# Patient Record
Sex: FEMALE | Race: WHITE | Employment: OTHER | URBAN - METROPOLITAN AREA
[De-identification: names, ages, dates, MRNs, and addresses within clinical notes are randomized per-mention and may not be internally consistent; named-entity substitution may affect disease eponyms.]

---

## 2020-06-23 PROBLEM — M17.11 OSTEOARTHRITIS OF RIGHT PATELLOFEMORAL JOINT: Status: ACTIVE | Noted: 2020-06-23

## 2020-06-23 ASSESSMENT — PROMIS GLOBAL HEALTH SCALE
IN GENERAL, PLEASE RATE HOW WELL YOU CARRY OUT YOUR USUAL SOCIAL ACTIVITIES (INCLUDES ACTIVITIES AT HOME, AT WORK, AND IN YOUR COMMUNITY, AND RESPONSIBILITIES AS A PARENT, CHILD, SPOUSE, EMPLOYEE, FRIEND, ETC) [ON A SCALE OF 1 (POOR) TO 5 (EXCELLENT)]?: 4
IN THE PAST 7 DAYS, HOW WOULD YOU RATE YOUR PAIN ON AVERAGE [ON A SCALE FROM 0 (NO PAIN) TO 10 (WORST IMAGINABLE PAIN)]?: 5
IN GENERAL, WOULD YOU SAY YOUR HEALTH IS...[ON A SCALE OF 1 (POOR) TO 5 (EXCELLENT)]: 4
IN GENERAL, HOW WOULD YOU RATE YOUR MENTAL HEALTH, INCLUDING YOUR MOOD AND YOUR ABILITY TO THINK [ON A SCALE OF 1 (POOR) TO 5 (EXCELLENT)]?: 4
IN THE PAST 7 DAYS, HOW WOULD YOU RATE YOUR FATIGUE ON AVERAGE [ON A SCALE FROM 1 (NONE) TO 5 (VERY SEVERE)]?: 3
IN GENERAL, WOULD YOU SAY YOUR QUALITY OF LIFE IS...[ON A SCALE OF 1 (POOR) TO 5 (EXCELLENT)]: 4
HOW IS THE PROMIS V1.1 BEING ADMINISTERED?: 2
IN GENERAL, HOW WOULD YOU RATE YOUR SATISFACTION WITH YOUR SOCIAL ACTIVITIES AND RELATIONSHIPS [ON A SCALE OF 1 (POOR) TO 5 (EXCELLENT)]?: 4
TO WHAT EXTENT ARE YOU ABLE TO CARRY OUT YOUR EVERYDAY PHYSICAL ACTIVITIES SUCH AS WALKING, CLIMBING STAIRS, CARRYING GROCERIES, OR MOVING A CHAIR [ON A SCALE OF 1 (NOT AT ALL) TO 5 (COMPLETELY)]?: 4
IN THE PAST 7 DAYS, HOW OFTEN HAVE YOU BEEN BOTHERED BY EMOTIONAL PROBLEMS, SUCH AS FEELING ANXIOUS, DEPRESSED, OR IRRITABLE [ON A SCALE FROM 1 (NEVER) TO 5 (ALWAYS)]?: 2
WHO IS THE PERSON COMPLETING THE PROMIS V1.1 SURVEY?: 0
IN GENERAL, HOW WOULD YOU RATE YOUR PHYSICAL HEALTH [ON A SCALE OF 1 (POOR) TO 5 (EXCELLENT)]?: 4

## 2020-06-23 ASSESSMENT — KOOS JR
TWISING OR PIVOTING ON KNEE: 2
GOING UP OR DOWN STAIRS: 3
STANDING UPRIGHT: 3
STRAIGHTENING KNEE FULLY: 2
BENDING TO THE FLOOR TO PICK UP OBJECT: 3
HOW SEVERE IS YOUR KNEE STIFFNESS AFTER FIRST WAKING IN MORNING: 2
RISING FROM SITTING: 3

## 2020-06-25 NOTE — H&P
[id number] [facility]                  [address]                       PREOPERATIVE HISTORY AND PHYSICAL    PATIENT NAME: Mike Nicolas                       :        1940  MED REC NO:   [id number]                            ROOM:  ACCOUNT NO:   [de-identified]                           ADMIT DATE: 2020  PROVIDER:     ELLIOT Willoughby    DATE OF SURGERY:  2020    CHIEF COMPLAINT:  Right knee pain. HISTORY OF PRESENT ILLNESS:  This is a 61-year-old female with chronic  right knee pain secondary to failed right total knee arthroplasty. She  is status post right total knee from 2013 from Dr. Melvin Whitaker in [place]. She has an unresurfaced patella and significant  osteoarthritis within the patellofemoral joint. She also has painful  and stiff total knee replacement with a question of oversized femoral  component. Workup for infection has been negative. She is now  scheduled for full revision right total knee arthroplasty. PAST MEDICAL HISTORY:  Hypertension. PRIOR SURGERIES:  Include bilateral knee arthroplasties. CURRENT MEDICATIONS:  Naproxen, latanoprost, omeprazole, fluticasone,  lovastatin, and paroxetine. ALLERGIES:  None. FAMILY HISTORY:  Heart disease. SOCIAL HISTORY:  Admits to social alcohol consumption. Denies tobacco  use. PCP is Dr. Patria Londono. REVIEW OF SYSTEMS:  ALLERGIES:  As stated above. SKIN AND LYMPHATIC:  Denies rash or lesions. CNS:  No prior CVAs. RESPIRATORY:  No cough or shortness of breath. CIRCULATORY:  History of hypertension. DIGESTIVE:  No dyspepsia. GENITOURINARY:  No dysuria. METABOLIC AND ENDOCRINE:  No thyroid disease or diabetes. HEMATOLOGIC:  No anemia. MUSCULOSKELETAL:  Positive OA. PSYCHIATRIC:  Negative. PHYSICAL EXAMINATION:  GENERAL APPEARANCE:  A well-nourished, well-developed 61-year-old  female, no acute distress. Alert and oriented x3.   SKIN: Without masses, rashes, or lesions. LYMPH NODES:  No adenopathy. HEAD:  Normocephalic, atraumatic. EARS:  Clear and functional.  EYES AND FUNDI:  PERRLA. NOSE:  Clear without deviation. MOUTH, TEETH, AND THROAT:  Clear and functional.  NECK:  Supple. No JVD. CHEST:  Normal excursion. BREASTS:  Deferred. LUNGS:  Clear to auscultation and percussion. HEART:  Regular rate and rhythm. No murmurs, gallops, or rubs  appreciated. ABDOMEN:  Rounded, soft, nontender. Hernia negative. BACK:  Nontender. EXTREMITIES:  Without clubbing, cyanosis, or edema. Exam of right knee:  Well-healed surgical incision. 5 to 90 degrees range of motion with  positive patellofemoral crepitus and tenderness. No laxity. No  effusion. 2/2 pulses. Neurovascular status distally is intact. NEUROLOGIC:  Cranial nerves II through XII grossly intact. GENITAL:  Deferred. RECTAL:  Deferred. DIAGNOSTIC IMPRESSION:  Failed right total knee arthroplasty with  osteoarthritis of the patellofemoral joint. PLAN:  Full revision right total knee arthroplasty.         ELLIOT Tavarez    D: 06/25/2020 8:08:17       T: 06/25/2020 8:54:45     [id number]  Job#: [id number]     Doc#: [id number]    CC:

## 2020-07-02 ENCOUNTER — ANESTHESIA EVENT (OUTPATIENT)
Dept: OPERATING ROOM | Age: 80
DRG: 466 | End: 2020-07-02
Payer: MEDICARE

## 2020-07-02 ENCOUNTER — HOSPITAL ENCOUNTER (OUTPATIENT)
Age: 80
Discharge: HOME | End: 2020-07-04
Payer: MEDICARE

## 2020-07-02 ENCOUNTER — HOSPITAL ENCOUNTER (OUTPATIENT)
Dept: PREADMISSION TESTING | Age: 80
Discharge: HOME | End: 2020-07-02
Payer: MEDICARE

## 2020-07-02 VITALS
RESPIRATION RATE: 16 BRPM | BODY MASS INDEX: 32.78 KG/M2 | TEMPERATURE: 97.3 F | OXYGEN SATURATION: 95 % | DIASTOLIC BLOOD PRESSURE: 82 MMHG | SYSTOLIC BLOOD PRESSURE: 142 MMHG | HEART RATE: 82 BPM | HEIGHT: 64 IN | WEIGHT: 192 LBS

## 2020-07-02 LAB
ANION GAP SERPL CALCULATED.3IONS-SCNC: 5 MMOL/L (ref 7–16)
BUN BLDV-MCNC: 13 MG/DL (ref 8–23)
CALCIUM SERPL-MCNC: 9.5 MG/DL (ref 8.6–10.2)
CHLORIDE BLD-SCNC: 106 MMOL/L (ref 98–107)
CO2: 29 MMOL/L (ref 22–29)
CREAT SERPL-MCNC: 1 MG/DL (ref 0.5–1)
GFR AFRICAN AMERICAN: >60
GFR NON-AFRICAN AMERICAN: 54 ML/MIN/1.73
GLUCOSE BLD-MCNC: 98 MG/DL (ref 74–99)
HCT VFR BLD CALC: 41.5 % (ref 34–48)
HEMOGLOBIN: 12.8 G/DL (ref 11.5–15.5)
MCH RBC QN AUTO: 28.2 PG (ref 26–35)
MCHC RBC AUTO-ENTMCNC: 30.8 % (ref 32–34.5)
MCV RBC AUTO: 91.4 FL (ref 80–99.9)
PDW BLD-RTO: 14.1 FL (ref 11.5–15)
PLATELET # BLD: 258 E9/L (ref 130–450)
PMV BLD AUTO: 9.9 FL (ref 7–12)
POTASSIUM SERPL-SCNC: 4.2 MMOL/L (ref 3.5–5)
PREALBUMIN: 22 MG/DL (ref 20–40)
RBC # BLD: 4.54 E12/L (ref 3.5–5.5)
SODIUM BLD-SCNC: 140 MMOL/L (ref 132–146)
WBC # BLD: 5.1 E9/L (ref 4.5–11.5)

## 2020-07-02 PROCEDURE — 36415 COLL VENOUS BLD VENIPUNCTURE: CPT

## 2020-07-02 PROCEDURE — 84134 ASSAY OF PREALBUMIN: CPT

## 2020-07-02 PROCEDURE — 87081 CULTURE SCREEN ONLY: CPT

## 2020-07-02 PROCEDURE — U0003 INFECTIOUS AGENT DETECTION BY NUCLEIC ACID (DNA OR RNA); SEVERE ACUTE RESPIRATORY SYNDROME CORONAVIRUS 2 (SARS-COV-2) (CORONAVIRUS DISEASE [COVID-19]), AMPLIFIED PROBE TECHNIQUE, MAKING USE OF HIGH THROUGHPUT TECHNOLOGIES AS DESCRIBED BY CMS-2020-01-R: HCPCS

## 2020-07-02 PROCEDURE — 80048 BASIC METABOLIC PNL TOTAL CA: CPT

## 2020-07-02 PROCEDURE — 85027 COMPLETE CBC AUTOMATED: CPT

## 2020-07-02 RX ORDER — ROPIVACAINE HYDROCHLORIDE 5 MG/ML
30 INJECTION, SOLUTION EPIDURAL; INFILTRATION; PERINEURAL
Status: CANCELLED | OUTPATIENT
Start: 2020-07-02 | End: 2020-07-02

## 2020-07-02 RX ORDER — ACETAMINOPHEN 500 MG
1000 TABLET ORAL ONCE
Status: CANCELLED | OUTPATIENT
Start: 2020-07-02 | End: 2020-07-02

## 2020-07-02 RX ORDER — NAPROXEN 500 MG/1
500 TABLET ORAL EVERY 12 HOURS PRN
COMMUNITY
End: 2020-09-23

## 2020-07-02 RX ORDER — FLUTICASONE PROPIONATE 50 MCG
2 SPRAY, SUSPENSION (ML) NASAL DAILY
COMMUNITY
Start: 2019-02-23

## 2020-07-02 RX ORDER — OXYCODONE HYDROCHLORIDE 5 MG/1
5 TABLET ORAL ONCE
Status: CANCELLED | OUTPATIENT
Start: 2020-07-02 | End: 2020-07-02

## 2020-07-02 RX ORDER — CELECOXIB 100 MG/1
200 CAPSULE ORAL ONCE
Status: CANCELLED | OUTPATIENT
Start: 2020-07-02 | End: 2020-07-02

## 2020-07-02 RX ORDER — GABAPENTIN 300 MG/1
300 CAPSULE ORAL PRN
COMMUNITY
Start: 2019-05-21

## 2020-07-02 RX ORDER — LATANOPROST 50 UG/ML
1 SOLUTION/ DROPS OPHTHALMIC NIGHTLY
COMMUNITY
Start: 2017-05-15

## 2020-07-02 RX ORDER — FENTANYL CITRATE 50 UG/ML
100 INJECTION, SOLUTION INTRAMUSCULAR; INTRAVENOUS ONCE
Status: CANCELLED | OUTPATIENT
Start: 2020-07-02 | End: 2020-07-02

## 2020-07-02 RX ORDER — MIDAZOLAM HYDROCHLORIDE 1 MG/ML
1 INJECTION INTRAMUSCULAR; INTRAVENOUS EVERY 5 MIN PRN
Status: CANCELLED | OUTPATIENT
Start: 2020-07-02

## 2020-07-02 RX ORDER — GABAPENTIN 100 MG/1
200 CAPSULE ORAL ONCE
Status: CANCELLED | OUTPATIENT
Start: 2020-07-02 | End: 2020-07-02

## 2020-07-02 ASSESSMENT — KOOS JR
RISING FROM SITTING: 2
STANDING UPRIGHT: 2
STRAIGHTENING KNEE FULLY: 2
HOW SEVERE IS YOUR KNEE STIFFNESS AFTER FIRST WAKING IN MORNING: 2
GOING UP OR DOWN STAIRS: 2
BENDING TO THE FLOOR TO PICK UP OBJECT: 2
TWISING OR PIVOTING ON KNEE: 3

## 2020-07-02 ASSESSMENT — PAIN DESCRIPTION - ONSET: ONSET: SUDDEN

## 2020-07-02 ASSESSMENT — PAIN SCALES - GENERAL: PAINLEVEL_OUTOF10: 0

## 2020-07-02 ASSESSMENT — PAIN DESCRIPTION - PROGRESSION: CLINICAL_PROGRESSION: GRADUALLY WORSENING

## 2020-07-02 ASSESSMENT — PAIN DESCRIPTION - FREQUENCY: FREQUENCY: INTERMITTENT

## 2020-07-02 ASSESSMENT — PAIN DESCRIPTION - PAIN TYPE: TYPE: CHRONIC PAIN

## 2020-07-02 ASSESSMENT — PAIN DESCRIPTION - ORIENTATION: ORIENTATION: RIGHT

## 2020-07-02 ASSESSMENT — PAIN DESCRIPTION - LOCATION: LOCATION: KNEE

## 2020-07-02 ASSESSMENT — PAIN DESCRIPTION - DESCRIPTORS: DESCRIPTORS: ACHING

## 2020-07-02 ASSESSMENT — PAIN - FUNCTIONAL ASSESSMENT: PAIN_FUNCTIONAL_ASSESSMENT: PREVENTS OR INTERFERES SOME ACTIVE ACTIVITIES AND ADLS

## 2020-07-02 NOTE — ANESTHESIA PRE PROCEDURE
Department of Anesthesiology  Preprocedure Note       Name:  Rubio Haile   Age:  70 y.o.  :  1940                                          MRN:  [id number]         Date:  2020      Surgeon: Romelia Friday):  Quintin Song MD    Procedure: Procedure(s):  RIGHT KNEE TOTAL ARTHROPLASTY REVISION  ++LUCY++   ++PNB++    Medications prior to admission:   Prior to Admission medications    Medication Sig Start Date End Date Taking? Authorizing Provider   naproxen (NAPROSYN) 500 MG tablet Take 500 mg by mouth every 12 hours as needed Last taken 2020   Yes Historical Provider, MD   latanoprost (XALATAN) 0.005 % ophthalmic solution Place 1 drop into both eyes nightly  5/15/17  Yes Historical Provider, MD   fluticasone (FLONASE) 50 MCG/ACT nasal spray 2 sprays by Nasal route daily  19  Yes Historical Provider, MD   gabapentin (NEURONTIN) 300 MG capsule Take 300 mg by mouth as needed. 19  Yes Historical Provider, MD   Calcium Carb-Cholecalciferol (CALCIUM + D3 PO) Take 1 tablet by mouth 2 times daily Last  2020   Yes Historical Provider, MD   lovastatin (MEVACOR) 40 MG tablet Take 40 mg by mouth nightly  17  Yes Historical Provider, MD   PARoxetine (PAXIL) 20 MG tablet Take 20 mg by mouth every morning  9/15/17  Yes Historical Provider, MD   omeprazole (PRILOSEC) 20 MG capsule Take 40 mg by mouth daily. Yes Historical Provider, MD       Current medications:    Current Outpatient Medications   Medication Sig Dispense Refill    naproxen (NAPROSYN) 500 MG tablet Take 500 mg by mouth every 12 hours as needed Last taken 2020      latanoprost (XALATAN) 0.005 % ophthalmic solution Place 1 drop into both eyes nightly       fluticasone (FLONASE) 50 MCG/ACT nasal spray 2 sprays by Nasal route daily       gabapentin (NEURONTIN) 300 MG capsule Take 300 mg by mouth as needed.        Calcium Carb-Cholecalciferol (CALCIUM + D3 PO) Take 1 tablet by mouth 2 times daily Last  2020     Louis Tafoya lovastatin (MEVACOR) 40 MG tablet Take 40 mg by mouth nightly   1    PARoxetine (PAXIL) 20 MG tablet Take 20 mg by mouth every morning   2    omeprazole (PRILOSEC) 20 MG capsule Take 40 mg by mouth daily. No current facility-administered medications for this encounter. Allergies: Allergies   Allergen Reactions    Adhesive Tape Rash    Augmentin [Amoxicillin-Pot Clavulanate] Rash    Penicillins Other (See Comments)     Pt father was allergic to PCN; pt states has had PCN without reactions       Problem List:    Patient Active Problem List   Diagnosis Code    Osteoarthritis of right patellofemoral joint M17.11       Past Medical History:        Diagnosis Date    Acid reflux disease     Anxiety     Arthritis     Central retinal vein occlusion of right eye     with vision deficets    Glaucoma, left eye     History of fracture of femur 2006    right; no surgery    History of trigeminal neuralgia     Hyperlipidemia     Hypoglycemia     Right knee pain 07/2020    for Lazarus Gitelman 07/08/2020 Dr Veronica Ng    Symptoms consistent with irritable bowel syndrome     Use of cane as ambulatory aid     pain    Wears glasses        Past Surgical History:        Procedure Laterality Date    BREAST BIOPSY Bilateral 2015 right , 2020 left    BREAST LUMPECTOMY      CATARACT REMOVAL WITH IMPLANT Right 2005    CHOLECYSTECTOMY  2015    JOINT REPLACEMENT      OTHER SURGICAL HISTORY      arthroscopic thumb bilateral    TONSILLECTOMY      TOTAL KNEE ARTHROPLASTY Bilateral 2013       Social History:    Social History     Tobacco Use    Smoking status: Never Smoker    Smokeless tobacco: Never Used   Substance Use Topics    Alcohol use:  Yes     Alcohol/week: 7.0 standard drinks     Types: 7 Glasses of wine per week                                Counseling given: Not Answered      Vital Signs (Current):   Vitals:    07/02/20 0825   BP: (!) 142/82   Pulse: 82   Resp: 16   Temp: 97.3 °F (36.3 °C)   TempSrc: Infrared   SpO2: 95%   Weight: 192 lb (87.1 kg)   Height: 5' 4\" (1.626 m)                                              BP Readings from Last 3 Encounters:   07/02/20 (!) 142/82   11/19/17 (!) 152/95       NPO Status:                                                                                 BMI:   Wt Readings from Last 3 Encounters:   07/02/20 192 lb (87.1 kg)   11/19/17 180 lb (81.6 kg)   10/27/17 186 lb (84.4 kg)     Body mass index is 32.96 kg/m². CBC:   Lab Results   Component Value Date    WBC 5.3 07/11/2015    RBC 4.72 07/11/2015    HGB 13.3 07/11/2015    HCT 40.0 07/11/2015    MCV 84.9 07/11/2015    RDW 13.3 07/11/2015     07/11/2015       CMP:   Lab Results   Component Value Date     07/11/2015    K 3.6 07/11/2015     07/11/2015    CO2 27 07/11/2015    BUN 11 07/11/2015    CREATININE 0.9 07/11/2015    GFRAA >60 07/11/2015    LABGLOM >60 07/11/2015    GLUCOSE 108 07/11/2015    PROT 7.4 03/25/2013    CALCIUM 9.6 07/11/2015    BILITOT 0.5 03/25/2013    ALKPHOS 81 03/25/2013    AST 38 03/25/2013    ALT 29 03/25/2013       POC Tests: No results for input(s): POCGLU, POCNA, POCK, POCCL, POCBUN, POCHEMO, POCHCT in the last 72 hours.     Coags: No results found for: PROTIME, INR, APTT    HCG (If Applicable): No results found for: PREGTESTUR, PREGSERUM, HCG, HCGQUANT     ABGs: No results found for: PHART, PO2ART, URH2BVN, JHK1MKA, BEART, Y5AUFIIB     Type & Screen (If Applicable):  No results found for: LABABO, LABRH    Drug/Infectious Status (If Applicable):  No results found for: HIV, HEPCAB    COVID-19 Screening (If Applicable): No results found for: COVID19      Anesthesia Evaluation  Patient summary reviewed no history of anesthetic complications:   Airway: Mallampati: III  TM distance: >3 FB   Neck ROM: full  Mouth opening: > = 3 FB Dental: normal exam         Pulmonary:Negative Pulmonary ROS breath sounds clear to auscultation                             Cardiovascular:    (+) hyperlipidemia        Rhythm: regular  Rate: normal                    Neuro/Psych:                ROS comment: History of trigeminal neuralgia GI/Hepatic/Renal:   (+) GERD:,          ROS comment: Irritable bowel syndrome. Endo/Other:    (+) : arthritis (Failed Right Total Knee): OA., .                 Abdominal:           Vascular: negative vascular ROS. Anesthesia Plan      spinal and regional     ASA 2           MIPS: Postoperative opioids intended and Prophylactic antiemetics administered. Anesthetic plan and risks discussed with patient. Plan discussed with CRNA and surgical team.    Attending anesthesiologist reviewed and agrees with Pre Eval content      Patient agrees to Right Adductor Canal Block and Spinal Anesthesia with Sedation        Eileen Mccollum MD   7/2/2020        DOS STAFF ADDENDUM:    Pt seen and examined, physical exam updated, chart reviewed including anesthesia, drug and allergy history. H&P reviewed. No interval changes to history or physical examination (unless noted above). NPO status confirmed. Anesthetic plan, risks, benefits, alternatives discussed with patient. Patient verbalized an understanding and agrees to proceed.      Ekta Mcfadden MD   Anesthesiologist

## 2020-07-02 NOTE — PROGRESS NOTES
Rd PRE-ADMISSION TESTING INSTRUCTIONS    Please come to the hospital wearing a mask and have your significant other wear a mask as well. Both of you should check your temperature before leaving to come here,  if it is 100 or higher please call [phone] for instruction. The Preadmission Testing patient is instructed accordingly using the following criteria (check applicable):    ARRIVAL INSTRUCTIONS:  [x] Parking the day of Surgery is located in the Main Entrance lot. Upon entering the door, make an immediate right to the surgery reception desk    [] Bring photo ID and insurance card    [x] Bring in a copy of Living will or Durable Power of  papers. [x] Please be sure to arrange for responsible adult to provide transportation to and from the hospital    [] Please arrange for responsible adult to be with you for the 24 hour period post procedure due to having anesthesia      GENERAL INSTRUCTIONS:    [x] Nothing by mouth after midnight, including gum, candy, mints or water    [x] You may brush your teeth, but do not swallow any water    [x] Take medications as instructed with 1-2 oz of water    [x] Stop herbal supplements and vitamins 5 days prior to procedure    [x] Follow preop dosing of blood thinners per physician instructions    [] Take 1/2 dose of evening insulin, but no insulin after midnight    [] No oral diabetic medications after midnight    [] If diabetic and have low blood sugar or feel symptomatic, take 1-2oz apple juice only    [] Bring inhalers day of surgery    [] Bring C-PAP/ Bi-Pap day of surgery    [] Bring urine specimen day of surgery    [] Shower or bath with soap, lather and rinse well, AM of Surgery, no lotion, powders or creams to surgical site    [] Follow bowel prep as instructed per surgeon    [x] No tobacco products within 24 hours of surgery     [x] No alcohol or illegal drug use within 24 hours of surgery.     [x] Jewelry, body piercing's, eyeglasses, contact lenses and dentures are not permitted into surgery (bring cases)      [x] Please do not wear any nail polish, make up or hair products on the day of surgery    [] If not already done, you can expect a call from registration    [x] You can expect a call the business day prior to procedure to notify you if your arrival time changes    [x] If you receive a survey after surgery we would greatly appreciate your comments    [] Parent/guardian of a minor must accompany their child and remain on the premises  the entire time they are under our care     [] Pediatric patients may bring favorite toy, blanket or comfort item with them    [] A caregiver or family member must remain with the patient during their stay if they are mentally handicapped, have dementia, disoriented or unable to use a call light or would be a safety concern if left unattended    [x] Please notify surgeon if you develop any illness between now and time of surgery (cold, cough, sore throat, fever, nausea, vomiting) or any signs of infections  including skin, wounds, and dental.    [x]  The Outpatient Pharmacy is available to fill your prescription here on your day of surgery, ask your preop nurse for details    [] Other instructions  EDUCATIONAL MATERIALS PROVIDED:    [x] PAT Preoperative Education Packet/Booklet     [x] Medication List    [] Fluoroscopy Information Pamphlet    [] Transfusion bracelet applied with instructions    [] Joint replacement video reviewed    [x] Shower with soap, lather and rinse well, and use CHG wipes provided the evening before surgery as instructed

## 2020-07-02 NOTE — PROGRESS NOTES
Have you been tested for COVID  Yes      07/02/2020 preop test       Have you been told you were positive for COVID No  Have you had any known exposure to someone that is positive for COVID No  Do you have a cough                   Yes      Slight , related to chronic  sinus drainage          Do you have shortness of breath No                 Do you have a sore throat            No                Are you having chills                    No                Are you having muscle aches. No                    Please come to the hospital wearing a mask and have your significant other wear a mask as well. Both of you should check your temperature before leaving to come here,  if it is 100 or higher please call [phone] for instruction.

## 2020-07-03 LAB
MRSA CULTURE ONLY: NORMAL
SARS-COV-2: NOT DETECTED
SOURCE: NORMAL

## 2020-07-08 ENCOUNTER — HOSPITAL ENCOUNTER (INPATIENT)
Age: 80
LOS: 2 days | Discharge: HOME HEALTH | DRG: 466 | End: 2020-07-10
Attending: ORTHOPAEDIC SURGERY | Admitting: ORTHOPAEDIC SURGERY
Payer: MEDICARE

## 2020-07-08 ENCOUNTER — ANESTHESIA (OUTPATIENT)
Dept: OPERATING ROOM | Age: 80
DRG: 466 | End: 2020-07-08
Payer: MEDICARE

## 2020-07-08 ENCOUNTER — APPOINTMENT (OUTPATIENT)
Dept: GENERAL RADIOLOGY | Age: 80
DRG: 466 | End: 2020-07-08
Attending: ORTHOPAEDIC SURGERY
Payer: MEDICARE

## 2020-07-08 VITALS — OXYGEN SATURATION: 91 % | SYSTOLIC BLOOD PRESSURE: 103 MMHG | DIASTOLIC BLOOD PRESSURE: 57 MMHG | TEMPERATURE: 97.7 F

## 2020-07-08 PROBLEM — T84.012A FAILED TOTAL RIGHT KNEE REPLACEMENT (HCC): Status: ACTIVE | Noted: 2020-07-08

## 2020-07-08 PROCEDURE — 6360000002 HC RX W HCPCS: Performed by: ANESTHESIOLOGY

## 2020-07-08 PROCEDURE — 88300 SURGICAL PATH GROSS: CPT

## 2020-07-08 PROCEDURE — 3700000001 HC ADD 15 MINUTES (ANESTHESIA): Performed by: ORTHOPAEDIC SURGERY

## 2020-07-08 PROCEDURE — 2720000010 HC SURG SUPPLY STERILE: Performed by: ORTHOPAEDIC SURGERY

## 2020-07-08 PROCEDURE — 2500000003 HC RX 250 WO HCPCS: Performed by: NURSE ANESTHETIST, CERTIFIED REGISTERED

## 2020-07-08 PROCEDURE — 97530 THERAPEUTIC ACTIVITIES: CPT

## 2020-07-08 PROCEDURE — 97165 OT EVAL LOW COMPLEX 30 MIN: CPT

## 2020-07-08 PROCEDURE — 2709999900 HC NON-CHARGEABLE SUPPLY: Performed by: ORTHOPAEDIC SURGERY

## 2020-07-08 PROCEDURE — 3700000000 HC ANESTHESIA ATTENDED CARE: Performed by: ORTHOPAEDIC SURGERY

## 2020-07-08 PROCEDURE — 2580000003 HC RX 258: Performed by: ORTHOPAEDIC SURGERY

## 2020-07-08 PROCEDURE — 88305 TISSUE EXAM BY PATHOLOGIST: CPT

## 2020-07-08 PROCEDURE — 1200000000 HC SEMI PRIVATE

## 2020-07-08 PROCEDURE — 88311 DECALCIFY TISSUE: CPT

## 2020-07-08 PROCEDURE — 0SPC0JZ REMOVAL OF SYNTHETIC SUBSTITUTE FROM RIGHT KNEE JOINT, OPEN APPROACH: ICD-10-PCS | Performed by: ORTHOPAEDIC SURGERY

## 2020-07-08 PROCEDURE — 7100000001 HC PACU RECOVERY - ADDTL 15 MIN: Performed by: ORTHOPAEDIC SURGERY

## 2020-07-08 PROCEDURE — 2500000003 HC RX 250 WO HCPCS: Performed by: PHYSICIAN ASSISTANT

## 2020-07-08 PROCEDURE — 3E0T3BZ INTRODUCTION OF ANESTHETIC AGENT INTO PERIPHERAL NERVES AND PLEXI, PERCUTANEOUS APPROACH: ICD-10-PCS | Performed by: ANESTHESIOLOGY

## 2020-07-08 PROCEDURE — C1776 JOINT DEVICE (IMPLANTABLE): HCPCS | Performed by: ORTHOPAEDIC SURGERY

## 2020-07-08 PROCEDURE — 6370000000 HC RX 637 (ALT 250 FOR IP): Performed by: PHYSICIAN ASSISTANT

## 2020-07-08 PROCEDURE — 73560 X-RAY EXAM OF KNEE 1 OR 2: CPT

## 2020-07-08 PROCEDURE — 6360000002 HC RX W HCPCS: Performed by: NURSE ANESTHETIST, CERTIFIED REGISTERED

## 2020-07-08 PROCEDURE — 2580000003 HC RX 258: Performed by: PHYSICIAN ASSISTANT

## 2020-07-08 PROCEDURE — 3600000015 HC SURGERY LEVEL 5 ADDTL 15MIN: Performed by: ORTHOPAEDIC SURGERY

## 2020-07-08 PROCEDURE — 6360000002 HC RX W HCPCS: Performed by: PHYSICIAN ASSISTANT

## 2020-07-08 PROCEDURE — 97161 PT EVAL LOW COMPLEX 20 MIN: CPT

## 2020-07-08 PROCEDURE — 2700000000 HC OXYGEN THERAPY PER DAY

## 2020-07-08 PROCEDURE — 6360000002 HC RX W HCPCS: Performed by: ORTHOPAEDIC SURGERY

## 2020-07-08 PROCEDURE — 0SRC0J9 REPLACEMENT OF RIGHT KNEE JOINT WITH SYNTHETIC SUBSTITUTE, CEMENTED, OPEN APPROACH: ICD-10-PCS | Performed by: ORTHOPAEDIC SURGERY

## 2020-07-08 PROCEDURE — 7100000000 HC PACU RECOVERY - FIRST 15 MIN: Performed by: ORTHOPAEDIC SURGERY

## 2020-07-08 PROCEDURE — 3600000005 HC SURGERY LEVEL 5 BASE: Performed by: ORTHOPAEDIC SURGERY

## 2020-07-08 PROCEDURE — 2500000003 HC RX 250 WO HCPCS: Performed by: ORTHOPAEDIC SURGERY

## 2020-07-08 PROCEDURE — 2580000003 HC RX 258: Performed by: NURSE ANESTHETIST, CERTIFIED REGISTERED

## 2020-07-08 PROCEDURE — 64447 NJX AA&/STRD FEMORAL NRV IMG: CPT | Performed by: ANESTHESIOLOGY

## 2020-07-08 PROCEDURE — 6370000000 HC RX 637 (ALT 250 FOR IP): Performed by: INTERNAL MEDICINE

## 2020-07-08 PROCEDURE — 99221 1ST HOSP IP/OBS SF/LOW 40: CPT | Performed by: INTERNAL MEDICINE

## 2020-07-08 PROCEDURE — 6370000000 HC RX 637 (ALT 250 FOR IP): Performed by: ORTHOPAEDIC SURGERY

## 2020-07-08 PROCEDURE — C1713 ANCHOR/SCREW BN/BN,TIS/BN: HCPCS | Performed by: ORTHOPAEDIC SURGERY

## 2020-07-08 DEVICE — BASEPLATE TIB SZ 4 UNIV KNEE TRITANIUM TOT STBL CEM: Type: IMPLANTABLE DEVICE | Site: KNEE | Status: FUNCTIONAL

## 2020-07-08 DEVICE — IMPLANTABLE DEVICE: Type: IMPLANTABLE DEVICE | Site: KNEE | Status: FUNCTIONAL

## 2020-07-08 DEVICE — IMPLANT PAT DIA32MM THK10MM X3 ASYM TRIATHLON: Type: IMPLANTABLE DEVICE | Site: KNEE | Status: FUNCTIONAL

## 2020-07-08 DEVICE — ADAPTER FEM DIA2MM KNEE TOT STBL OFFSET REV TRIATHLON: Type: IMPLANTABLE DEVICE | Site: KNEE | Status: FUNCTIONAL

## 2020-07-08 DEVICE — COMPONENT FEM SZ 4 R KNEE TOT STBL TRIATHLON: Type: IMPLANTABLE DEVICE | Site: KNEE | Status: FUNCTIONAL

## 2020-07-08 DEVICE — AUGMENT FEM SZ 4 THK5MM POST KNEE CO CHROM TOT STBL FULL: Type: IMPLANTABLE DEVICE | Site: KNEE | Status: FUNCTIONAL

## 2020-07-08 DEVICE — STEM FEM L100MM DIA17MM KNEE TOT STBL END CEMENTLESS: Type: IMPLANTABLE DEVICE | Site: KNEE | Status: FUNCTIONAL

## 2020-07-08 DEVICE — CEMENT BNE 40 GM RADIOPAQUE BA SIMPLEX P: Type: IMPLANTABLE DEVICE | Site: KNEE | Status: FUNCTIONAL

## 2020-07-08 DEVICE — ADAPTER FEM DIA4MM KNEE TOT STBL OFFSET TRIATHLON: Type: IMPLANTABLE DEVICE | Site: KNEE | Status: FUNCTIONAL

## 2020-07-08 RX ORDER — MIDAZOLAM HYDROCHLORIDE 1 MG/ML
1 INJECTION INTRAMUSCULAR; INTRAVENOUS EVERY 5 MIN PRN
Status: DISCONTINUED | OUTPATIENT
Start: 2020-07-08 | End: 2020-07-08 | Stop reason: HOSPADM

## 2020-07-08 RX ORDER — BUPIVACAINE HYDROCHLORIDE 7.5 MG/ML
INJECTION, SOLUTION INTRASPINAL PRN
Status: DISCONTINUED | OUTPATIENT
Start: 2020-07-08 | End: 2020-07-08 | Stop reason: SDUPTHER

## 2020-07-08 RX ORDER — PROPOFOL 10 MG/ML
INJECTION, EMULSION INTRAVENOUS CONTINUOUS PRN
Status: DISCONTINUED | OUTPATIENT
Start: 2020-07-08 | End: 2020-07-08 | Stop reason: SDUPTHER

## 2020-07-08 RX ORDER — PROMETHAZINE HYDROCHLORIDE 25 MG/1
12.5 TABLET ORAL EVERY 6 HOURS PRN
Status: DISCONTINUED | OUTPATIENT
Start: 2020-07-08 | End: 2020-07-10 | Stop reason: HOSPADM

## 2020-07-08 RX ORDER — SODIUM CHLORIDE 0.9 % (FLUSH) 0.9 %
10 SYRINGE (ML) INJECTION EVERY 12 HOURS SCHEDULED
Status: DISCONTINUED | OUTPATIENT
Start: 2020-07-08 | End: 2020-07-10 | Stop reason: HOSPADM

## 2020-07-08 RX ORDER — PAROXETINE HYDROCHLORIDE 20 MG/1
20 TABLET, FILM COATED ORAL EVERY MORNING
Status: DISCONTINUED | OUTPATIENT
Start: 2020-07-09 | End: 2020-07-10 | Stop reason: HOSPADM

## 2020-07-08 RX ORDER — PROMETHAZINE HYDROCHLORIDE 25 MG/ML
6.25 INJECTION, SOLUTION INTRAMUSCULAR; INTRAVENOUS
Status: DISCONTINUED | OUTPATIENT
Start: 2020-07-08 | End: 2020-07-08 | Stop reason: HOSPADM

## 2020-07-08 RX ORDER — GABAPENTIN 300 MG/1
300 CAPSULE ORAL NIGHTLY
Status: DISCONTINUED | OUTPATIENT
Start: 2020-07-08 | End: 2020-07-10 | Stop reason: HOSPADM

## 2020-07-08 RX ORDER — OXYCODONE HYDROCHLORIDE 5 MG/1
5 TABLET ORAL EVERY 4 HOURS PRN
Status: DISCONTINUED | OUTPATIENT
Start: 2020-07-08 | End: 2020-07-10 | Stop reason: HOSPADM

## 2020-07-08 RX ORDER — OXYCODONE HYDROCHLORIDE 5 MG/1
5 TABLET ORAL ONCE
Status: DISCONTINUED | OUTPATIENT
Start: 2020-07-08 | End: 2020-07-08

## 2020-07-08 RX ORDER — SENNA AND DOCUSATE SODIUM 50; 8.6 MG/1; MG/1
1 TABLET, FILM COATED ORAL 2 TIMES DAILY
Status: DISCONTINUED | OUTPATIENT
Start: 2020-07-08 | End: 2020-07-10 | Stop reason: HOSPADM

## 2020-07-08 RX ORDER — SODIUM CHLORIDE 0.9 % (FLUSH) 0.9 %
10 SYRINGE (ML) INJECTION PRN
Status: DISCONTINUED | OUTPATIENT
Start: 2020-07-08 | End: 2020-07-10 | Stop reason: HOSPADM

## 2020-07-08 RX ORDER — ONDANSETRON 2 MG/ML
4 INJECTION INTRAMUSCULAR; INTRAVENOUS EVERY 6 HOURS PRN
Status: DISCONTINUED | OUTPATIENT
Start: 2020-07-08 | End: 2020-07-10 | Stop reason: HOSPADM

## 2020-07-08 RX ORDER — GABAPENTIN 100 MG/1
200 CAPSULE ORAL ONCE
Status: DISCONTINUED | OUTPATIENT
Start: 2020-07-08 | End: 2020-07-08

## 2020-07-08 RX ORDER — CELECOXIB 100 MG/1
200 CAPSULE ORAL ONCE
Status: DISCONTINUED | OUTPATIENT
Start: 2020-07-08 | End: 2020-07-08

## 2020-07-08 RX ORDER — SODIUM CHLORIDE 9 MG/ML
INJECTION, SOLUTION INTRAVENOUS CONTINUOUS
Status: DISCONTINUED | OUTPATIENT
Start: 2020-07-08 | End: 2020-07-10 | Stop reason: HOSPADM

## 2020-07-08 RX ORDER — ACETAMINOPHEN 500 MG
1000 TABLET ORAL ONCE
Status: COMPLETED | OUTPATIENT
Start: 2020-07-08 | End: 2020-07-08

## 2020-07-08 RX ORDER — DEXAMETHASONE SODIUM PHOSPHATE 4 MG/ML
INJECTION, SOLUTION INTRA-ARTICULAR; INTRALESIONAL; INTRAMUSCULAR; INTRAVENOUS; SOFT TISSUE PRN
Status: DISCONTINUED | OUTPATIENT
Start: 2020-07-08 | End: 2020-07-08 | Stop reason: SDUPTHER

## 2020-07-08 RX ORDER — DEXAMETHASONE SODIUM PHOSPHATE 10 MG/ML
8 INJECTION, SOLUTION INTRAMUSCULAR; INTRAVENOUS ONCE
Status: DISCONTINUED | OUTPATIENT
Start: 2020-07-08 | End: 2020-07-08 | Stop reason: HOSPADM

## 2020-07-08 RX ORDER — ATORVASTATIN CALCIUM 20 MG/1
20 TABLET, FILM COATED ORAL DAILY
Status: DISCONTINUED | OUTPATIENT
Start: 2020-07-08 | End: 2020-07-10 | Stop reason: HOSPADM

## 2020-07-08 RX ORDER — ACETAMINOPHEN 500 MG
1000 TABLET ORAL EVERY 8 HOURS SCHEDULED
Status: DISCONTINUED | OUTPATIENT
Start: 2020-07-08 | End: 2020-07-10 | Stop reason: HOSPADM

## 2020-07-08 RX ORDER — ACETAMINOPHEN 500 MG
1000 TABLET ORAL ONCE
Status: DISCONTINUED | OUTPATIENT
Start: 2020-07-08 | End: 2020-07-08

## 2020-07-08 RX ORDER — FLUTICASONE PROPIONATE 50 MCG
2 SPRAY, SUSPENSION (ML) NASAL DAILY
Status: DISCONTINUED | OUTPATIENT
Start: 2020-07-08 | End: 2020-07-10 | Stop reason: HOSPADM

## 2020-07-08 RX ORDER — ONDANSETRON 2 MG/ML
INJECTION INTRAMUSCULAR; INTRAVENOUS PRN
Status: DISCONTINUED | OUTPATIENT
Start: 2020-07-08 | End: 2020-07-08 | Stop reason: SDUPTHER

## 2020-07-08 RX ORDER — OXYCODONE HYDROCHLORIDE 5 MG/1
10 TABLET ORAL EVERY 4 HOURS PRN
Status: DISCONTINUED | OUTPATIENT
Start: 2020-07-08 | End: 2020-07-10 | Stop reason: HOSPADM

## 2020-07-08 RX ORDER — FENTANYL CITRATE 50 UG/ML
100 INJECTION, SOLUTION INTRAMUSCULAR; INTRAVENOUS ONCE
Status: COMPLETED | OUTPATIENT
Start: 2020-07-08 | End: 2020-07-08

## 2020-07-08 RX ORDER — GABAPENTIN 300 MG/1
300 CAPSULE ORAL ONCE
Status: COMPLETED | OUTPATIENT
Start: 2020-07-08 | End: 2020-07-08

## 2020-07-08 RX ORDER — SODIUM CHLORIDE, SODIUM LACTATE, POTASSIUM CHLORIDE, CALCIUM CHLORIDE 600; 310; 30; 20 MG/100ML; MG/100ML; MG/100ML; MG/100ML
INJECTION, SOLUTION INTRAVENOUS CONTINUOUS PRN
Status: DISCONTINUED | OUTPATIENT
Start: 2020-07-08 | End: 2020-07-08 | Stop reason: SDUPTHER

## 2020-07-08 RX ORDER — SODIUM CHLORIDE 0.9 % (FLUSH) 0.9 %
10 SYRINGE (ML) INJECTION EVERY 12 HOURS SCHEDULED
Status: DISCONTINUED | OUTPATIENT
Start: 2020-07-08 | End: 2020-07-08 | Stop reason: HOSPADM

## 2020-07-08 RX ORDER — ROPIVACAINE HYDROCHLORIDE 5 MG/ML
30 INJECTION, SOLUTION EPIDURAL; INFILTRATION; PERINEURAL
Status: COMPLETED | OUTPATIENT
Start: 2020-07-08 | End: 2020-07-08

## 2020-07-08 RX ORDER — PROPOFOL 10 MG/ML
INJECTION, EMULSION INTRAVENOUS PRN
Status: DISCONTINUED | OUTPATIENT
Start: 2020-07-08 | End: 2020-07-08 | Stop reason: SDUPTHER

## 2020-07-08 RX ORDER — PANTOPRAZOLE SODIUM 40 MG/1
40 TABLET, DELAYED RELEASE ORAL
Status: DISCONTINUED | OUTPATIENT
Start: 2020-07-09 | End: 2020-07-10 | Stop reason: HOSPADM

## 2020-07-08 RX ORDER — LIDOCAINE HYDROCHLORIDE 20 MG/ML
INJECTION, SOLUTION EPIDURAL; INFILTRATION; INTRACAUDAL; PERINEURAL PRN
Status: DISCONTINUED | OUTPATIENT
Start: 2020-07-08 | End: 2020-07-08 | Stop reason: SDUPTHER

## 2020-07-08 RX ORDER — SODIUM CHLORIDE 0.9 % (FLUSH) 0.9 %
10 SYRINGE (ML) INJECTION PRN
Status: DISCONTINUED | OUTPATIENT
Start: 2020-07-08 | End: 2020-07-08 | Stop reason: HOSPADM

## 2020-07-08 RX ORDER — CELECOXIB 100 MG/1
100 CAPSULE ORAL ONCE
Status: COMPLETED | OUTPATIENT
Start: 2020-07-08 | End: 2020-07-08

## 2020-07-08 RX ORDER — LATANOPROST 50 UG/ML
1 SOLUTION/ DROPS OPHTHALMIC NIGHTLY
Status: DISCONTINUED | OUTPATIENT
Start: 2020-07-08 | End: 2020-07-10 | Stop reason: HOSPADM

## 2020-07-08 RX ORDER — DEXAMETHASONE SODIUM PHOSPHATE 10 MG/ML
10 INJECTION INTRAMUSCULAR; INTRAVENOUS ONCE
Status: COMPLETED | OUTPATIENT
Start: 2020-07-09 | End: 2020-07-09

## 2020-07-08 RX ORDER — ROPIVACAINE HYDROCHLORIDE 5 MG/ML
INJECTION, SOLUTION EPIDURAL; INFILTRATION; PERINEURAL
Status: COMPLETED | OUTPATIENT
Start: 2020-07-08 | End: 2020-07-08

## 2020-07-08 RX ORDER — FENTANYL CITRATE 50 UG/ML
INJECTION, SOLUTION INTRAMUSCULAR; INTRAVENOUS PRN
Status: DISCONTINUED | OUTPATIENT
Start: 2020-07-08 | End: 2020-07-08 | Stop reason: SDUPTHER

## 2020-07-08 RX ORDER — SODIUM CHLORIDE 9 MG/ML
INJECTION, SOLUTION INTRAVENOUS CONTINUOUS
Status: DISCONTINUED | OUTPATIENT
Start: 2020-07-08 | End: 2020-07-08

## 2020-07-08 RX ADMIN — SODIUM CHLORIDE, POTASSIUM CHLORIDE, SODIUM LACTATE AND CALCIUM CHLORIDE: 600; 310; 30; 20 INJECTION, SOLUTION INTRAVENOUS at 08:07

## 2020-07-08 RX ADMIN — Medication 2 G: at 23:51

## 2020-07-08 RX ADMIN — Medication 2 G: at 08:20

## 2020-07-08 RX ADMIN — MIDAZOLAM HYDROCHLORIDE 1 MG: 1 INJECTION, SOLUTION INTRAMUSCULAR; INTRAVENOUS at 07:51

## 2020-07-08 RX ADMIN — GABAPENTIN 300 MG: 300 CAPSULE ORAL at 21:41

## 2020-07-08 RX ADMIN — TRANEXAMIC ACID 1000 MG: 1 INJECTION, SOLUTION INTRAVENOUS at 12:29

## 2020-07-08 RX ADMIN — OXYCODONE HYDROCHLORIDE 5 MG: 5 TABLET ORAL at 14:52

## 2020-07-08 RX ADMIN — ROPIVACAINE HYDROCHLORIDE 30 ML: 5 INJECTION, SOLUTION EPIDURAL; INFILTRATION; PERINEURAL at 07:54

## 2020-07-08 RX ADMIN — DOCUSATE SODIUM 50 MG AND SENNOSIDES 8.6 MG 1 TABLET: 8.6; 5 TABLET, FILM COATED ORAL at 21:41

## 2020-07-08 RX ADMIN — LATANOPROST 1 DROP: 50 SOLUTION OPHTHALMIC at 23:51

## 2020-07-08 RX ADMIN — DOCUSATE SODIUM 50 MG AND SENNOSIDES 8.6 MG 1 TABLET: 8.6; 5 TABLET, FILM COATED ORAL at 15:30

## 2020-07-08 RX ADMIN — ACETAMINOPHEN 1000 MG: 500 TABLET ORAL at 07:34

## 2020-07-08 RX ADMIN — OXYCODONE HYDROCHLORIDE 10 MG: 5 TABLET ORAL at 19:04

## 2020-07-08 RX ADMIN — FENTANYL CITRATE 25 MCG: 50 INJECTION, SOLUTION INTRAMUSCULAR; INTRAVENOUS at 08:17

## 2020-07-08 RX ADMIN — TRANEXAMIC ACID 1000 MG: 1 INJECTION, SOLUTION INTRAVENOUS at 08:25

## 2020-07-08 RX ADMIN — LIDOCAINE HYDROCHLORIDE 40 MG: 20 INJECTION, SOLUTION EPIDURAL; INFILTRATION; INTRACAUDAL; PERINEURAL at 08:17

## 2020-07-08 RX ADMIN — PROPOFOL 75 MCG/KG/MIN: 10 INJECTION, EMULSION INTRAVENOUS at 08:17

## 2020-07-08 RX ADMIN — DEXAMETHASONE SODIUM PHOSPHATE 8 MG: 4 INJECTION, SOLUTION INTRAMUSCULAR; INTRAVENOUS at 08:35

## 2020-07-08 RX ADMIN — SODIUM CHLORIDE, PRESERVATIVE FREE 10 ML: 5 INJECTION INTRAVENOUS at 21:41

## 2020-07-08 RX ADMIN — FENTANYL CITRATE 100 MCG: 50 INJECTION INTRAMUSCULAR; INTRAVENOUS at 07:51

## 2020-07-08 RX ADMIN — PROPOFOL 70 MG: 10 INJECTION, EMULSION INTRAVENOUS at 08:17

## 2020-07-08 RX ADMIN — ACETAMINOPHEN 1000 MG: 500 TABLET ORAL at 15:30

## 2020-07-08 RX ADMIN — ONDANSETRON HYDROCHLORIDE 4 MG: 2 INJECTION, SOLUTION INTRAMUSCULAR; INTRAVENOUS at 11:11

## 2020-07-08 RX ADMIN — Medication 2 G: at 17:55

## 2020-07-08 RX ADMIN — SODIUM CHLORIDE, POTASSIUM CHLORIDE, SODIUM LACTATE AND CALCIUM CHLORIDE: 600; 310; 30; 20 INJECTION, SOLUTION INTRAVENOUS at 08:55

## 2020-07-08 RX ADMIN — CELECOXIB 100 MG: 100 CAPSULE ORAL at 07:35

## 2020-07-08 RX ADMIN — ROPIVACAINE HYDROCHLORIDE 30 ML: 5 INJECTION, SOLUTION EPIDURAL; INFILTRATION; PERINEURAL at 07:57

## 2020-07-08 RX ADMIN — GABAPENTIN 300 MG: 300 CAPSULE ORAL at 07:35

## 2020-07-08 RX ADMIN — ACETAMINOPHEN 1000 MG: 500 TABLET ORAL at 23:51

## 2020-07-08 RX ADMIN — ASPIRIN 325 MG: 325 TABLET, DELAYED RELEASE ORAL at 15:30

## 2020-07-08 RX ADMIN — SODIUM CHLORIDE: 9 INJECTION, SOLUTION INTRAVENOUS at 13:40

## 2020-07-08 RX ADMIN — BUPIVACAINE HYDROCHLORIDE IN DEXTROSE 1.6 ML: 7.5 INJECTION, SOLUTION SUBARACHNOID at 08:17

## 2020-07-08 ASSESSMENT — PULMONARY FUNCTION TESTS
PIF_VALUE: 1
PIF_VALUE: 0
PIF_VALUE: 1
PIF_VALUE: 1
PIF_VALUE: 0
PIF_VALUE: 1
PIF_VALUE: 0
PIF_VALUE: 1
PIF_VALUE: 0
PIF_VALUE: 1
PIF_VALUE: 1
PIF_VALUE: 0
PIF_VALUE: 1
PIF_VALUE: 0
PIF_VALUE: 1
PIF_VALUE: 0
PIF_VALUE: 1
PIF_VALUE: 0
PIF_VALUE: 1
PIF_VALUE: 0
PIF_VALUE: 1
PIF_VALUE: 0
PIF_VALUE: 1
PIF_VALUE: 0
PIF_VALUE: 1
PIF_VALUE: 0
PIF_VALUE: 1
PIF_VALUE: 0
PIF_VALUE: 1
PIF_VALUE: 1
PIF_VALUE: 0
PIF_VALUE: 1
PIF_VALUE: 0
PIF_VALUE: 1

## 2020-07-08 ASSESSMENT — PAIN - FUNCTIONAL ASSESSMENT
PAIN_FUNCTIONAL_ASSESSMENT: 0-10
PAIN_FUNCTIONAL_ASSESSMENT: ACTIVITIES ARE NOT PREVENTED

## 2020-07-08 ASSESSMENT — PAIN SCALES - GENERAL
PAINLEVEL_OUTOF10: 0
PAINLEVEL_OUTOF10: 0
PAINLEVEL_OUTOF10: 5
PAINLEVEL_OUTOF10: 0
PAINLEVEL_OUTOF10: 0
PAINLEVEL_OUTOF10: 3
PAINLEVEL_OUTOF10: 0
PAINLEVEL_OUTOF10: 7
PAINLEVEL_OUTOF10: 0
PAINLEVEL_OUTOF10: 0
PAINLEVEL_OUTOF10: 5

## 2020-07-08 ASSESSMENT — PAIN DESCRIPTION - PAIN TYPE
TYPE: SURGICAL PAIN

## 2020-07-08 ASSESSMENT — PAIN DESCRIPTION - FREQUENCY
FREQUENCY: CONTINUOUS

## 2020-07-08 ASSESSMENT — PAIN DESCRIPTION - PROGRESSION
CLINICAL_PROGRESSION: GRADUALLY WORSENING

## 2020-07-08 ASSESSMENT — PAIN DESCRIPTION - ORIENTATION
ORIENTATION: RIGHT

## 2020-07-08 ASSESSMENT — PAIN DESCRIPTION - ONSET
ONSET: ON-GOING

## 2020-07-08 ASSESSMENT — PAIN DESCRIPTION - DESCRIPTORS
DESCRIPTORS: ACHING

## 2020-07-08 ASSESSMENT — PAIN DESCRIPTION - LOCATION
LOCATION: KNEE

## 2020-07-08 NOTE — OP NOTE
area.  She then came to the operating room  where she underwent spinal anesthesia. The patient was positioned  supine on the operative table. Tourniquet was applied around the right  thigh proximally. Right lower extremity was prepped and draped in the  usual sterile manner. We inflated the tourniquet to 250 mmHg. We used  the patient's previous surgical scar as our arthrotomy line. We excised  the old scar tissue and the skin. We carried this incision through the  subcutaneous layer down to the joint capsule. We made a medial  parapatellar arthrotomy splitting the quadriceps tendon proximally. There were only two nonabsorbable sutures noted at the superomedial and  inferomedial corners of the patella. They did serve as a marker for our  exposure. We took down the proximal medial tibial tissue in a  subperiosteal manner. We _____ patella. The patella was quite warm  with erosion of the patella laterally. We made a patellar cut and sized  the patella to a 32 mm patellar component. We drilled for the lugs and  applied patellar protecting plate. We flexed the knee and subluxed the  patella laterally. At this point, we gained exposure to the components. We removed any soft tissue around the margins of our components of the  tibia and the femur. We removed the polyethylene component without  difficulty. We worked at the [employer] of the femoral  component both medially and then laterally using quarter-inch and  half-inch straight and curved osteotomes to gradually loosen the femoral  component, which was then removed. We had minimal amount of bone loss,  but the patient's cancellous bone was quite soft. We removed all loose  cement fragments. We then went to the tibia. We used a narrow blade  with an oscillating saw at the undersurface of the tibial component to  loosen the implant-cement interface.   We then used our osteotomes to  work around any areas not reached by the oscillating saw. We then used  a bone tamp to tap upward on the undersurface of the tibial tray to  gradually loosen it, so it could be removed also with minimal bone loss. The remaining cement plug in the tibia was removed piecemeal using a  quarter-inch straight osteotome and mallet. At this point, we began  instrumentation of the tibia. We used our straight reamers and worked  our way up to a 17 mm diameter reamer from the tibia. We then used the  proximal tibial cutting jig attached to the reamer. We freshened up our  proximal tibia with good remaining bone stock. We sized the tibia to a  size 4 tibial component. We then used the offset adaptor and chose a 4  mm offset at 4 o'clock position giving us best proximal tibial bone  coverage. We secured the tibial tray trial and then prepared for the  keel. We then put together our trial component after repairing for the  keel and the stem. We put together the size 4 trial with a 17 mm  diameter stem set at 4 mm in 4 o'clock position. With the tibial trial  in place, we then went to the femur. With our tibia well protected, we  established access in the intramedullary canal of the femur. We worked  our way up to a 19 mm diameter reamer with good stability. We then  applied our distal cutting jig and using the medial epicondyle as a  reference we stabilized the distal femoral cutting block. We found that  we would require 10 mm distal augments medially and laterally on the  femur. We then applied the size 4 cutting block for the right knee with  10 mm augments in place. We positioned it rotationally _____ from the  epicondylar access. We secured the cutting block. We found that we  needed a 2 mm offset at 8 o'clock position to give us proper positioning  and coverage on the distal femur. We then made our posterior condylar  cuts, our anterior cuts, and checked on our chamfers to be sure we had  good positioning.   We did take just a bit of lateral bone anteriorly and  at the anterolateral chamfer. We then prepared for the box cut for the  TS device. We then put together the trial size 4 right TS femur with a  19 mm x 100 mm stem with 2 mm offset at 8 o'clock position with 10 mm  distal augments medially and laterally and a 5 mm posterolateral  augment. With our trial femur in place, we then inserted a 19 mm TS  poly trial and found this gave us good stability in both flexion and  extension. We were able to obtain full extension and flexed to  approximately 110 to 115 degrees on the table. We then applied the 32  mm trial patellar component and found we had very acceptable patellar  tracking. There was slight lateral tilt of the patella, but no  subluxation. We therefore chose this combination of implants. At this  point, we were at 113 minutes of tourniquet time. We removed our  trials, inserted sponges within the joint, and let the tourniquet down  to allow reperfusion of the leg while our revision implants were put  together on the back table. We constructed all of our implants with  stems and all augments, etc. on the back table. Once this had been  accomplished, we then re-inflated the tourniquet. Copiously irrigated  all bone cut surfaces and dried all bone cut surfaces in preparation for  cementing. We mixed cement on the back table and then cemented in our  components as described above, starting with the size 4 Universal tibia  with a 17 x 100 mm stem at 4 mm offset at 4 o'clock position. We  cleared away cement with the implant fully seated and cemented in the  size 4 right TS femur with a 19 mm x 100 mm stem, 2 mm offset at 8  o'clock with a 10 mm distal medial and lateral augments and a 5 mm  posterolateral augment. We again cleared away extraneous cement. We  inserted a 19 mm TS poly and put the knee in extension to get full  compression for implants.   We then copiously irrigated the patella,  dried the patella, and cemented in our size 32 mm patellar component. We applied the clamp, removed the clamp, and removed extraneous cement. We copiously irrigated the knee. We had very nice stability of our knee  through full range of motion and very acceptable patellar tracking. We  then closed the capsule with #1 running Stratafix suture. It should be  noted we did inject local anesthetic and Toradol in the epicondylar  areas and posterior capsule prior to inserting our components. We then  used the same cocktail to inject the subcu area. We closed the subcu  layer with 2-0 Vicryl inverted suture and the skin with a running 3-0  Monocryl subcuticular stitch. Steri-Strips were applied to the skin. Sterile dressing was applied with Aquacel dressing. Thigh-high  compression stocking was applied. Tourniquet was let down and the  patient was retrieved from anesthesia having tolerated the procedure  well. All needle, sponge, and instrument counts were correct. SUMMARY OF IMPLANTS:  We used a [employer] TS revision system. FEMUR:  Size 4, right, TS.    FEMORAL STEM:  19 mm x 100 mm, 2 mm offset at 8 o)clock. FEMORAL AUGMENTS:  10 mm distal medial and distal lateral, 5 mm  posterolateral.    TIBIA:  Size 4, universal.    TIBIAL STEM:  17 mm x 100 mm, 4 mm offset at 4 o)clock position. POLYETHYLENE COMPONENT:  19 mm, TS for the size 4 tibia. PATELLA:  32 mm.     It should be noted that Tarun London PA-C, was present throughout the  entirety of the procedure including patient positioning, prepping and  draping, approach, removal of implants, preparation for insertion of  revision knee implants, preparation of bone and components and insertion  of the new implants, wound closure, application of dressing, and  supervision of the patient leaving the Emily Guerra MD    D: 07/08/2020 11:59:18       T: 07/08/2020 16:09:50     [email or web address]  Job#: [id number]     Doc#: [id number]    CC:

## 2020-07-08 NOTE — ANESTHESIA POSTPROCEDURE EVALUATION
Department of Anesthesiology  Postprocedure Note    Patient: Tawny Church  MRN: [id number]  YOB: 1940  Date of evaluation: 7/8/2020  Time:  1:22 PM     Procedure Summary     Date:  07/08/20 Room / Location:  [facility]    Anesthesia Start:  7986 Anesthesia Stop:  5770    Procedure:  RIGHT KNEE TOTAL ARTHROPLASTY REVISION  ++LUCY++   ++PNB++ (Right Knee) Diagnosis:  (FAILED PROSTHESIS)    Surgeon:  Randi Dumont MD Responsible Provider:  Rich Leon MD    Anesthesia Type:  spinal, regional ASA Status:  2          Anesthesia Type: spinal, regional    Damian Phase I: Damian Score: 9    Damian Phase II:      Last vitals: Reviewed and per EMR flowsheets.        Anesthesia Post Evaluation    Patient location during evaluation: PACU  Patient participation: complete - patient participated  Level of consciousness: awake and alert  Pain score: 4  Airway patency: patent  Nausea & Vomiting: no vomiting and no nausea  Complications: no  Cardiovascular status: hemodynamically stable  Respiratory status: spontaneous ventilation  Hydration status: stable

## 2020-07-08 NOTE — PROGRESS NOTES
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  3 steps/rail to enter. Walk in shower    Prior Level of Function: Independent  with ADLs , assist as needed with IADLs; ambulated cane   Driving: yes     Pain Level: surgical pain ;   Cognition:  Ox3, alert and conversing     Judgement/safety:  Good               Memory WFLS     Functional Assessment:   Initial Eval Status  Date: 20 Tx Session  STGs = LTGs  1-2 weeks    Feeding Independent      Grooming Set-up ,seated   Mod I    UB Dressing Set-up   Mod I    LB Dressing Max A   Mod I    Bathing Mod A   Mod I    Toileting Assist with thorough hygiene   Independent    Bed Mobility  Min A  Supine to sit      Functional Transfers Min A  Sit-stand from bed     Patient reports she has hx of passing out after past surgeries   Upon sitting EOB - patient did report she was seeing \"floaters\",   O2 sats on room dropped to 76%- O2 applied to 4 L- improved to 94% with time and encouraging deep breathing tech. Floaters subsided, no complaints of SOB   (Nurse informed)   Mod I    Functional Mobility Min A,w/walker, O2 on  Short distance in room     Patient stated she felt good to sit in chair.    present in room  Mod I with good tolerance    Balance Sitting:     Static:  SBA    Standing: Min A     Activity Tolerance Fair with light activity   Good  with ADL activity   Visual/  Perceptual Glasses: yes                 Right  hand dominant    Strength ROM Additional Info:    RUE  4-/5  [facility] Evaluation time includes thorough review of current medical information, gathering information on past medical history/social history and prior level of function, completion of standardized testing/informal observation of tasks, assessment of data, and development of POC/Goals      Samuel Hassan OTR/L [id number]

## 2020-07-08 NOTE — PROGRESS NOTES
Physical Therapy    Facility/Department: [facility] SURGERY  Initial Assessment    NAME: Michelle Paige  : 1940  MRN: [id number]    Date of Service: 2020       REQUIRES PT FOLLOW UP: Yes       Patient Diagnosis(es): The primary encounter diagnosis was Osteoarthritis of right patellofemoral joint. A diagnosis of Preop testing was also pertinent to this visit. has a past medical history of Acid reflux disease, Anxiety, Arthritis, Central retinal vein occlusion of right eye, Glaucoma, left eye, History of fracture of femur, History of trigeminal neuralgia, Hyperlipidemia, Hypoglycemia, Right knee pain, Symptoms consistent with irritable bowel syndrome, Use of cane as ambulatory aid, and Wears glasses. has a past surgical history that includes Tonsillectomy; Breast lumpectomy; Cataract removal with implant (Right, ); other surgical history; joint replacement; Total knee arthroplasty (Bilateral, ); Cholecystectomy (); Breast biopsy (Bilateral,  right ,  left); and Revision total knee arthroplasty (Right, 2020). Evaluating Therapist: Angella Saunders PT     rec ww     Referring Provider:  Beatriz Owens MD    Room #: 879   DIAGNOSIS:  OA s/p R TKA revision  2020  PRECAUTIONS: falls, FWBAT     Social:  Pt lives with spouse  in a  2  floor plan  With first floor set up,  steps and 1 rails to enter. Prior to admission pt walked with cane      Initial Evaluation  Date:  2020 Treatment      Short Term/ Long Term   Goals   Was pt agreeable to Eval/treatment? yes      Does pt have pain?  4/10 R knee      Bed Mobility  Rolling:  NT   Supine to sit:  Min assist   Sit to supine:  NT   Scooting: min assist    SBA    Transfers Sit to stand: min assist   Stand to sit: min assist   Stand pivot:  NT    SBA    Ambulation    20  feet with  ww  with  Min assist  200 feet with  ww  with SBA        Stair negotiation: ascended and descended NT    4-12  steps with  1 rail with  AAD, SBA LE ROM  AAROM R knee 5-90 degrees      LE strength  4-/ 5 R knee in available range      AM- PAC RAW score   16/ 24            Pt is alert and Oriented x  3      Balance:  Min assist, fall risk     Endurance: decreased   Bed/Chair alarm:  no     ASSESSMENT  Pt displays functional ability as noted in the objective portion of this evaluation. Treatment/Education:     Mobility as above. Pt reports she tends to pass out after surgery, was seeing \"floaters\" and states this is a sign. Limited gait distance due to this. Pt left in chair stating she felt better and no longer had \"floaters. \"   Pulse ox with O2 at rest 97%, on RA after getting to EOB, pulse ox decreased to 76%. O2 re-applied at 3 LNC and recovered to 85%. Increased to 4 LNC and improved to 97%. 97% after mobility with O2 @ 4 LNC. RN informed     Pt educated on fall risk,  LE exercises, hand placement with transfers, ww safety, safety with mobility        Patient response to education:   Pt verbalized understanding Pt demonstrated skill Pt requires further education in this area    x With cues   x       Comments:  Pt left  In chair after session, with call light in reach. Rehab potential is Good for reaching above PT goals. Pts/ family goals   1. Home     Patient and or family understand(s) diagnosis, prognosis, and plan of care. -  Yes     PLAN  PT care will be provided in accordance with the objectives noted above. Whenever appropriate, clear delegation orders will be provided for nursing staff. Exercises and functional mobility practice will be used as well as appropriate assistive devices or modalities to obtain goals. Patient and family education will also be administered as needed. Frequency of treatments will be BID x 3 days.     Time in:  1446   Time out:  1512       Evaluation Time includes thorough review of current medical information, gathering information on past medical history/social history and prior level of function,

## 2020-07-08 NOTE — BRIEF OP NOTE
Brief Postoperative Note      Patient: Román Gutierrez  YOB: 1940  MRN: [id number]    Date of Procedure: 7/8/2020    Pre-Op Diagnosis: Right TKA FAILED PROSTHESIS with OA right patella    Post-Op Diagnosis: Same       Procedure(s):  RIGHT KNEE TOTAL ARTHROPLASTY REVISION  ++LUCY++   ++PNB++    Surgeon(s):  Nicci Liu MD    Assistant:  Volodymyr Ibanez. ASHKAN Moreno    Anesthesia: Spianl    Estimated Blood Loss (mL): less than 173     Complications: None    Specimens:   ID Type Source Tests Collected by Time Destination   A : BONE RIGHT KNEE Bone Bone SURGICAL PATHOLOGY Nicci Liu MD 7/8/2020 3233    B : HARDWARE RIGHT KNEE Hardware Hardware SURGICAL PATHOLOGY Nicci Liu MD 7/8/2020 2082        Implants:  Implant Name Type Inv.  Item Serial No.  Lot No. LRB No. Used Action   CEMENT BARIUM RADIOPQ FULL 40GR Cement CEMENT BARIUM RADIOPQ FULL 40GR  LUCY: ORTH[employer] [id number] Right 1 Implanted   CEMENT BARIUM RADIOPQ FULL 40GR Cement CEMENT BARIUM RADIOPQ FULL 40GR  LUCY: ORTHOP[employer] [id number] Right 1 Implanted   IMPL KNEE STEM TRIATHLON PRESS [id number] Knee IMPL KNEE STEM TRIATHLON PRESS [id number]  LUCY: ORTHOP[employer] [id number] Right 1 Implanted   IMPL KNEE STEM TRIATHLON OFFST ADAPT 2MM Knee IMPL KNEE STEM TRIATHLON OFFST ADAPT 2MM  LUCY: ORTHOP[employer] [id number] Right 1 Implanted   IMPL KNEE FEM COMP DISTL POSTR SZ 4 5MM Knee IMPL KNEE FEM COMP DISTL POSTR SZ 4 5MM  LUCY: ORTHOPAEDICS [id number] Right 1 Implanted   IMPL KNEE AUGMENT FEM DISTAL RT 4 10MM Knee IMPL KNEE AUGMENT FEM DISTAL RT 4 10MM  LUCY: ORTHOPAEDICS [id number] Right 1 Implanted   IMPL KNEE AUGMENT FEM DISTAL RT 4 10MM Knee IMPL KNEE AUGMENT FEM DISTAL RT 4 10MM  LUCY: ORTHOPAEDICS [id number] Right 1 Implanted   IMPL KNEE TIB INSRT TRIATHLON SZ 4 19MM Knee IMPL KNEE TIB INSRT TRIATHLON SZ 4 19MM  LUCY: ORTHOPAEDICS [id number] Right 1 Implanted   IMPL KNEE FEM COMP TOT STBL SZ 4 Knee IMPL KNEE FEM COMP TOT STBL SZ 4  LUCY: ORTHOPAEDICS [id number] Right 1 Implanted   IMPL KNEE PATELLA ASYM X3 [id number] Knee IMPL KNEE PATELLA ASYM X3 [id number]  LUCY: ORTHOPAEDICS [id number] Right 1 Implanted   IMPL KNEE STEM TRIATHLON PRESS [id number] Knee IMPL KNEE STEM TRIATHLON PRESS [id number]  LUCY: ORTHOPA[employer] [id number] Right 1 Implanted   IMPL KNEE STEM TRIATHLON OFFST ADAPT 4MM Knee IMPL KNEE STEM TRIATHLON OFFST ADAPT 4MM  LUCY: ORTHOPA[employer]ICS [id number] Right 1 Implanted   IMPL KNEE TIB BASEPLT TOT STBL SZ 4 Knee IMPL KNEE TIB BASEPLT TOT STBL SZ 4  LUCY: ORTHOPAEDICS [id number] Right 1 Implanted         Drains:   Urethral Catheter (Active)       [REMOVED] Urethral Catheter Straight-tip 16 fr (Removed)       Findings: Failed prosthesis, unresurfaced patella    Electronically signed by Leonardo Roberson MD on 7/8/2020 at 11:14 AM

## 2020-07-08 NOTE — PROGRESS NOTES
Educated patient on the importance of Incentive Spirometer, Patient returned demonstration of 1500 , tolerated well.

## 2020-07-08 NOTE — CARE COORDINATION
Case Management: Social Work Case Management Initial Assessment  Bianka Russo,         Met with:patient  Information verified: address, contacts, phone number, , insurance Yes  PCP: Da Chavez MD  Pharmacy:   [facility] [address], [address] [facility]  [address] [address]  Phone: [phone] Fax: [address], [address] [place] [address] [phone] Krishna Hudson [phone]  [address] [place] [address]  [address] [address] [place] [address]  Phone: [phone] Fax: [phone]         Discharge Planning  Patient Status Order: Inpatient Date: 7-8  Readmission within last 30 days:  no  Current Residence: Private Residence  Living Arrangements:  Spouse/Significant Other   Home Access: ranch  Entrance Stairs-Number of Steps: n/a  Support Systems:  Spouse/Significant Other  Current Services PTA: None Supplier: n/a  Patient able to perform ADL's: will need assistance  DME used to aid ambulation prior to admission: [employer]    Potential Assistance Needed:  N/A  Potential Assistance Purchasing Medications:  No  Does patient want to participate in local refill/meds to beds program?: Yes    Patient agreeable to home care: Yes  Type of Home Care Services:  None  Patient expects to be discharged to:  home    Prior SNF/Rehab Placement and Facility: no  Agreeable to SNF/Rehab: No    Choices given to patient: yes    Expected Discharge date:  20  Follow Up Appointment: Best Day/ Time: Monday AM    Social Work Assessment/Plan: Social service met with Mrs. Debbie Horowitz and her  & welcomed her to the unit, as well as explained social work role, and discussed discharge planning. Mrs. Debbie Horowitz resides in a ranch home and has a wheeled walker, high commode and walk-in shower on the main floor of her home. She plans to return [facility] with [facility], after choices were provided and has no other discharge needs.   A referral was given to Brandi Santiago at [facility] today.      Electronically signed by PATO Stevenson on 7/8/20 at 2:34 PM EDT

## 2020-07-08 NOTE — CONSULTS
[address]      Reason for Consult:  Medical management    History of Present Illness:  70 y.o. female with a history of hyperlipidemia, glaucoma, GERD presents revision of right total knee arthroplasty. Patient who had right knee pain, more due to osteoarthritis within the patellofemoral joint. Informant(s) for H&P: Patient    REVIEW OF SYSTEMS:  Complete ROS performed with patient, pertinent positives and negatives are listed in the HPI. PMH:  Past Medical History:   Diagnosis Date    Acid reflux disease     Anxiety     Arthritis     Central retinal vein occlusion of right eye     with vision deficets    Glaucoma, left eye     History of fracture of femur 2006    right; no surgery    History of trigeminal neuralgia     Hyperlipidemia     Hypoglycemia     Right knee pain 07/2020    for TJAK 07/08/2020 Dr KEMI Fajardo Symptoms consistent with irritable bowel syndrome     Use of cane as ambulatory aid     pain    Wears glasses        Surgical History:  Past Surgical History:   Procedure Laterality Date    BREAST BIOPSY Bilateral 2015 right , 2020 left    BREAST LUMPECTOMY      CATARACT REMOVAL WITH IMPLANT Right 2005    CHOLECYSTECTOMY  2015    JOINT REPLACEMENT      OTHER SURGICAL HISTORY      arthroscopic thumb bilateral    TONSILLECTOMY      TOTAL KNEE ARTHROPLASTY Bilateral 2013       Medications Prior to Admission:    Prior to Admission medications    Medication Sig Start Date End Date Taking?  Authorizing Provider   naproxen (NAPROSYN) 500 MG tablet Take 500 mg by mouth every 12 hours as needed Last taken 06/30/2020   Yes Historical Provider, MD   latanoprost (XALATAN) 0.005 % ophthalmic solution Place 1 drop into both eyes nightly  5/15/17  Yes Historical Provider, MD   fluticasone (FLONASE) 50 MCG/ACT nasal spray 2 sprays by Nasal route daily  2/23/19  Yes Historical Provider, MD   lovastatin (MEVACOR) 40 MG tablet Take 40 mg by mouth nightly  8/18/17  Yes Historical Provider, MD   PARoxetine (PAXIL) 20 MG tablet Take 20 mg by mouth every morning  9/15/17  Yes Historical Provider, MD   omeprazole (PRILOSEC) 20 MG capsule Take 40 mg by mouth daily. Yes Historical Provider, MD   gabapentin (NEURONTIN) 300 MG capsule Take 300 mg by mouth as needed. 5/21/19   Historical Provider, MD   Calcium Carb-Cholecalciferol (CALCIUM + D3 PO) Take 1 tablet by mouth 2 times daily Last  06/30/2020    Historical Provider, MD       Allergies:    Adhesive tape; Augmentin [amoxicillin-pot clavulanate]; and Penicillins    Social History:    reports that she has never smoked. She has never used smokeless tobacco. She reports current alcohol use of about 7.0 standard drinks of alcohol per week. She reports that she does not use drugs. Family History:   History reviewed. No pertinent family history.       PHYSICAL EXAM:  Vitals:  /64   Pulse 71   Temp 98 °F (36.7 °C) (Oral)   Resp 15   Ht 5' 4\" (1.626 m)   Wt 192 lb (87.1 kg)   SpO2 98%   BMI 32.96 kg/m²   General Appearance: alert and oriented to person, place and time, well-developed and well-nourished, in no acute distress  Skin: warm and dry, no rash or erythema  Head: normocephalic and atraumatic  Eyes: pupils equal, round, and reactive to light, extraocular eye movements intact, conjunctivae normal  ENT: tympanic membrane, external ear and ear canal normal bilaterally, oropharynx clear and moist with normal mucous membranes  Neck: neck supple and non tender without mass, no thyromegaly or thyroid nodules, no cervical lymphadenopathy   Pulmonary/Chest: clear to auscultation bilaterally- no wheezes, rales or rhonchi, normal air movement, no respiratory distress  Cardiovascular: normal rate, normal S1 and S2, no gallops, intact distal pulses and no carotid bruits  Abdomen: soft, non-tender, non-distended, normal bowel sounds, no masses or organomegaly      LABS:  No results for input(s): NA, K, CL, CO2, BUN, CREATININE, GLUCOSE, CALCIUM in the last 72 hours. No results for input(s): WBC, RBC, HGB, HCT, MCV, MCH, MCHC, RDW, PLT, MPV in the last 72 hours. Radiology:   XR KNEE RIGHT (1-2 VIEWS)    (Results Pending)       EKG:       ASSESSMENT   AND  PLAN :    1. Osteoarthritis of the right patellofemoral joint, now right knee revision arthroplasty, management as per primary service. 2. GERD, continue PPI  3. Hyperlipidemia, will continue home medications. 4. DVT prophylaxis, patient remains on aspirin. Thank you very much for this interesting consult and we will continue to follow along. Feel free to call with any questions at [phone]. Electronically signed by Marguerite Soler MD on 7/8/2020 at 2:30 PM    NOTE: This report was transcribed using voice recognition software.  Every effort was made to ensure accuracy; however, inadvertent computerized transcription errors may be present.

## 2020-07-08 NOTE — ANESTHESIA PROCEDURE NOTES
Peripheral Block    Patient location during procedure: procedure area  Start time: 7/8/2020 7:50 AM  End time: 7/8/2020 7:55 AM  Staffing  Anesthesiologist: Ashanti Sanchez MD  Resident/CRNA: REFUGIO Sahu - LATISHA  Other anesthesia staff: Baudilio Hurtado RN  Performed: other anesthesia staff and anesthesiologist   Preanesthetic Checklist  Completed: patient identified, site marked, surgical consent, pre-op evaluation, timeout performed, IV checked, risks and benefits discussed, monitors and equipment checked, anesthesia consent given, oxygen available and patient being monitored  Peripheral Block  Patient position: supine  Prep: ChloraPrep  Patient monitoring: cardiac monitor, continuous pulse ox, frequent blood pressure checks and IV access  Block type: Femoral  Laterality: right  Injection technique: single-shot  Procedures: ultrasound guided  Local infiltration: ropivacaine  Infiltration strength: 0.5 %  Dose: 30 mL  Adductor canal  Provider prep: mask and sterile gloves  Local infiltration: ropivacaine  Needle  Needle type: combined needle/nerve stimulator   Needle gauge: 20 G  Needle length: 4 inch. Needle localization: anatomical landmarks and ultrasound guidance  Assessment  Injection assessment: negative aspiration for heme, no paresthesia on injection and local visualized surrounding nerve on ultrasound  Paresthesia pain: none  Slow fractionated injection: yes  Hemodynamics: stable  Additional Notes  Pt tolerated procedure well, VSS.  Pt had versed 1mg, 50mcg fent  Medications Administered  Ropivacaine (NAROPIN) injection 0.5%, 30 mL  Reason for block: post-op pain management and at surgeon's request

## 2020-07-08 NOTE — PLAN OF CARE
Problem: Falls - Risk of:  Goal: Will remain free from falls  Description: Will remain free from falls  Outcome: Met This Shift     Problem: Discharge Planning:  Goal: Discharged to appropriate level of care  Description: Discharged to appropriate level of care  Outcome: Met This Shift     Problem: Mobility - Impaired:  Goal: Mobility will improve  Description: Mobility will improve  Outcome: Met This Shift

## 2020-07-09 ENCOUNTER — APPOINTMENT (OUTPATIENT)
Dept: CT IMAGING | Age: 80
DRG: 466 | End: 2020-07-09
Attending: ORTHOPAEDIC SURGERY
Payer: MEDICARE

## 2020-07-09 LAB
D DIMER: 2284 NG/ML DDU
HCT VFR BLD CALC: 28.9 % (ref 34–48)
HEMOGLOBIN: 9.1 G/DL (ref 11.5–15.5)

## 2020-07-09 PROCEDURE — 85014 HEMATOCRIT: CPT

## 2020-07-09 PROCEDURE — 97530 THERAPEUTIC ACTIVITIES: CPT

## 2020-07-09 PROCEDURE — 99232 SBSQ HOSP IP/OBS MODERATE 35: CPT | Performed by: INTERNAL MEDICINE

## 2020-07-09 PROCEDURE — 2700000000 HC OXYGEN THERAPY PER DAY

## 2020-07-09 PROCEDURE — 6370000000 HC RX 637 (ALT 250 FOR IP): Performed by: INTERNAL MEDICINE

## 2020-07-09 PROCEDURE — 85378 FIBRIN DEGRADE SEMIQUANT: CPT

## 2020-07-09 PROCEDURE — 2580000003 HC RX 258: Performed by: ORTHOPAEDIC SURGERY

## 2020-07-09 PROCEDURE — 6360000004 HC RX CONTRAST MEDICATION: Performed by: RADIOLOGY

## 2020-07-09 PROCEDURE — 36415 COLL VENOUS BLD VENIPUNCTURE: CPT

## 2020-07-09 PROCEDURE — 71275 CT ANGIOGRAPHY CHEST: CPT

## 2020-07-09 PROCEDURE — 6360000002 HC RX W HCPCS: Performed by: ORTHOPAEDIC SURGERY

## 2020-07-09 PROCEDURE — 6370000000 HC RX 637 (ALT 250 FOR IP): Performed by: ORTHOPAEDIC SURGERY

## 2020-07-09 PROCEDURE — 97535 SELF CARE MNGMENT TRAINING: CPT

## 2020-07-09 PROCEDURE — 85018 HEMOGLOBIN: CPT

## 2020-07-09 PROCEDURE — 1200000000 HC SEMI PRIVATE

## 2020-07-09 RX ORDER — SODIUM CHLORIDE 0.9 % (FLUSH) 0.9 %
10 SYRINGE (ML) INJECTION PRN
Status: DISCONTINUED | OUTPATIENT
Start: 2020-07-09 | End: 2020-07-10 | Stop reason: HOSPADM

## 2020-07-09 RX ORDER — ACETAMINOPHEN 500 MG
1000 TABLET ORAL EVERY 8 HOURS
Qty: 180 TABLET | Refills: 0 | Status: SHIPPED | OUTPATIENT
Start: 2020-07-09

## 2020-07-09 RX ORDER — ASPIRIN 325 MG
325 TABLET, DELAYED RELEASE (ENTERIC COATED) ORAL DAILY
Qty: 28 TABLET | Refills: 0 | Status: SHIPPED | OUTPATIENT
Start: 2020-07-09 | End: 2020-07-10 | Stop reason: HOSPADM

## 2020-07-09 RX ORDER — CALCIUM CARBONATE 200(500)MG
500 TABLET,CHEWABLE ORAL 3 TIMES DAILY PRN
Status: DISCONTINUED | OUTPATIENT
Start: 2020-07-09 | End: 2020-07-10 | Stop reason: HOSPADM

## 2020-07-09 RX ORDER — OXYCODONE HYDROCHLORIDE 5 MG/1
5-10 TABLET ORAL EVERY 4 HOURS PRN
Qty: 70 TABLET | Refills: 0 | Status: SHIPPED | OUTPATIENT
Start: 2020-07-09 | End: 2020-07-16

## 2020-07-09 RX ADMIN — OXYCODONE HYDROCHLORIDE 10 MG: 5 TABLET ORAL at 08:51

## 2020-07-09 RX ADMIN — APIXABAN 10 MG: 5 TABLET, FILM COATED ORAL at 21:22

## 2020-07-09 RX ADMIN — OXYCODONE HYDROCHLORIDE 10 MG: 5 TABLET ORAL at 16:34

## 2020-07-09 RX ADMIN — DOCUSATE SODIUM 50 MG AND SENNOSIDES 8.6 MG 1 TABLET: 8.6; 5 TABLET, FILM COATED ORAL at 21:22

## 2020-07-09 RX ADMIN — ATORVASTATIN CALCIUM 20 MG: 20 TABLET, FILM COATED ORAL at 08:51

## 2020-07-09 RX ADMIN — DEXAMETHASONE SODIUM PHOSPHATE 10 MG: 10 INJECTION INTRAMUSCULAR; INTRAVENOUS at 11:22

## 2020-07-09 RX ADMIN — LATANOPROST 1 DROP: 50 SOLUTION OPHTHALMIC at 21:22

## 2020-07-09 RX ADMIN — DOCUSATE SODIUM 50 MG AND SENNOSIDES 8.6 MG 1 TABLET: 8.6; 5 TABLET, FILM COATED ORAL at 08:51

## 2020-07-09 RX ADMIN — CALCIUM CARBONATE 500 MG: 500 TABLET, CHEWABLE ORAL at 21:22

## 2020-07-09 RX ADMIN — SODIUM CHLORIDE, PRESERVATIVE FREE 10 ML: 5 INJECTION INTRAVENOUS at 21:22

## 2020-07-09 RX ADMIN — OXYCODONE HYDROCHLORIDE 10 MG: 5 TABLET ORAL at 13:07

## 2020-07-09 RX ADMIN — ASPIRIN 325 MG: 325 TABLET, DELAYED RELEASE ORAL at 08:51

## 2020-07-09 RX ADMIN — ACETAMINOPHEN 1000 MG: 500 TABLET ORAL at 15:26

## 2020-07-09 RX ADMIN — OYSTER SHELL CALCIUM WITH VITAMIN D 1 TABLET: 500; 200 TABLET, FILM COATED ORAL at 08:51

## 2020-07-09 RX ADMIN — PAROXETINE HYDROCHLORIDE 20 MG: 20 TABLET, FILM COATED ORAL at 08:51

## 2020-07-09 RX ADMIN — IOPAMIDOL 90 ML: 755 INJECTION, SOLUTION INTRAVENOUS at 14:59

## 2020-07-09 RX ADMIN — GABAPENTIN 300 MG: 300 CAPSULE ORAL at 21:22

## 2020-07-09 RX ADMIN — SODIUM CHLORIDE, PRESERVATIVE FREE 10 ML: 5 INJECTION INTRAVENOUS at 08:52

## 2020-07-09 RX ADMIN — PANTOPRAZOLE SODIUM 40 MG: 40 TABLET, DELAYED RELEASE ORAL at 07:01

## 2020-07-09 RX ADMIN — ACETAMINOPHEN 1000 MG: 500 TABLET ORAL at 07:01

## 2020-07-09 ASSESSMENT — PAIN DESCRIPTION - FREQUENCY
FREQUENCY: CONTINUOUS

## 2020-07-09 ASSESSMENT — PAIN DESCRIPTION - LOCATION
LOCATION: KNEE

## 2020-07-09 ASSESSMENT — PAIN DESCRIPTION - ORIENTATION
ORIENTATION: RIGHT

## 2020-07-09 ASSESSMENT — PAIN DESCRIPTION - PROGRESSION
CLINICAL_PROGRESSION: GRADUALLY IMPROVING
CLINICAL_PROGRESSION: GRADUALLY IMPROVING
CLINICAL_PROGRESSION: GRADUALLY WORSENING
CLINICAL_PROGRESSION: GRADUALLY IMPROVING
CLINICAL_PROGRESSION: GRADUALLY WORSENING
CLINICAL_PROGRESSION: GRADUALLY WORSENING

## 2020-07-09 ASSESSMENT — PAIN DESCRIPTION - DESCRIPTORS
DESCRIPTORS: ACHING

## 2020-07-09 ASSESSMENT — PAIN - FUNCTIONAL ASSESSMENT
PAIN_FUNCTIONAL_ASSESSMENT: ACTIVITIES ARE NOT PREVENTED

## 2020-07-09 ASSESSMENT — PAIN DESCRIPTION - ONSET
ONSET: ON-GOING

## 2020-07-09 ASSESSMENT — PAIN SCALES - GENERAL
PAINLEVEL_OUTOF10: 5
PAINLEVEL_OUTOF10: 0
PAINLEVEL_OUTOF10: 0
PAINLEVEL_OUTOF10: 3
PAINLEVEL_OUTOF10: 8
PAINLEVEL_OUTOF10: 2
PAINLEVEL_OUTOF10: 0
PAINLEVEL_OUTOF10: 8
PAINLEVEL_OUTOF10: 7

## 2020-07-09 ASSESSMENT — PAIN DESCRIPTION - PAIN TYPE
TYPE: ACUTE PAIN
TYPE: SURGICAL PAIN

## 2020-07-09 NOTE — PLAN OF CARE
Problem: Falls - Risk of:  Goal: Will remain free from falls  Description: Will remain free from falls  7/9/2020 0817 by David Sheridan RN  Outcome: Met This Shift  7/9/2020 0136 by Abel Soliz RN  Outcome: Met This Shift     Problem: Falls - Risk of:  Goal: Absence of physical injury  Description: Absence of physical injury  7/9/2020 0136 by Abel Soliz RN  Outcome: Met This Shift     Problem: Discharge Planning:  Goal: Discharged to appropriate level of care  Description: Discharged to appropriate level of care  Outcome: Met This Shift

## 2020-07-09 NOTE — PROGRESS NOTES
Patient aware of CT results and the starting of eliquis. All questions answers. I sat and discussed plan of care with patient and patient and  also talked with Nghia Beard over the phone. Resting comfortably at this time, 2L NC on spO2 93% while resting.

## 2020-07-09 NOTE — PROGRESS NOTES
Mod I with good tolerance    Balance Sitting:     Static:  SBA    Standing: Min A       Activity Tolerance Fair with light activity  Fair +   Good  with ADL activity       Comments:  Fatigue noted during activity. Pt plans to return home. Declined use of adaptive equipment at this time. O2 saturation at rest 96%. O2 removed during ADL. No SOB however saturation decreased to 81% after standing/ADL activity. Increased to 90% once seated. O2 placed back onto pt. Increased to 96%. Marked fatigue and recommend sponge bathe upon return to home. Education/treatment:  ADL retraining with facilitation of movement and instruction of compensatory strategies for self care. Therapeutic activity to address balance, strength, and endurance for ADL and transfers. Pt education of walker safety, transfer safety, and home safety. · Pt has made  progress towards set goals.    · Continue with current plan of care        Treatment Charges: Mins Units    ADL/Home Mgt 91302 27 2    Thera Activities 34346 14 1    Ther Ex 51269      Manual Therapy 95799      Neuro Re-ed 50672      Orthotic manage/training  92393      Non Billable Time      Total Timed Treatment 41 [address]806 [facility] Irineo White SHANIA/L [id number]

## 2020-07-09 NOTE — PROGRESS NOTES
Physical Therapy  Facility/Department: [address] ORTHO SURGERY  Daily Treatment Note  NAME: Dave Vigil  : 1940  MRN: [id number]    Date of Service: 2020          Patient Diagnosis(es): The primary encounter diagnosis was Osteoarthritis of right patellofemoral joint. Diagnoses of Preop testing and Failed total right knee replacement, subsequent encounter were also pertinent to this visit. has a past medical history of Acid reflux disease, Anxiety, Arthritis, Central retinal vein occlusion of right eye, Glaucoma, left eye, History of fracture of femur, History of trigeminal neuralgia, Hyperlipidemia, Hypoglycemia, Right knee pain, Symptoms consistent with irritable bowel syndrome, Use of cane as ambulatory aid, and Wears glasses. has a past surgical history that includes Tonsillectomy; Breast lumpectomy; Cataract removal with implant (Right, ); other surgical history; joint replacement; Total knee arthroplasty (Bilateral, ); Cholecystectomy (); Breast biopsy (Bilateral,  right ,  left); and Revision total knee arthroplasty (Right, 2020). Evaluating Therapist: Umair Cortes, PT      rec ww      Referring James Huitron MD     Room #: 726   [address] [address] s/p R TKA revision  2020  PRECAUTIONS: falls, FWBAT      Social:  Pt lives with spouse  in a  2  floor plan  With first floor set up,  steps and 1 rails to enter. Prior to admission pt walked with cane        Initial Evaluation  Date:  2020 Treatment     2020 PM  Short Term/ Long Term   Goals   Was pt agreeable to Eval/treatment?  yes   yes      Does pt have pain?  4/10 R knee   R knee      Bed Mobility  Rolling:  NT   Supine to sit:  Min assist   Sit to supine:  NT   Scooting: min assist   Sit to supine : NT  Pt in chair upon arrival    SBA    Transfers Sit to stand: min assist   Stand to sit: min assist   Stand pivot:  NT   Sit <> stand : SBA  SBA    Ambulation    20  feet with  ww  with  Min assist  90 feet x 2 and 15 feet x 2  with ww SBA  200 feet with  ww  with SBA          Stair negotiation: ascended and descended NT      4 steps x 2  with 1 HR and cane CGA   4-12  steps with  1 rail with  AAD, SBA    LE ROM  AAROM R knee 5-90 degrees        LE strength  4-/ 5 R knee in available range        AM- PAC RAW score   16/ 24   18/ 24                Pt is alert and Oriented x  3       Balance:  SBA , fall risk      Endurance: decreased   Bed/Chair alarm:  no     ASSESSMENT     Pt displays functional ability as noted in the objective portion of this treatment           Treatment/Education:     Mobility as above. Pt required cues for safe technique with mobility. Pt in chair upon arrival . On RA  Pulse ox 88%. On RA after bouts of gait and stair negotiation, pulse decreased to 67-72%. Pt recovers quickly to 90-96% with rest and proper breathing technique. Pt reports light headeness at times with mobility. RN informed  Pt with reciprocal gait. Verbally instructed in car transfer technique. All home going concerns addressed      Pt educated on fall risk,  LE exercises, hand placement with transfers, ww safety, safety with mobility, proper breathing technique          Patient response to education:   Pt verbalized understanding Pt demonstrated skill Pt requires further education in this area    x With cues   x         Comments:  Pt left  In chair  after session, with call light in reach.              PLAN     PT care will be provided in accordance with the objectives noted above. Nannette Collet appropriate, clear delegation orders will be provided for nursing staff.  Exercises and functional mobility practice will be used as well as appropriate assistive devices or modalities to obtain goals. Patient and family education will also be administered as needed.     Frequency of treatments will be BID x 3 days.     Con't with PT POC.  Prepare for discharge      Time in:  1307  Time RSN:  3469           CPT codes: []?? Low Complexity PT evaluation [id number]  []? ? Moderate Complexity PT evaluation [id number]  []? Janus Hero Complexity PT evaluation [id number]  []?? PT Re-evaluation [id number]  []? ? Gait training 37235  minutes  [x]? ? Therapeutic activities  25  minutes  []? ? Therapeutic exercises 69983  minutes  []? ? Neuromuscular reeducation [id number]  minutes         Jennifer 18 number:  PT [id number]

## 2020-07-09 NOTE — PROGRESS NOTES
[facility] Progress Note    Admitting Date and Time: 7/8/2020  6:37 AM  Admit Dx: Failed total right knee replacement, subsequent encounter [T84.012D]  Failed total right knee replacement, subsequent encounter [T84.012D]    Subjective:  Patient is being followed for Failed total right knee replacement, subsequent encounter [T84.012D]  Failed total right knee replacement, subsequent encounter [T84.012D]   Pt feels ok, pain is controlled, worked with PT/OT and did well    ROS: denies fever, chills, cp, sob, n/v, HA unless stated above.       sodium chloride flush  10 mL Intravenous 2 times per day    acetaminophen  1,000 mg Oral 3 times per day    sennosides-docusate sodium  1 tablet Oral BID    aspirin  325 mg Oral Daily    gabapentin  300 mg Oral Nightly    calcium-vitamin D  1 tablet Oral BID    fluticasone  2 spray Nasal Daily    latanoprost  1 drop Both Eyes Nightly    atorvastatin  20 mg Oral Daily    pantoprazole  40 mg Oral QAM AC    PARoxetine  20 mg Oral QAM     sodium chloride flush, 10 mL, PRN  magnesium hydroxide, 30 mL, Daily PRN  oxyCODONE, 5 mg, Q4H PRN    Or  oxyCODONE, 10 mg, Q4H PRN  HYDROmorphone, 0.5 mg, Q3H PRN  promethazine, 12.5 mg, Q6H PRN    Or  ondansetron, 4 mg, Q6H PRN         Objective:    /65   Pulse 72   Temp 97.1 °F (36.2 °C) (Oral)   Resp 16   Ht 5' 4\" (1.626 m)   Wt 192 lb (87.1 kg)   SpO2 99%   BMI 32.96 kg/m²     General Appearance: alert and oriented to person, place and time and in no acute distress  Skin: warm and dry  Head: normocephalic and atraumatic  Eyes: pupils equal, round, and reactive to light, extraocular eye movements intact, conjunctivae normal  Neck: neck supple and non tender without mass   Pulmonary/Chest: clear to auscultation bilaterally- no wheezes, rales or rhonchi, normal air movement, no respiratory distress  Cardiovascular: normal rate, normal S1 and S2 and no carotid bruits  Abdomen: soft, non-tender, non-distended, normal bowel sounds, no masses or organomegaly  Extremities: no cyanosis, no clubbing and right knee in dressing  Neurologic: no cranial nerve deficit and speech normal        No results for input(s): NA, K, CL, CO2, BUN, CREATININE, GLUCOSE, CALCIUM in the last 72 hours. Recent Labs     07/09/20  0550   HGB 9.1*   HCT 28.9*         Assessment:    Principal Problem:    Failed total right knee replacement (HCC)  Active Problems:    Osteoarthritis of right patellofemoral joint  Resolved Problems:    * No resolved hospital problems. *      Plan:  1. Right knee failed total knee arthroplasty with osteoarthritis, status post revision of right total knee arthroplasty, postop day 1, postop care with incentive spirometry PT OT DVT prophylaxis and pain management. 2.  History of GERD, continue PPI. 3.  History of hyperlipidemia, continue on atorvastatin. NOTE: This report was transcribed using voice recognition software. Every effort was made to ensure accuracy; however, inadvertent computerized transcription errors may be present. Electronically signed by Summer Perez MD on 7/9/2020 at 12:06 PM       Updates, the patient was on oxygen for some reason, we ambulated the patient and she desaturated, I will obtain a CTA to rule out PE and d-dimer d-dimer was in the 2000, CTA was positive for bilateral scattered PEs, discussed with Ortho who agreed with anticoagulation, will start patient on Eliquis 10 mg twice daily for 7 days then switch to 5 mg twice daily.   Patient does not want to follow-up with her primary care physician, will discuss with social work

## 2020-07-09 NOTE — PROGRESS NOTES
CLINICAL PHARMACY NOTE: MEDS TO [address] [place] [address] Flazio Select Patient?: No  Total # of Prescriptions Filled: 1   The following medications were delivered to the patient:  · Oxycodone 5 mg  · otc tylenol and aspirin    Total # of Interventions Completed: 2  Time Spent (min): 30    Additional Documentation:

## 2020-07-09 NOTE — PROGRESS NOTES
Physical Therapy  Facility/Department: [address] ORTHO SURGERY  Daily Treatment Note  NAME: Wing Shell  : 1940  MRN: [id number]    Date of Service: 2020      Patient Diagnosis(es): The primary encounter diagnosis was Osteoarthritis of right patellofemoral joint. Diagnoses of Preop testing and Failed total right knee replacement, subsequent encounter were also pertinent to this visit. has a past medical history of Acid reflux disease, Anxiety, Arthritis, Central retinal vein occlusion of right eye, Glaucoma, left eye, History of fracture of femur, History of trigeminal neuralgia, Hyperlipidemia, Hypoglycemia, Right knee pain, Symptoms consistent with irritable bowel syndrome, Use of cane as ambulatory aid, and Wears glasses. has a past surgical history that includes Tonsillectomy; Breast lumpectomy; Cataract removal with implant (Right, ); other surgical history; joint replacement; Total knee arthroplasty (Bilateral, ); Cholecystectomy (); Breast biopsy (Bilateral,  right ,  left); and Revision total knee arthroplasty (Right, 2020). Evaluating Therapist: Candice Sequeira, PT      rec ww      Referring Provider:  Ajay Dunn MD     Room #: 909   DIAGNOSIS:  OA s/p R TKA revision  2020  PRECAUTIONS: falls, FWBAT      Social:  Pt lives with spouse  in a  2  floor plan  With first floor set up,  steps and 1 rails to enter. Prior to admission pt walked with cane        Initial Evaluation  Date:  2020 Treatment     2020 AM  Short Term/ Long Term   Goals   Was pt agreeable to Eval/treatment? yes   yes      Does pt have pain?  4/10 R knee   R knee      Bed Mobility  Rolling:  NT   Supine to sit:  Min assist   Sit to supine:  NT   Scooting: min assist   Sit to supine : SBA   SBA    Transfers Sit to stand: min assist   Stand to sit: min assist   Stand pivot:  NT   Sit <> stand : SBA/CGA   SBA    Ambulation    20  feet with  ww  with  Min assist  90 feet x 2 and 15 feet x 1 with ww SBA  200 feet with  ww  with SBA          Stair negotiation: ascended and descended NT   4 steps with B HRs SBA/CGA. 4 step with 1 HR and cane CGA/min assist   4-12  steps with  1 rail with  AAD, SBA    LE ROM  AAROM R knee 5-90 degrees        LE strength  4-/ 5 R knee in available range        AM- PAC RAW score   16/ 24   18/ 24                Pt is alert and Oriented x  3       Balance:  Min assist, fall risk      Endurance: decreased   Bed/Chair alarm:  no     ASSESSMENT    Pt displays functional ability as noted in the objective portion of this treatment           Treatment/Education:     Mobility as above. Pt required cues for safe technique with mobility. Pt in chair upon arrival . On RA  Pulse ox 86%. O2 applied at 2 LNC  - 90%, increased to 3 LNC - 97%. After gait with O2 @3 LNC 98%. Decreased O2 to 1 LNC and pulse ox remained 98%, but after mobility decreased to 89%. Increased O2 to 2 LNC and pulse ox recovered to 96%, was 92% after mobility . Pt with reciprocal gait. Mild posterior lean descending steps with cane use   Verbally instructed in car transfer technique     Pt educated on fall risk,  LE exercises, hand placement with transfers, ww safety, safety with mobility         Patient response to education:   Pt verbalized understanding Pt demonstrated skill Pt requires further education in this area    x With cues   x         Comments:  Pt left  In bed  after session, with call light in reach.              PLAN    PT care will be provided in accordance with the objectives noted above. Whenever appropriate, clear delegation orders will be provided for nursing staff. Exercises and functional mobility practice will be used as well as appropriate assistive devices or modalities to obtain goals. Patient and family education will also be administered as needed.     Frequency of treatments will be BID x 3 days. Con't with PT POC.  Prepare for discharge      Time in:  1039    Time out: 1120        Evaluation Time includes thorough review of current medical information, gathering information on past medical history/social history and prior level of function, completion of standardized testing/informal observation of tasks, assessment of data and education on plan of care and goals.     CPT codes:  []? Low Complexity PT evaluation [id number]  []? Moderate Complexity PT evaluation 01332  []? High Complexity PT evaluation [id number]  []? PT Re-evaluation [id number]  []? Gait training 02096  minutes  [x]? Therapeutic activities 83060 38  minutes  []? Therapeutic exercises 86143  minutes  []?  Neuromuscular reeducation 25806  minutes         Jennifer 18 number:  PT [id number]

## 2020-07-09 NOTE — PROGRESS NOTES
[facility] Quality Flow/Interdisciplinary Rounds Progress Note        Quality Flow Rounds held on July 9, 2020    Disciplines Attending:  Bedside Nurse, ,  and Nursing Unit Leadership    Rubio Haile was admitted on 7/8/2020  6:37 AM    Anticipated Discharge Date:  Expected Discharge Date: 07/10/20    Disposition:    Deniz Score:  Deniz Scale Score: 20    Readmission Risk              Risk of Unplanned Readmission:        6           Discussed patient goal for the day, patient clinical progression, and barriers to discharge.   The following Goal(s) of the Day/Commitment(s) have been identified:  Discharge Planning      Nela Saeed  July 9, 2020

## 2020-07-09 NOTE — PROGRESS NOTES
[facility] Quality Flow/Interdisciplinary Rounds Progress Note        Quality Flow Rounds held on July 9, 2020    Disciplines Attending:  Bedside Nurse, ,  and Nursing Unit Leadership    Zach Chiang was admitted on 7/8/2020  6:37 AM    Anticipated Discharge Date:  Expected Discharge Date: 07/10/20    Disposition:    Deniz Score:  Deniz Scale Score: 20    Readmission Risk              Risk of Unplanned Readmission:        6           Discussed patient goal for the day, patient clinical progression, and barriers to discharge.   The following Goal(s) of the Day/Commitment(s) have been identified:  Pain Control      Melissa Arredondo  July 9, 2020

## 2020-07-09 NOTE — PLAN OF CARE
Problem: Falls - Risk of:  Goal: Will remain free from falls  Description: Will remain free from falls  7/9/2020 0136 by Jayne Davies RN  Outcome: Met This Shift     Problem: Falls - Risk of:  Goal: Absence of physical injury  Description: Absence of physical injury  Outcome: Met This Shift     Problem: Pain - Acute:  Goal: Pain level will decrease  Description: Pain level will decrease  Outcome: Met This Shift

## 2020-07-09 NOTE — CARE COORDINATION
Updated discharge plan of care. Pt is NOT to d/c today.  o2 sat dropped and ordered CT of chest. Will re evaluate in am for possible o2 at home-AYLIN

## 2020-07-10 VITALS
BODY MASS INDEX: 32.78 KG/M2 | OXYGEN SATURATION: 96 % | TEMPERATURE: 98 F | WEIGHT: 192 LBS | HEIGHT: 64 IN | HEART RATE: 82 BPM | RESPIRATION RATE: 16 BRPM | DIASTOLIC BLOOD PRESSURE: 68 MMHG | SYSTOLIC BLOOD PRESSURE: 145 MMHG

## 2020-07-10 LAB
HCT VFR BLD CALC: 25 % (ref 34–48)
HEMOGLOBIN: 7.9 G/DL (ref 11.5–15.5)

## 2020-07-10 PROCEDURE — 6370000000 HC RX 637 (ALT 250 FOR IP): Performed by: INTERNAL MEDICINE

## 2020-07-10 PROCEDURE — 99233 SBSQ HOSP IP/OBS HIGH 50: CPT | Performed by: INTERNAL MEDICINE

## 2020-07-10 PROCEDURE — 97535 SELF CARE MNGMENT TRAINING: CPT

## 2020-07-10 PROCEDURE — 36415 COLL VENOUS BLD VENIPUNCTURE: CPT

## 2020-07-10 PROCEDURE — 2580000003 HC RX 258: Performed by: ORTHOPAEDIC SURGERY

## 2020-07-10 PROCEDURE — 97110 THERAPEUTIC EXERCISES: CPT

## 2020-07-10 PROCEDURE — 2700000000 HC OXYGEN THERAPY PER DAY

## 2020-07-10 PROCEDURE — 6370000000 HC RX 637 (ALT 250 FOR IP): Performed by: ORTHOPAEDIC SURGERY

## 2020-07-10 PROCEDURE — 85018 HEMOGLOBIN: CPT

## 2020-07-10 PROCEDURE — 97530 THERAPEUTIC ACTIVITIES: CPT

## 2020-07-10 PROCEDURE — 85014 HEMATOCRIT: CPT

## 2020-07-10 RX ADMIN — FLUTICASONE PROPIONATE 2 SPRAY: 50 SPRAY, METERED NASAL at 09:30

## 2020-07-10 RX ADMIN — CALCIUM CARBONATE 500 MG: 500 TABLET, CHEWABLE ORAL at 10:05

## 2020-07-10 RX ADMIN — ATORVASTATIN CALCIUM 20 MG: 20 TABLET, FILM COATED ORAL at 09:31

## 2020-07-10 RX ADMIN — OXYCODONE HYDROCHLORIDE 10 MG: 5 TABLET ORAL at 01:29

## 2020-07-10 RX ADMIN — PAROXETINE HYDROCHLORIDE 20 MG: 20 TABLET, FILM COATED ORAL at 09:32

## 2020-07-10 RX ADMIN — OXYCODONE HYDROCHLORIDE 10 MG: 5 TABLET ORAL at 11:58

## 2020-07-10 RX ADMIN — ACETAMINOPHEN 1000 MG: 500 TABLET ORAL at 09:32

## 2020-07-10 RX ADMIN — ASPIRIN 325 MG: 325 TABLET, DELAYED RELEASE ORAL at 09:30

## 2020-07-10 RX ADMIN — OYSTER SHELL CALCIUM WITH VITAMIN D 1 TABLET: 500; 200 TABLET, FILM COATED ORAL at 09:31

## 2020-07-10 RX ADMIN — SODIUM CHLORIDE, PRESERVATIVE FREE 10 ML: 5 INJECTION INTRAVENOUS at 09:34

## 2020-07-10 RX ADMIN — ACETAMINOPHEN 1000 MG: 500 TABLET ORAL at 01:28

## 2020-07-10 RX ADMIN — PANTOPRAZOLE SODIUM 40 MG: 40 TABLET, DELAYED RELEASE ORAL at 05:49

## 2020-07-10 RX ADMIN — APIXABAN 10 MG: 5 TABLET, FILM COATED ORAL at 09:30

## 2020-07-10 RX ADMIN — DOCUSATE SODIUM 50 MG AND SENNOSIDES 8.6 MG 1 TABLET: 8.6; 5 TABLET, FILM COATED ORAL at 09:31

## 2020-07-10 ASSESSMENT — PAIN SCALES - GENERAL
PAINLEVEL_OUTOF10: 6
PAINLEVEL_OUTOF10: 7
PAINLEVEL_OUTOF10: 0
PAINLEVEL_OUTOF10: 5
PAINLEVEL_OUTOF10: 3

## 2020-07-10 ASSESSMENT — PAIN DESCRIPTION - PROGRESSION
CLINICAL_PROGRESSION: GRADUALLY WORSENING

## 2020-07-10 ASSESSMENT — PAIN DESCRIPTION - PAIN TYPE
TYPE: SURGICAL PAIN
TYPE: ACUTE PAIN;SURGICAL PAIN
TYPE: SURGICAL PAIN
TYPE: SURGICAL PAIN

## 2020-07-10 ASSESSMENT — PAIN DESCRIPTION - ORIENTATION
ORIENTATION: RIGHT

## 2020-07-10 ASSESSMENT — PAIN DESCRIPTION - ONSET
ONSET: GRADUAL
ONSET: ON-GOING

## 2020-07-10 ASSESSMENT — PAIN DESCRIPTION - LOCATION
LOCATION: KNEE

## 2020-07-10 ASSESSMENT — PAIN DESCRIPTION - FREQUENCY
FREQUENCY: CONTINUOUS

## 2020-07-10 ASSESSMENT — PAIN DESCRIPTION - DESCRIPTORS
DESCRIPTORS: ACHING;DISCOMFORT;DULL

## 2020-07-10 ASSESSMENT — PAIN - FUNCTIONAL ASSESSMENT
PAIN_FUNCTIONAL_ASSESSMENT: PREVENTS OR INTERFERES SOME ACTIVE ACTIVITIES AND ADLS
PAIN_FUNCTIONAL_ASSESSMENT: ACTIVITIES ARE NOT PREVENTED
PAIN_FUNCTIONAL_ASSESSMENT: PREVENTS OR INTERFERES SOME ACTIVE ACTIVITIES AND ADLS

## 2020-07-10 NOTE — PROGRESS NOTES
Physical Therapy  Facility/Department: [address] ORTHO SURGERY  Daily Treatment Note  NAME: Austyn Espitia  : 1940  MRN: [id number]    Date of Service: 7/10/2020          Patient Diagnosis(es): The primary encounter diagnosis was Osteoarthritis of right patellofemoral joint. Diagnoses of Preop testing and Failed total right knee replacement, subsequent encounter were also pertinent to this visit. has a past medical history of Acid reflux disease, Anxiety, Arthritis, Central retinal vein occlusion of right eye, Glaucoma, left eye, History of fracture of femur, History of trigeminal neuralgia, Hyperlipidemia, Hypoglycemia, Right knee pain, Symptoms consistent with irritable bowel syndrome, Use of cane as ambulatory aid, and Wears glasses. has a past surgical history that includes Tonsillectomy; Breast lumpectomy; Cataract removal with implant (Right, ); other surgical history; joint replacement; Total knee arthroplasty (Bilateral, ); Cholecystectomy (); Breast biopsy (Bilateral,  right ,  left); and Revision total knee arthroplasty (Right, 2020). Evaluating Therapist: Claudell Junes, PT      rec ww      Referring Kendall Gan MD     Room #: 857   [address] [place] [address] s/p R TKA revision  2020  PRECAUTIONS: falls, FWBAT      Social:  Pt lives with spouse  in a  2  floor plan  With first floor set up,  steps and 1 rails to enter. Prior to admission pt walked with cane        Initial Evaluation  Date:  2020 Treatment     7/10/2020 PM Short Term/ Long Term   Goals   Was pt agreeable to Eval/treatment?  yes   yes      Does pt have pain?  4/10 R knee    R knee stiffness     Bed Mobility  Rolling:  NT   Supine to sit:  Min assist   Sit to supine:  NT   Scooting: min assist  Sit to supine : NT  Sit to supine: Min A R LE support      SBA    Transfers Sit to stand: min assist   Stand to sit: min assist   Stand pivot:  NT   Sit <> stand : SBA  SBA    Ambulation    20  feet with  ww  with  Min assist  150 feet and 90 feet x 1 each using [employer] for support SBA for balance  200 feet with  ww  with SBA          Stair negotiation: ascended and descended NT      4 steps x 2  with 1 HR and cane SBA, step to pattern used   4-12  steps with  1 rail with  AAD, SBA    LE ROM  AAROM R knee 5-90 degrees        LE strength  4-/ 5 R knee in available range        AM- PAC RAW score   16/ 24 18/ 24            Balance: fair dynamic using WW for support    Pt performed therapeutic exercise of the following as per written HEP: supine B ankle pumps, quad/glut sets AROM, R LE heel slides AAROM x 20 : R LE SLR x 10 AAROM. Verbally reviewed AM exercise     Patient education  Pt  was educated on HEP, transfers, gait, stair negotiation, car transfer, bed mobility    Patient response to education:   Pt verbalized understanding Pt demonstrated skill Pt requires further education in this area   yes With  instruction yes     ASSESSMENT:   Comments: Pt found in a bedside chair, gait performed to the hallway. Anitha slow and consistent, step through reciprocal pattern achieved, noted brief initial step to pattern due to knee stiffness. No loss of balance noted, Pt at times short of breath, 02 saturation 98% on 3 L 02 NC. Stairs performed with minimal difficulty. Pt assisted back to bed, exercise performed. Pt states has no further questions about home safety.   Treatment: Pt practiced and was instructed in the following treatment: HEP promoting circulation, ROM and strengthening, UE usage to assist with transfers, gait promoting posture and staying within [employer] base of support, stair negotiation promoting sequence and cane placement, car transfer to back into the front passenger seat, bed mobility promoting proper assist as needed    Pt was left in bed per request with call light in reach    Time in 1318   Time out 1357  Total Treatment Time 39 minutes   CPT codes:     Therapeutic activities 51409 16 minutes   Therapeutic exercises 16754 23 minutes       Pt is making good progress toward established Physical Therapy goals as per functional mobility performed and exercise participation. Pt displays functional mobility needed to go home with family assistance  Continue with physical therapy current plan of care.     Ban Hernandez PTA   License Number: [id number]

## 2020-07-10 NOTE — PROGRESS NOTES
[facility] Progress Note    Admitting Date and Time: 7/8/2020  6:37 AM  Admit Dx: Failed total right knee replacement, subsequent encounter [T84.012D]  Failed total right knee replacement, subsequent encounter [T84.012D]    Subjective:  Patient is being followed for Failed total right knee replacement, subsequent encounter [T84.012D]  Failed total right knee replacement, subsequent encounter [T84.012D]   Pt feels ok, pain is controlled, worked with PT/OT and did well, had hypoxia yesterday. And was mildly SOB ambulating    ROS: denies fever, chills, cp, n/v, HA unless stated above.       apixaban  10 mg Oral BID    sodium chloride flush  10 mL Intravenous 2 times per day    acetaminophen  1,000 mg Oral 3 times per day    sennosides-docusate sodium  1 tablet Oral BID    aspirin  325 mg Oral Daily    gabapentin  300 mg Oral Nightly    calcium-vitamin D  1 tablet Oral BID    fluticasone  2 spray Nasal Daily    latanoprost  1 drop Both Eyes Nightly    atorvastatin  20 mg Oral Daily    pantoprazole  40 mg Oral QAM AC    PARoxetine  20 mg Oral QAM     sodium chloride flush, 10 mL, PRN  calcium carbonate, 500 mg, TID PRN  sodium chloride flush, 10 mL, PRN  magnesium hydroxide, 30 mL, Daily PRN  oxyCODONE, 5 mg, Q4H PRN    Or  oxyCODONE, 10 mg, Q4H PRN  HYDROmorphone, 0.5 mg, Q3H PRN  promethazine, 12.5 mg, Q6H PRN    Or  ondansetron, 4 mg, Q6H PRN         Objective:    BP (!) 137/59   Pulse 85   Temp 98.2 °F (36.8 °C) (Oral)   Resp 16   Ht 5' 4\" (1.626 m)   Wt 192 lb (87.1 kg)   SpO2 96%   BMI 32.96 kg/m²     General Appearance: alert and oriented to person, place and time and in no acute distress  Skin: warm and dry  Head: normocephalic and atraumatic  Eyes: pupils equal, round, and reactive to light, extraocular eye movements intact, conjunctivae normal  Neck: neck supple and non tender without mass   Pulmonary/Chest: clear to auscultation bilaterally- no wheezes, rales or rhonchi, normal air movement, no respiratory distress  Cardiovascular: normal rate, normal S1 and S2 and no carotid bruits  Abdomen: soft, non-tender, non-distended, normal bowel sounds, no masses or organomegaly  Extremities: no cyanosis, no clubbing and right knee in dressing  Neurologic: no cranial nerve deficit and speech normal        No results for input(s): NA, K, CL, CO2, BUN, CREATININE, GLUCOSE, CALCIUM in the last 72 hours. Recent Labs     07/09/20  0550 07/10/20  0550   HGB 9.1* 7.9*   HCT 28.9* 25.0*         Assessment:    Principal Problem:    Failed total right knee replacement (HCC)  Active Problems:    Osteoarthritis of right patellofemoral joint  Resolved Problems:    * No resolved hospital problems. *      Plan:  1. Right knee failed total knee arthroplasty with osteoarthritis, status post revision of right total knee arthroplasty, postop day 1, postop care with incentive spirometry PT OT DVT prophylaxis and pain management. 2.  History of GERD, continue PPI. 3.  History of hyperlipidemia, continue on atorvastatin. 4.  Acute respiratory failure with hypoxia due to PE,  Will need O2 on discharge  5. Acute PE, started the patient on Eliquis  Ok to discharge from medical stand point. Patient will need a new PCP    NOTE: This report was transcribed using voice recognition software. Every effort was made to ensure accuracy; however, inadvertent computerized transcription errors may be present.   Electronically signed by Lidia Sheridan MD on 7/10/2020 at 10:27 AM

## 2020-07-10 NOTE — PLAN OF CARE
Problem: Falls - Risk of:  Goal: Will remain free from falls  Description: Will remain free from falls  Outcome: Met This Shift     Problem: Falls - Risk of:  Goal: Absence of physical injury  Description: Absence of physical injury  Outcome: Met This Shift     Problem: Mobility - Impaired:  Goal: Mobility will improve  Description: Mobility will improve  Outcome: Met This Shift     Problem: Pain - Acute:  Goal: Pain level will decrease  Description: Pain level will decrease  Outcome: Met This Shift     Problem: Pain:  Goal: Pain level will decrease  Description: Pain level will decrease  Outcome: Met This Shift

## 2020-07-10 NOTE — CARE COORDINATION
Updated discharge plan of care. Pt will need home o2. Referral called to [address] [place] [address] [address] from [facility]. Pt will also need Eliquis care-given.  Plan home when stable-MJo

## 2020-07-10 NOTE — PROGRESS NOTES
Occupational Therapy  OT BEDSIDE TREATMENT NOTE      Date:7/10/2020  Patient Name: Shamika Oscar  MRN: [id number]  : 1940  Room: [address] [place], [place] [address]     Referring Provider:  José Luis Mcgraw MD     Evaluating OT: Eloisa Esqueda OTR/L [id number]     AM-PAC Daily Activity Raw Score: 18      Recommended Adaptive Equipment: Shower chair       Diagnosis: Failed  R knee prosthesis. S/p R TKA 2020      Pertinent Medical History: arthritis   Precautions:  Falls, FWB R LE      Home Living: Pt lives with , 2 story with bed/bath on 1st.  3 steps/rail to enter. Walk in shower    Prior Level of Function: Independent  with ADLs , assist as needed with IADLs; ambulated cane   Driving: yes      Pain Level: Pt reports mild pain in knee. Cognition:  Alert and oriented. Min cues for safety. Functional Assessment:    Initial Eval Status  Date: 20 Tx Session  STGs = LTGs  1-2 weeks    Feeding Independent        Grooming Set-up ,seated   Mod I    UB Dressing Set-up  Setup   Mod I    LB Dressing Max A   pt encouraged to maximize movement for LB ADL.  present and pt insistent that  assist her to take off socks and don shoes. Mod I    Bathing Mod A    Mod I    Toileting Assist with thorough hygiene    Independent    Bed Mobility  Min A  Supine to sit         Functional Transfers Min A  Sit-stand from bed      Patient reports she has hx of passing out after past surgeries   Upon sitting EOB - patient did report she was seeing \"floaters\",   O2 sats on room dropped to 76%- O2 applied to 4 L- improved to 94% with time and encouraging deep breathing tech. Floaters subsided, no complaints of SOB   (Nurse informed)  SBA and cues for hand placement   Mod I    Functional Mobility Min A,w/walker, O2 on  Short distance in room      Patient stated she felt good to sit in chair.  present in room  SBA using w/w. Cues for O2 line management during mobility. Fair carry over noted.    Mod I with good tolerance    Balance Sitting:     Static:  SBA    Standing: Min A       Activity Tolerance Fair with light activity  Fair +   Good  with ADL activity       Comments:  O2 monitored during session. Remained 96% during activity while on 2L. Walk in shower transfer completed. Pt demonstrates ability to step over shower surface however her set up at home is different.  and pt very detailed and unable to simulate exact setup as in her home setting. Pt was able to step over shower surface with CGA. Recommended pt complete transfer with home health therapy to assess safety in her home setting. Recommended use of shower chair.  states he can borrow one for pt to use. Education/treatment:  ADL retraining with facilitation of movement and instruction of compensatory strategies for self care. Therapeutic activity to address balance, strength, and endurance for ADL and transfers. Pt education of walker safety, transfer safety, O2 line management, and home safety. · Pt has made  progress towards set goals.    · Continue with current plan of care        Treatment Charges: Mins Units    ADL/Home Mgt 71770 20 1    Thera Activities 08268 11 1    Ther Ex 82732      Manual Therapy 75248      Neuro Re-ed 48540      Orthotic manage/training  [id number]      Non Billable Time      Total Timed Treatment 31 300 [facility] SHANIA/L 17537

## 2020-07-10 NOTE — PLAN OF CARE
Problem: Falls - Risk of:  Goal: Will remain free from falls  Description: Will remain free from falls  Outcome: Met This Shift     Problem: Falls - Risk of:  Goal: Absence of physical injury  Description: Absence of physical injury  Outcome: Met This Shift     Problem: Mobility - Impaired:  Goal: Mobility will improve  Description: Mobility will improve  Outcome: Met This Shift

## 2020-07-10 NOTE — PROGRESS NOTES
Physical Therapy  Facility/Department: [address] ORTHO SURGERY  Daily Treatment Note  NAME: Alber Olmedo  : 1940  MRN: [id number]    Date of Service: 7/10/2020          Patient Diagnosis(es): The primary encounter diagnosis was Osteoarthritis of right patellofemoral joint. Diagnoses of Preop testing and Failed total right knee replacement, subsequent encounter were also pertinent to this visit. has a past medical history of Acid reflux disease, Anxiety, Arthritis, Central retinal vein occlusion of right eye, Glaucoma, left eye, History of fracture of femur, History of trigeminal neuralgia, Hyperlipidemia, Hypoglycemia, Right knee pain, Symptoms consistent with irritable bowel syndrome, Use of cane as ambulatory aid, and Wears glasses. has a past surgical history that includes Tonsillectomy; Breast lumpectomy; Cataract removal with implant (Right, ); other surgical history; joint replacement; Total knee arthroplasty (Bilateral, ); Cholecystectomy (); Breast biopsy (Bilateral,  right ,  left); and Revision total knee arthroplasty (Right, 2020). Evaluating Therapist: Naeem Browne, PT      rec ww      Referring Manisha Amaya MD     Room #: 385   [address] [place] [address] s/p R TKA revision  2020  PRECAUTIONS: falls, FWBAT      Social:  Pt lives with spouse  in a  2  floor plan  With first floor set up,  steps and 1 rails to enter. Prior to admission pt walked with cane        Initial Evaluation  Date:  2020 Treatment     7/10/2020  Short Term/ Long Term   Goals   Was pt agreeable to Eval/treatment?  yes   yes      Does pt have pain?  4/10 R knee   5 of 10 R knee      Bed Mobility  Rolling:  NT   Supine to sit:  Min assist   Sit to supine:  NT   Scooting: min assist   Sit to supine : NT  Pt in chair upon arrival    SBA    Transfers Sit to stand: min assist   Stand to sit: min assist   Stand pivot:  NT   Sit <> stand : SBA  SBA    Ambulation    20  feet with  ww  with  Min assist  90 feet x 2 using [employer] for support SBA for balance  200 feet with  ww  with SBA          Stair negotiation: ascended and descended NT      4 steps x 2  with 1 HR and cane SBA, step to pattern used   4-12  steps with  1 rail with  AAD, SBA    LE ROM  AAROM R knee 5-90 degrees        LE strength  4-/ 5 R knee in available range        AM- PAC RAW score   16/ 24 18/ 24            Balance: fair dynamic using WW for support    Pt performed therapeutic exercise of the following: seated B ankle pumps, R LE quad sets AROM x 20, towel slides AROM x 40: R LE LAQ's x 20 A/AAROM     Patient education  Pt and  was educated on exercise, transfers and gait    Patient response to education:   Pt verbalized understanding Pt demonstrated skill Pt requires further education in this area   yes With  instruction yes     ASSESSMENT:   Comments: Pt found in a bedside chair, exercise performed. Nurse then present, 02 saturation monitored with and without 02, Nurse recorded readings. Anitha slow and consistent, step through reciprocal pattern achieved. No loss of balance noted, Pt at times short of breath. Stairs performed with minimal difficulty,  states Pt did much better with stairs today. Treatment: Pt practiced and was instructed in the following treatment: exercise promoting circulation, ROM and strengthening, UE usage to assist with transfers, gait promoting posture and staying within [employer] base of support, stair negotiation promoting sequence and cane placement    Pt was left in a bedside chair as found with call light in reach    Time in 0948   Time out 1026   Total Treatment Time 34 minutes   CPT codes:     Therapeutic activities 35433 20 minutes   Therapeutic exercises 10024 14 minutes       Pt is making good progress toward established Physical Therapy goals as per functional mobility performed and exercise participation. Continue with physical therapy current plan of care.     Deonte Lira PTA License Number: [id number]

## 2020-07-10 NOTE — PROGRESS NOTES
[facility] Quality Flow/Interdisciplinary Rounds Progress Note        Quality Flow Rounds held on July 10, 2020    Disciplines Attending:  Bedside Nurse, ,  and Nursing Unit Leadership    Alber Olmedo was admitted on 7/8/2020  6:37 AM    Anticipated Discharge Date:  Expected Discharge Date: 07/10/20    Disposition:    Deniz Score:  Deniz Scale Score: 20    Readmission Risk              Risk of Unplanned Readmission:        6           Discussed patient goal for the day, patient clinical progression, and barriers to discharge.   The following Goal(s) of the Day/Commitment(s) have been identified:  Discharge [address] [place]  July 10, 2020

## 2020-08-12 LAB — MAMMOGRAPHY, EXTERNAL: NORMAL

## 2020-08-31 PROBLEM — I26.94 MULTIPLE SUBSEGMENTAL PULMONARY EMBOLI WITHOUT ACUTE COR PULMONALE (HCC): Status: ACTIVE | Noted: 2020-08-31

## 2020-09-23 ENCOUNTER — HOSPITAL ENCOUNTER (OUTPATIENT)
Dept: INFUSION THERAPY | Age: 80
Setting detail: INFUSION SERIES
Discharge: HOME | End: 2020-09-23
Payer: MEDICARE

## 2020-09-23 DIAGNOSIS — I26.94 MULTIPLE SUBSEGMENTAL PULMONARY EMBOLI WITHOUT ACUTE COR PULMONALE (HCC): Primary | ICD-10-CM

## 2020-09-23 PROCEDURE — 96372 THER/PROPH/DIAG INJ SC/IM: CPT

## 2020-09-23 PROCEDURE — 6360000002 HC RX W HCPCS: Performed by: FAMILY MEDICINE

## 2020-09-23 RX ADMIN — ENOXAPARIN SODIUM 80 MG: 80 INJECTION SUBCUTANEOUS at 10:19

## 2020-10-22 ENCOUNTER — HOSPITAL ENCOUNTER (OUTPATIENT)
Dept: NON INVASIVE DIAGNOSTICS | Age: 80
Discharge: HOME | End: 2020-10-22
Payer: MEDICARE

## 2020-10-22 LAB
LV EF: 52 %
LVEF MODALITY: NORMAL

## 2020-10-22 PROCEDURE — 93306 TTE W/DOPPLER COMPLETE: CPT

## 2020-12-30 LAB — DIABETIC RETINOPATHY: NEGATIVE

## 2021-09-23 LAB
ALBUMIN SERPL-MCNC: NORMAL G/DL
ALP BLD-CCNC: NORMAL U/L
ALT SERPL-CCNC: NORMAL U/L
ANION GAP SERPL CALCULATED.3IONS-SCNC: NORMAL MMOL/L
AST SERPL-CCNC: NORMAL U/L
BILIRUB SERPL-MCNC: NORMAL MG/DL
BUN BLDV-MCNC: NORMAL MG/DL
CALCIUM SERPL-MCNC: NORMAL MG/DL
CHLORIDE BLD-SCNC: NORMAL MMOL/L
CHOLESTEROL, TOTAL: NORMAL
CHOLESTEROL/HDL RATIO: NORMAL
CO2: NORMAL
CREAT SERPL-MCNC: NORMAL MG/DL
GFR CALCULATED: NORMAL
GLUCOSE BLD-MCNC: NORMAL MG/DL
HDLC SERPL-MCNC: NORMAL MG/DL
LDL CHOLESTEROL CALCULATED: NORMAL
NONHDLC SERPL-MCNC: NORMAL MG/DL
POTASSIUM SERPL-SCNC: NORMAL MMOL/L
SODIUM BLD-SCNC: NORMAL MMOL/L
T4 FREE: NORMAL
TOTAL PROTEIN: NORMAL
TRIGL SERPL-MCNC: NORMAL MG/DL
TSH SERPL DL<=0.05 MIU/L-ACNC: 3.19 UIU/ML
VLDLC SERPL CALC-MCNC: NORMAL MG/DL

## 2022-01-08 LAB
ALBUMIN SERPL-MCNC: NORMAL G/DL
ALP BLD-CCNC: NORMAL U/L
ALT SERPL-CCNC: NORMAL U/L
ANION GAP SERPL CALCULATED.3IONS-SCNC: NORMAL MMOL/L
AST SERPL-CCNC: NORMAL U/L
BILIRUB SERPL-MCNC: NORMAL MG/DL
BUN BLDV-MCNC: NORMAL MG/DL
CALCIUM SERPL-MCNC: NORMAL MG/DL
CHLORIDE BLD-SCNC: NORMAL MMOL/L
CHOLESTEROL, TOTAL: NORMAL
CHOLESTEROL/HDL RATIO: NORMAL
CO2: NORMAL
CREAT SERPL-MCNC: NORMAL MG/DL
GFR CALCULATED: NORMAL
GLUCOSE BLD-MCNC: NORMAL MG/DL
HDLC SERPL-MCNC: NORMAL MG/DL
LDL CHOLESTEROL CALCULATED: NORMAL
NONHDLC SERPL-MCNC: NORMAL MG/DL
POTASSIUM SERPL-SCNC: NORMAL MMOL/L
SODIUM BLD-SCNC: NORMAL MMOL/L
T4 FREE: NORMAL
TOTAL PROTEIN: NORMAL
TRIGL SERPL-MCNC: NORMAL MG/DL
TSH SERPL DL<=0.05 MIU/L-ACNC: 3.24 UIU/ML
VLDLC SERPL CALC-MCNC: NORMAL MG/DL

## 2022-04-21 LAB
ALBUMIN SERPL-MCNC: NORMAL G/DL
ALP BLD-CCNC: NORMAL U/L
ALT SERPL-CCNC: NORMAL U/L
ANION GAP SERPL CALCULATED.3IONS-SCNC: NORMAL MMOL/L
AST SERPL-CCNC: NORMAL U/L
BILIRUB SERPL-MCNC: NORMAL MG/DL
BUN BLDV-MCNC: NORMAL MG/DL
CALCIUM SERPL-MCNC: NORMAL MG/DL
CHLORIDE BLD-SCNC: NORMAL MMOL/L
CHOLESTEROL, TOTAL: NORMAL
CHOLESTEROL/HDL RATIO: NORMAL
CO2: NORMAL
CREAT SERPL-MCNC: NORMAL MG/DL
GFR CALCULATED: NORMAL
GLUCOSE BLD-MCNC: NORMAL MG/DL
HDLC SERPL-MCNC: NORMAL MG/DL
LDL CHOLESTEROL CALCULATED: NORMAL
NONHDLC SERPL-MCNC: NORMAL MG/DL
POTASSIUM SERPL-SCNC: NORMAL MMOL/L
SODIUM BLD-SCNC: NORMAL MMOL/L
T4 FREE: NORMAL
TOTAL PROTEIN: NORMAL
TRIGL SERPL-MCNC: NORMAL MG/DL
TSH SERPL DL<=0.05 MIU/L-ACNC: 2.3 UIU/ML
TSH SERPL DL<=0.05 MIU/L-ACNC: 2.3 UIU/ML
VLDLC SERPL CALC-MCNC: NORMAL MG/DL

## 2022-07-17 ENCOUNTER — HOSPITAL ENCOUNTER (EMERGENCY)
Age: 82
Discharge: HOME | End: 2022-07-17
Attending: EMERGENCY MEDICINE
Payer: MEDICARE

## 2022-07-17 ENCOUNTER — APPOINTMENT (OUTPATIENT)
Dept: CT IMAGING | Age: 82
End: 2022-07-17
Payer: MEDICARE

## 2022-07-17 ENCOUNTER — APPOINTMENT (OUTPATIENT)
Dept: GENERAL RADIOLOGY | Age: 82
End: 2022-07-17
Payer: MEDICARE

## 2022-07-17 VITALS
OXYGEN SATURATION: 96 % | RESPIRATION RATE: 18 BRPM | HEART RATE: 79 BPM | WEIGHT: 200 LBS | DIASTOLIC BLOOD PRESSURE: 85 MMHG | TEMPERATURE: 98.4 F | BODY MASS INDEX: 33.32 KG/M2 | SYSTOLIC BLOOD PRESSURE: 153 MMHG | HEIGHT: 65 IN

## 2022-07-17 DIAGNOSIS — S09.90XA CLOSED HEAD INJURY, INITIAL ENCOUNTER: Primary | ICD-10-CM

## 2022-07-17 DIAGNOSIS — S50.02XA CONTUSION OF LEFT ELBOW, INITIAL ENCOUNTER: ICD-10-CM

## 2022-07-17 PROCEDURE — 73070 X-RAY EXAM OF ELBOW: CPT

## 2022-07-17 PROCEDURE — 72125 CT NECK SPINE W/O DYE: CPT

## 2022-07-17 PROCEDURE — 6360000002 HC RX W HCPCS: Performed by: EMERGENCY MEDICINE

## 2022-07-17 PROCEDURE — 70450 CT HEAD/BRAIN W/O DYE: CPT

## 2022-07-17 PROCEDURE — 90471 IMMUNIZATION ADMIN: CPT | Performed by: EMERGENCY MEDICINE

## 2022-07-17 PROCEDURE — 99284 EMERGENCY DEPT VISIT MOD MDM: CPT

## 2022-07-17 PROCEDURE — 90714 TD VACC NO PRESV 7 YRS+ IM: CPT | Performed by: EMERGENCY MEDICINE

## 2022-07-17 RX ORDER — TETANUS AND DIPHTHERIA TOXOIDS ADSORBED 2; 2 [LF]/.5ML; [LF]/.5ML
0.5 INJECTION INTRAMUSCULAR ONCE
Status: COMPLETED | OUTPATIENT
Start: 2022-07-17 | End: 2022-07-17

## 2022-07-17 RX ADMIN — TETANUS AND DIPHTHERIA TOXOIDS ADSORBED 0.5 ML: 2; 2 INJECTION INTRAMUSCULAR at 13:33

## 2022-07-17 ASSESSMENT — ENCOUNTER SYMPTOMS
ABDOMINAL PAIN: 0
VOMITING: 1
SHORTNESS OF BREATH: 0
NAUSEA: 1
BACK PAIN: 0
CHEST TIGHTNESS: 0

## 2022-07-17 ASSESSMENT — PAIN SCALES - GENERAL: PAINLEVEL_OUTOF10: 4

## 2022-07-17 ASSESSMENT — PAIN - FUNCTIONAL ASSESSMENT: PAIN_FUNCTIONAL_ASSESSMENT: 0-10

## 2022-07-17 NOTE — ED PROVIDER NOTES
IsPresents to the ED for evaluation. Patient was at [employer] playing the organ. When she stood up she states that she lost her balance and fell. She hit her left elbow on the bench and hit her head on the wooden bench as well. Denies loss of consciousness. Denies headache. She did have an episode of nausea and vomiting afterwards. Not currently nauseated. Patient is on Eliquis. She denies any focal weakness or numbness. Denies neck or back pain. There was no chest pain, shortness of breath, or dizziness preceding the fall. States that her tetanus is not up-to-date. Patient currently has no complaints. Review of Systems   Respiratory:  Negative for chest tightness and shortness of breath. Cardiovascular:  Positive for leg swelling (Chronic right lower extremity swelling since prior knee surgery. ). Negative for chest pain and palpitations. Gastrointestinal:  Positive for nausea (No longer nauseated) and vomiting. Negative for abdominal pain. Musculoskeletal:  Negative for back pain and neck pain. Skin:  Positive for wound. Negative for pallor. Neurological:  Negative for dizziness, syncope and light-headedness. Hematological:  Bruises/bleeds easily (On Eliquis). Physical Exam  Vitals and nursing note reviewed. Constitutional:       General: She is not in acute distress. Appearance: She is well-developed and normal weight. She is not ill-appearing or diaphoretic. HENT:      Head: Normocephalic. Comments: No craniofacial trauma noted  Eyes:      Extraocular Movements: Extraocular movements intact. Conjunctiva/sclera: Conjunctivae normal.   Cardiovascular:      Rate and Rhythm: Normal rate and regular rhythm. Heart sounds: Normal heart sounds. No murmur heard. Pulmonary:      Effort: Pulmonary effort is normal. No respiratory distress. Breath sounds: Normal breath sounds. No wheezing or rales. Chest:      Chest wall: No tenderness.    Abdominal: General: Bowel sounds are normal.      Palpations: Abdomen is soft. Tenderness: There is no abdominal tenderness. There is no guarding or rebound. Musculoskeletal:      Cervical back: Normal range of motion and neck supple. No tenderness (No midline cervical spine tenderness. ). Skin:     General: Skin is warm and dry. Coloration: Skin is not pale. Neurological:      Mental Status: She is alert and oriented to person, place, and time. Comments: Sensation grossly intact. No focal neurological deficits. No ataxia with finger-to-nose. Procedures     MDM     ED Course as of 07/17/22 1540   Sun Jul 17, 2022   1538 Resting in bed no distress. Has no complaints. Discussed results of imaging with her. CT of the head shows no acute intracranial abnormality. CT of the cervical spine shows no acute bony pathology. Imaging of the left elbow also shows no acute bony injury. Her tetanus was updated. She had a dressing placed on her abrasion on the left elbow after it was cleaned. She is can be discharged home and will follow up with her PCP. She understands if she has worsening symptoms or new concerns that she can return to the ED for further evaluation. [MS]      ED Course User Index  [MS] Re Butler, DO       --------------------------------------------- PAST HISTORY ---------------------------------------------  Past Medical History:  has a past medical history of Acid reflux disease, Anxiety, Arthritis, Central retinal vein occlusion of right eye, Glaucoma, left eye, History of fracture of femur, History of trigeminal neuralgia, Hyperlipidemia, Hypoglycemia, Right knee pain, Symptoms consistent with irritable bowel syndrome, Use of cane as ambulatory aid, and Wears glasses. Past Surgical History:  has a past surgical history that includes Tonsillectomy; Breast lumpectomy; Cataract removal with implant (Right, 2005); other surgical history; joint replacement;  Total knee arthroplasty (Bilateral, 2013); Cholecystectomy (2015); Breast biopsy (Bilateral, 2015 right , 2020 left); and Revision total knee arthroplasty (Right, 7/8/2020). Social History:  reports that she has never smoked. She has never used smokeless tobacco. She reports current alcohol use of about 7.0 standard drinks per week. She reports that she does not use drugs. Family History: family history is not on file. The patients home medications have been reviewed. Allergies: Adhesive tape and Augmentin [amoxicillin-pot clavulanate]    -------------------------------------------------- RESULTS -------------------------------------------------  Labs:  No results found for this visit on 07/17/22. Radiology:  CT HEAD WO CONTRAST   Final Result   No acute intracranial abnormality. CT Cervical Spine WO Contrast   Final Result   No acute abnormality of the cervical spine. XR ELBOW LEFT (2 VIEWS)   Final Result   No acute abnormality involving left elbow.             ------------------------- NURSING NOTES AND VITALS REVIEWED ---------------------------  Date / Time Roomed:  7/17/2022  1:10 PM  ED Bed Assignment:  02/02    The nursing notes within the ED encounter and vital signs as below have been reviewed. BP (!) 153/85   Pulse 79   Temp 98.4 °F (36.9 °C) (Oral)   Resp 18   Ht 5' 5\" (1.651 m)   Wt 200 lb (90.7 kg)   SpO2 96%   BMI 33.28 kg/m²   Oxygen Saturation Interpretation: Normal      ------------------------------------------ PROGRESS NOTES ------------------------------------------  I have spoken with the patient and discussed todays results, in addition to providing specific details for the plan of care and counseling regarding the diagnosis and prognosis. Their questions are answered at this time and they are agreeable with the plan. I discussed at length with them reasons for immediate return here for re evaluation.  They will followup with primary care by calling their office tomorrow. --------------------------------- ADDITIONAL PROVIDER NOTES ---------------------------------  At this time the patient is without objective evidence of an acute process requiring hospitalization or inpatient management. They have remained hemodynamically stable throughout their entire ED visit and are stable for discharge with outpatient follow-up. The plan has been discussed in detail and they are aware of the specific conditions for emergent return, as well as the importance of follow-up. New Prescriptions    No medications on file       Diagnosis:  1. Closed head injury, initial encounter    2. Contusion of left elbow, initial encounter        Disposition:  Patient's disposition: Discharge to home  Patient's condition is stable.              Lloyd Torres DO  07/17/22 1541

## 2022-08-04 LAB
ALBUMIN SERPL-MCNC: 4.1 G/DL (ref 3.5–5.2)
ALP BLD-CCNC: 96 U/L (ref 35–104)
ALT SERPL-CCNC: 20 U/L (ref 0–32)
ANION GAP SERPL CALCULATED.3IONS-SCNC: 12 MMOL/L (ref 7–16)
AST SERPL-CCNC: 28 U/L (ref 0–31)
BILIRUB SERPL-MCNC: 0.3 MG/DL (ref 0–1.2)
BUN BLDV-MCNC: 12 MG/DL (ref 6–23)
CALCIUM SERPL-MCNC: 9.3 MG/DL (ref 8.6–10.2)
CHLORIDE BLD-SCNC: 107 MMOL/L (ref 98–107)
CHOLESTEROL, TOTAL: 188 MG/DL (ref 0–199)
CO2: 25 MMOL/L (ref 22–29)
CREAT SERPL-MCNC: 0.9 MG/DL (ref 0.5–1)
GFR AFRICAN AMERICAN: >60
GFR NON-AFRICAN AMERICAN: >60 ML/MIN/1.73
GLUCOSE BLD-MCNC: 103 MG/DL (ref 74–99)
HDLC SERPL-MCNC: 67 MG/DL
LDL CHOLESTEROL CALCULATED: 100 MG/DL (ref 0–99)
POTASSIUM SERPL-SCNC: 4.3 MMOL/L (ref 3.5–5)
SODIUM BLD-SCNC: 144 MMOL/L (ref 132–146)
T4 FREE: 1.06 NG/DL (ref 0.93–1.7)
TOTAL PROTEIN: 6.9 G/DL (ref 6.4–8.3)
TRIGL SERPL-MCNC: 104 MG/DL (ref 0–149)
TSH SERPL DL<=0.05 MIU/L-ACNC: 3.37 UIU/ML (ref 0.27–4.2)
VLDLC SERPL CALC-MCNC: 21 MG/DL

## 2022-08-09 ENCOUNTER — OFFICE VISIT (OUTPATIENT)
Dept: PRIMARY CARE CLINIC | Age: 82
End: 2022-08-09
Payer: MEDICARE

## 2022-08-09 VITALS
HEART RATE: 77 BPM | SYSTOLIC BLOOD PRESSURE: 122 MMHG | WEIGHT: 201 LBS | BODY MASS INDEX: 33.49 KG/M2 | OXYGEN SATURATION: 98 % | HEIGHT: 65 IN | DIASTOLIC BLOOD PRESSURE: 68 MMHG | TEMPERATURE: 97.3 F

## 2022-08-09 VITALS
HEART RATE: 100 BPM | TEMPERATURE: 96.2 F | SYSTOLIC BLOOD PRESSURE: 142 MMHG | HEIGHT: 65 IN | BODY MASS INDEX: 32.32 KG/M2 | DIASTOLIC BLOOD PRESSURE: 74 MMHG | WEIGHT: 194 LBS

## 2022-08-09 DIAGNOSIS — I26.94 MULTIPLE SUBSEGMENTAL PULMONARY EMBOLI WITHOUT ACUTE COR PULMONALE (HCC): ICD-10-CM

## 2022-08-09 DIAGNOSIS — G47.33 OSA (OBSTRUCTIVE SLEEP APNEA): ICD-10-CM

## 2022-08-09 DIAGNOSIS — Z00.00 INITIAL MEDICARE ANNUAL WELLNESS VISIT: Primary | ICD-10-CM

## 2022-08-09 DIAGNOSIS — N63.21 UNSPECIFIED LUMP IN THE LEFT BREAST, UPPER OUTER QUADRANT: ICD-10-CM

## 2022-08-09 DIAGNOSIS — D50.8 OTHER IRON DEFICIENCY ANEMIA: ICD-10-CM

## 2022-08-09 DIAGNOSIS — E78.2 MIXED HYPERLIPIDEMIA: ICD-10-CM

## 2022-08-09 DIAGNOSIS — N63.20 BREAST MASS, LEFT: ICD-10-CM

## 2022-08-09 DIAGNOSIS — K21.9 GASTROESOPHAGEAL REFLUX DISEASE WITHOUT ESOPHAGITIS: ICD-10-CM

## 2022-08-09 PROCEDURE — 99214 OFFICE O/P EST MOD 30 MIN: CPT | Performed by: INTERNAL MEDICINE

## 2022-08-09 PROCEDURE — 1123F ACP DISCUSS/DSCN MKR DOCD: CPT | Performed by: INTERNAL MEDICINE

## 2022-08-09 PROCEDURE — G0439 PPPS, SUBSEQ VISIT: HCPCS | Performed by: INTERNAL MEDICINE

## 2022-08-09 RX ORDER — LYSINE 500 MG
500 TABLET ORAL DAILY
COMMUNITY

## 2022-08-09 RX ORDER — UBIDECARENONE 100 MG
100 CAPSULE ORAL DAILY
COMMUNITY
End: 2022-08-09

## 2022-08-09 RX ORDER — SULFAMETHOXAZOLE AND TRIMETHOPRIM 800; 160 MG/1; MG/1
TABLET ORAL
COMMUNITY
End: 2022-08-09

## 2022-08-09 RX ORDER — APIXABAN 2.5 MG/1
TABLET, FILM COATED ORAL
COMMUNITY
Start: 2022-08-01

## 2022-08-09 RX ORDER — OMEPRAZOLE 20 MG/1
20 CAPSULE, DELAYED RELEASE ORAL DAILY
COMMUNITY
End: 2022-08-15

## 2022-08-09 RX ORDER — IBUPROFEN 200 MG
CAPSULE ORAL
COMMUNITY
End: 2022-08-09

## 2022-08-09 RX ORDER — AMOXICILLIN 500 MG/1
CAPSULE ORAL
COMMUNITY
End: 2022-08-09

## 2022-08-09 RX ORDER — PREDNISONE 20 MG/1
TABLET ORAL
COMMUNITY
Start: 2022-06-17 | End: 2022-08-09

## 2022-08-09 RX ORDER — PAROXETINE 10 MG/1
10 TABLET, FILM COATED ORAL EVERY MORNING
Qty: 90 TABLET | Refills: 0 | Status: SHIPPED | OUTPATIENT
Start: 2022-08-09 | End: 2022-11-07

## 2022-08-09 RX ORDER — PREDNISONE 10 MG/1
TABLET ORAL
COMMUNITY
End: 2022-08-09

## 2022-08-09 RX ORDER — PHENAZOPYRIDINE HYDROCHLORIDE 200 MG/1
TABLET, FILM COATED ORAL
COMMUNITY
End: 2022-08-09

## 2022-08-09 RX ORDER — AMOXICILLIN 500 MG/1
CAPSULE ORAL
COMMUNITY
Start: 2022-05-31 | End: 2022-08-09

## 2022-08-09 SDOH — ECONOMIC STABILITY: FOOD INSECURITY: WITHIN THE PAST 12 MONTHS, YOU WORRIED THAT YOUR FOOD WOULD RUN OUT BEFORE YOU GOT MONEY TO BUY MORE.: NEVER TRUE

## 2022-08-09 SDOH — ECONOMIC STABILITY: FOOD INSECURITY: WITHIN THE PAST 12 MONTHS, THE FOOD YOU BOUGHT JUST DIDN'T LAST AND YOU DIDN'T HAVE MONEY TO GET MORE.: NEVER TRUE

## 2022-08-09 ASSESSMENT — LIFESTYLE VARIABLES
HOW MANY STANDARD DRINKS CONTAINING ALCOHOL DO YOU HAVE ON A TYPICAL DAY: 1 OR 2
HAVE YOU OR SOMEONE ELSE BEEN INJURED AS A RESULT OF YOUR DRINKING: 0
HOW OFTEN DO YOU HAVE A DRINK CONTAINING ALCOHOL: 4 OR MORE TIMES A WEEK
HOW OFTEN DURING THE LAST YEAR HAVE YOU BEEN UNABLE TO REMEMBER WHAT HAPPENED THE NIGHT BEFORE BECAUSE YOU HAD BEEN DRINKING: 0
HOW OFTEN DURING THE LAST YEAR HAVE YOU FOUND THAT YOU WERE NOT ABLE TO STOP DRINKING ONCE YOU HAD STARTED: 0
HAS A RELATIVE, FRIEND, DOCTOR, OR ANOTHER HEALTH PROFESSIONAL EXPRESSED CONCERN ABOUT YOUR DRINKING OR SUGGESTED YOU CUT DOWN: 0
HOW OFTEN DURING THE LAST YEAR HAVE YOU HAD A FEELING OF GUILT OR REMORSE AFTER DRINKING: 0
HOW OFTEN DURING THE LAST YEAR HAVE YOU NEEDED AN ALCOHOLIC DRINK FIRST THING IN THE MORNING TO GET YOURSELF GOING AFTER A NIGHT OF HEAVY DRINKING: 0
HOW OFTEN DURING THE LAST YEAR HAVE YOU FAILED TO DO WHAT WAS NORMALLY EXPECTED FROM YOU BECAUSE OF DRINKING: 0

## 2022-08-09 ASSESSMENT — PATIENT HEALTH QUESTIONNAIRE - PHQ9
SUM OF ALL RESPONSES TO PHQ9 QUESTIONS 1 & 2: 0
SUM OF ALL RESPONSES TO PHQ QUESTIONS 1-9: 0
SUM OF ALL RESPONSES TO PHQ QUESTIONS 1-9: 0
1. LITTLE INTEREST OR PLEASURE IN DOING THINGS: 0
SUM OF ALL RESPONSES TO PHQ QUESTIONS 1-9: 0
SUM OF ALL RESPONSES TO PHQ QUESTIONS 1-9: 0
2. FEELING DOWN, DEPRESSED OR HOPELESS: 0

## 2022-08-09 ASSESSMENT — SOCIAL DETERMINANTS OF HEALTH (SDOH): HOW HARD IS IT FOR YOU TO PAY FOR THE VERY BASICS LIKE FOOD, HOUSING, MEDICAL CARE, AND HEATING?: NOT HARD AT ALL

## 2022-08-09 NOTE — PROGRESS NOTES
Michaelene Scheuermann (:  1940) is a 80 y.o. female,Established patient, here for evaluation of the following chief complaint(s):  Follow-up and Breast Mass (Left side)      Subjective   SUBJECTIVE/OBJECTIVE:  HPI:  1)Hyperlipidemia:  Patient is here to follow up regarding chronic hyperlipidemia. This is  generally controlled. Treatment includes simvastatin. Patient is  compliant with lifestyle modifications. Patient is not a smoker. Most recent labs reviewed with patient today and are not remarkable. Comorbid conditions include obesity. 2)Depression:  Patient is here with complaints of depression. This is a chronic problem. This has been going on for years. Exacerbating factors include stress. Alleviating factors include paxil Depressive symptoms include none at this visit. Patient does not have suicidal or homicidal ideation. Patient is not having issues falling or staying asleep. Patient is not having associated panic attacks. The above is not interfering with quality of life. Patient does not use alcohol and/or drugs. 3)GERD:    She denies dysphagia. She has not lost weight. She denies melena, hematochezia, hematemesis, and coffee ground emesis. Medical therapy in the past has included antacids, H2 antagonists, and proton pump inhibitors.    4) Breast mass: noticed it a week ago firm in consistency on lt breast ,upper outer quadrant    Review of Systems  CBC with Differential:    Lab Results   Component Value Date/Time    WBC 5.1 2020 09:10 AM    RBC 4.54 2020 09:10 AM    HGB 7.9 07/10/2020 05:50 AM    HCT 25.0 07/10/2020 05:50 AM     2020 09:10 AM    MCV 91.4 2020 09:10 AM    MCH 28.2 2020 09:10 AM    MCHC 30.8 2020 09:10 AM    RDW 14.1 2020 09:10 AM     CMP:    Lab Results   Component Value Date/Time     2022 09:26 AM    K 4.3 2022 09:26 AM     2022 09:26 AM    CO2 25 2022 09:26 AM    BUN 12 08/04/2022 09:26 AM    CREATININE 0.9 08/04/2022 09:26 AM    GFRAA >60 08/04/2022 09:26 AM    LABGLOM >60 08/04/2022 09:26 AM    GLUCOSE 103 08/04/2022 09:26 AM    PROT 6.9 08/04/2022 09:26 AM    LABALBU 4.1 08/04/2022 09:26 AM    CALCIUM 9.3 08/04/2022 09:26 AM    BILITOT 0.3 08/04/2022 09:26 AM    ALKPHOS 96 08/04/2022 09:26 AM    AST 28 08/04/2022 09:26 AM    ALT 20 08/04/2022 09:26 AM     Lipid:   Lab Results   Component Value Date    CHOL 188 08/04/2022     Lab Results   Component Value Date    TRIG 104 08/04/2022     Lab Results   Component Value Date    HDL 67 08/04/2022     Lab Results   Component Value Date    LDLCALC 100 (H) 08/04/2022     Lab Results   Component Value Date    LABVLDL 21 08/04/2022     No results found for: CHOLHDLRATIO         Objective   /68   Pulse 77   Temp 97.3 °F (36.3 °C) (Temporal)   Ht 5' 5\" (1.651 m)   Wt 201 lb (91.2 kg)   SpO2 98%   BMI 33.45 kg/m²   Current Outpatient Medications   Medication Sig Dispense Refill    L-Lysine 500 MG TABS Take 500 mg by mouth in the morning. omeprazole (PRILOSEC) 20 MG delayed release capsule Take 20 mg by mouth in the morning. PARoxetine (PAXIL) 10 MG tablet Take 1 tablet by mouth every morning 90 tablet 0    acetaminophen (TYLENOL) 500 MG tablet Take 2 tablets by mouth every 8 hours 180 tablet 0    latanoprost (XALATAN) 0.005 % ophthalmic solution Place 1 drop into both eyes nightly       fluticasone (FLONASE) 50 MCG/ACT nasal spray 2 sprays by Nasal route daily       gabapentin (NEURONTIN) 300 MG capsule Take 300 mg by mouth as needed. Calcium Carb-Cholecalciferol (CALCIUM + D3 PO) Take 1 tablet by mouth 2 times daily Last  06/30/2020      lovastatin (MEVACOR) 40 MG tablet Take 40 mg by mouth nightly   1    ELIQUIS 2.5 MG TABS tablet TAKE 1 TABLET BY MOUTH TWICE DAILY       No current facility-administered medications for this visit.       Allergies   Allergen Reactions    Adhesive Tape Rash    Augmentin [Amoxicillin-Pot Clavulanate] Rash    Amoxicillin Rash    Clavulanic Acid Rash        Past Medical History:   Diagnosis Date    Acid reflux disease     Anxiety     Arthritis     Central retinal vein occlusion of right eye     with vision deficets    Glaucoma, left eye     History of fracture of femur 2006    right; no surgery    History of trigeminal neuralgia     Hyperlipidemia     Hypertension     Hypoglycemia     Right knee pain 07/2020    for TJAK 07/08/2020 Dr Duane Padilla    Symptoms consistent with irritable bowel syndrome     Use of cane as ambulatory aid     pain    Wears glasses      Past Surgical History:   Procedure Laterality Date    BREAST BIOPSY Bilateral 2015 right , 2020 left    BREAST LUMPECTOMY      CATARACT REMOVAL WITH IMPLANT Right 2005    CHOLECYSTECTOMY  2015    JOINT REPLACEMENT      OTHER SURGICAL HISTORY      arthroscopic thumb bilateral    REVISION TOTAL KNEE ARTHROPLASTY Right 7/8/2020    RIGHT KNEE TOTAL ARTHROPLASTY REVISION  ++LUCY++   ++PNB++ performed by Ivan [facility] MD at [facility] Bilateral 2013     No family history on file. Social History     Socioeconomic History    Marital status:      Spouse name: Not on file    Number of children: Not on file    Years of education: Not on file    Highest education level: Not on file   Occupational History    Not on file   Tobacco Use    Smoking status: Never    Smokeless tobacco: Never   Vaping Use    Vaping Use: Never used   Substance and Sexual Activity    Alcohol use:  Yes     Alcohol/week: 7.0 standard drinks     Types: 7 Glasses of wine per week    Drug use: No    Sexual activity: Not on file   Other Topics Concern    Not on file   Social History Narrative    Not on file     Social Determinants of Health     Financial Resource Strain: Low Risk     Difficulty of Paying Living Expenses: Not hard at all   Food Insecurity: No Food Insecurity    Worried About [facility] in the Last Year: Never true    Ran Out of Food in the Last Year: Never true   Transportation Needs: Not on file   Physical Activity: Sufficiently Active    Days of Exercise per Week: 2 days    Minutes of Exercise per Session: 90 min   Stress: Not on file   Social Connections: Not on file   Intimate Partner Violence: Not on file   Housing Stability: Not on file      Health Maintenance Due   Topic Date Due    Depression Screen  Never done    Hepatitis C screen  Never done    Shingles vaccine (2 of 3) 10/09/2014    COVID-19 Vaccine (4 - Booster) 03/04/2022    DTaP/Tdap/Td vaccine (1 - Tdap) 07/18/2022        Physical Exam  Constitutional:       Appearance: Normal appearance. She is normal weight. HENT:      Head: Normocephalic and atraumatic. Right Ear: Tympanic membrane and ear canal normal.      Left Ear: Tympanic membrane and ear canal normal.      Nose: Nose normal.      Mouth/Throat:      Mouth: Mucous membranes are moist.      Pharynx: Oropharynx is clear. Eyes:      Extraocular Movements: Extraocular movements intact. Conjunctiva/sclera: Conjunctivae normal.      Pupils: Pupils are equal, round, and reactive to light. Cardiovascular:      Rate and Rhythm: Normal rate and regular rhythm. Pulses: Normal pulses. Heart sounds: Normal heart sounds. Pulmonary:      Effort: Pulmonary effort is normal.      Breath sounds: Normal breath sounds. Chest:   Breasts:     Left: Mass present. Abdominal:      General: Abdomen is flat. Bowel sounds are normal.      Palpations: Abdomen is soft. Musculoskeletal:         General: Normal range of motion.       Right shoulder: Normal.      Left shoulder: Normal.      Right upper arm: Normal.      Left upper arm: Normal.      Right elbow: Normal.      Left elbow: Normal.      Right forearm: Normal.      Left forearm: Normal.      Right wrist: Normal.      Left wrist: Normal.      Right hand: Normal.      Left hand: Normal.      Cervical back: Normal, normal range of motion and neck supple. Lumbar back: Normal.   Skin:     General: Skin is warm and dry. Neurological:      General: No focal deficit present. Mental Status: She is alert and oriented to person, place, and time. Mental status is at baseline. Psychiatric:         Mood and Affect: Mood normal.         Behavior: Behavior normal.         Thought Content: Thought content normal.         Judgment: Judgment normal.             ASSESSMENT/PLAN:  1. Initial Medicare annual wellness visit  2. Breast mass, left  3. Mixed hyperlipidemia  4. Gastroesophageal reflux disease without esophagitis  5. Multiple subsegmental pulmonary emboli without acute cor pulmonale (HCC)  6. LAUREANO (obstructive sleep apnea)  Refer to [facility] for ultrasound guided biopsy  Having cpap machine rx by dr Phani Dodge    Return for Medicare Annual Wellness Visit in 1 year. An electronic signature was used to authenticate this note.     --Maverick Huerta MD

## 2022-08-09 NOTE — PROGRESS NOTES
Medicare Annual Wellness Visit    Adriane Morton is here for Follow-up and Breast Mass (Left side)    Assessment & Plan   Initial Medicare annual wellness visit    Recommendations for Preventive Services Due: see orders and patient instructions/AVS.  Recommended screening schedule for the next 5-10 years is provided to the patient in written form: see Patient Instructions/AVS.     Return for Medicare Annual Wellness Visit in 1 year. Subjective       Patient's complete Health Risk Assessment and screening values have been reviewed and are found in Flowsheets. The following problems were reviewed today and where indicated follow up appointments were made and/or referrals ordered. Positive Risk Factor Screenings with Interventions:    Fall Risk:  Do you feel unsteady or are you worried about falling? : no  2 or more falls in past year?: no  Fall with injury in past year?: (!) yes   Fall Risk Interventions:    Home safety tips provided             Health Habits/Nutrition:  Physical Activity: Sufficiently Active    Days of Exercise per Week: 2 days    Minutes of Exercise per Session: 90 min     Have you lost any weight without trying in the past 3 months?: No  Body mass index: (!) 33.44  Have you seen the dentist within the past year?: Yes  Health Habits/Nutrition Interventions:  Doing aerobics twice a week but refused pt             Objective   Vitals:    08/09/22 1224   BP: 122/68   Pulse: 77   Temp: 97.3 °F (36.3 °C)   TempSrc: Temporal   SpO2: 98%   Weight: 201 lb (91.2 kg)   Height: 5' 5\" (1.651 m)      Body mass index is 33.45 kg/m². Allergies   Allergen Reactions    Adhesive Tape Rash    Augmentin [Amoxicillin-Pot Clavulanate] Rash    Amoxicillin Rash    Clavulanic Acid Rash     Prior to Visit Medications    Medication Sig Taking? Authorizing Provider   L-Lysine 500 MG TABS Take 500 mg by mouth in the morning.  Yes Historical Provider, MD   omeprazole (PRILOSEC) 20 MG delayed release capsule Take 20 mg by mouth in the morning. Yes Historical Provider, MD   acetaminophen (TYLENOL) 500 MG tablet Take 2 tablets by mouth every 8 hours Yes Seble Villa MD   latanoprost (XALATAN) 0.005 % ophthalmic solution Place 1 drop into both eyes nightly  Yes Historical Provider, MD   fluticasone (FLONASE) 50 MCG/ACT nasal spray 2 sprays by Nasal route daily  Yes Historical Provider, MD   gabapentin (NEURONTIN) 300 MG capsule Take 300 mg by mouth as needed. Yes Historical Provider, MD   Calcium Carb-Cholecalciferol (CALCIUM + D3 PO) Take 1 tablet by mouth 2 times daily Last  06/30/2020 Yes Historical Provider, MD   lovastatin (MEVACOR) 40 MG tablet Take 40 mg by mouth nightly  Yes Historical Provider, MD   amoxicillin (AMOXIL) 500 MG capsule TK FOUR CS PO 1 HOUR B DAPP  Patient not taking: Reported on 8/9/2022  Historical Provider, MD   amoxicillin (AMOXIL) 500 MG capsule   Historical Provider, MD   calcium carbonate (OYSTER SHELL CALCIUM 500 MG) 1250 (500 Ca) MG tablet take one capsule by oral route once daily  Patient not taking: Reported on 8/9/2022  Historical Provider, MD   Cholecalciferol 50 MCG (2000 UT) TABS Take 2,000 Units by mouth in the morning. Patient not taking: Reported on 8/9/2022  Historical Provider, MD   coenzyme Q10 100 MG CAPS capsule Take 100 mg by mouth in the morning.   Patient not taking: Reported on 8/9/2022  Historical Provider, MD   phenazopyridine (PYRIDIUM) 200 MG tablet   Historical Provider, MD   predniSONE (DELTASONE) 20 MG tablet   Historical Provider, MD   predniSONE (DELTASONE) 10 MG tablet   Historical Provider, MD   sulfamethoxazole-trimethoprim (BACTRIM DS;SEPTRA DS) 800-160 MG per tablet   Historical Provider, MD   ELIQUIS 2.5 MG TABS tablet TAKE 1 TABLET BY MOUTH TWICE DAILY  Historical Provider, MD   PARoxetine (PAXIL) 20 MG tablet Take 20 mg by mouth every morning   Patient not taking: Reported on 8/9/2022  Historical Provider, MD   omeprazole (PRILOSEC) 20 MG capsule Take 40 mg by mouth daily.   Patient not taking: Reported on 8/9/2022  Historical Provider, MD Damon (Including outside providers/suppliers regularly involved in providing care):   Patient Care Team:  Xavier Mann MD as PCP - General (Internal Medicine)  Sri Son MD as Consulting Physician (Cardiology)     Reviewed and updated this visit:  Tobacco  Allergies  Meds  Med Hx  Surg Hx  Soc Hx  Fam Hx

## 2022-08-15 RX ORDER — OMEPRAZOLE 20 MG/1
20 CAPSULE, DELAYED RELEASE ORAL
Qty: 90 CAPSULE | Refills: 0 | Status: SHIPPED | OUTPATIENT
Start: 2022-08-15 | End: 2022-11-13

## 2022-08-17 RX ORDER — PAROXETINE 10 MG/1
10 TABLET, FILM COATED ORAL EVERY MORNING
Qty: 90 TABLET | Refills: 0 | OUTPATIENT
Start: 2022-08-17 | End: 2022-11-15

## 2022-08-22 ENCOUNTER — TELEPHONE (OUTPATIENT)
Dept: PRIMARY CARE CLINIC | Age: 82
End: 2022-08-22

## 2022-08-22 ENCOUNTER — HOSPITAL ENCOUNTER (OUTPATIENT)
Dept: GENERAL RADIOLOGY | Age: 82
Discharge: HOME | End: 2022-08-24
Payer: MEDICARE

## 2022-08-22 DIAGNOSIS — R92.8 ABNORMAL MAMMOGRAM OF LEFT BREAST: Primary | ICD-10-CM

## 2022-08-22 DIAGNOSIS — N63.20 BREAST MASS, LEFT: ICD-10-CM

## 2022-08-22 DIAGNOSIS — N63.21 UNSPECIFIED LUMP IN THE LEFT BREAST, UPPER OUTER QUADRANT: ICD-10-CM

## 2022-08-22 DIAGNOSIS — R92.8 ABNORMAL MAMMOGRAM OF LEFT BREAST: ICD-10-CM

## 2022-08-22 PROCEDURE — 76642 ULTRASOUND BREAST LIMITED: CPT

## 2022-08-22 PROCEDURE — A4648 IMPLANTABLE TISSUE MARKER: HCPCS

## 2022-08-22 PROCEDURE — 2500000003 HC RX 250 WO HCPCS

## 2022-08-22 PROCEDURE — 88342 IMHCHEM/IMCYTCHM 1ST ANTB: CPT

## 2022-08-22 PROCEDURE — 77065 DX MAMMO INCL CAD UNI: CPT

## 2022-08-22 PROCEDURE — 88305 TISSUE EXAM BY PATHOLOGIST: CPT

## 2022-08-22 PROCEDURE — 88341 IMHCHEM/IMCYTCHM EA ADD ANTB: CPT

## 2022-08-22 NOTE — PROGRESS NOTES
Met with patient prior to her breast biopsy. Instructed on ultrasound guided  breast biopsy procedure. She takes Eliquis. I remained with her during the biopsy. She tolerated biopsy well. Instructed that results will be available in approximately 3-5 business days. She confirms that she has an active [employer] account and is agreeable to discussion of breast biopsy results by phone. Instructed that her physician will also be notified of results. Provided with folder containing my contact information and post biopsy discharge instructions. Instructed to call me if she has any questions or concerns about her biopsy. Verbalizes understanding.   Electronically signed by Shanta Perkins RN, BSN on 8/22/2022 at 12:44 PM

## 2022-08-22 NOTE — TELEPHONE ENCOUNTER
Judith turner called to request an US left breast to be done prior to the biopsy sched today at [address] from [facility] states to add \"per breast center protocol\" to order.  Michelle Dumont states it will eliminate another phone call) and she can amend current order  Last seen 8/9/2022  Next appt 11/14/2022

## 2022-08-30 ENCOUNTER — TELEPHONE (OUTPATIENT)
Dept: GENERAL RADIOLOGY | Age: 82
End: 2022-08-30

## 2022-08-30 ENCOUNTER — OFFICE VISIT (OUTPATIENT)
Dept: PRIMARY CARE CLINIC | Age: 82
End: 2022-08-30
Payer: MEDICARE

## 2022-08-30 VITALS
OXYGEN SATURATION: 94 % | WEIGHT: 204.2 LBS | TEMPERATURE: 97.3 F | SYSTOLIC BLOOD PRESSURE: 132 MMHG | DIASTOLIC BLOOD PRESSURE: 76 MMHG | BODY MASS INDEX: 34.02 KG/M2 | HEIGHT: 65 IN | HEART RATE: 94 BPM

## 2022-08-30 DIAGNOSIS — Z91.81 AT HIGH RISK FOR FALLS: Primary | ICD-10-CM

## 2022-08-30 DIAGNOSIS — N63.0 BREAST MASS: ICD-10-CM

## 2022-08-30 PROCEDURE — 1123F ACP DISCUSS/DSCN MKR DOCD: CPT | Performed by: INTERNAL MEDICINE

## 2022-08-30 PROCEDURE — 99213 OFFICE O/P EST LOW 20 MIN: CPT | Performed by: INTERNAL MEDICINE

## 2022-08-30 ASSESSMENT — ENCOUNTER SYMPTOMS
ABDOMINAL PAIN: 0
BLOOD IN STOOL: 0
TROUBLE SWALLOWING: 0
CONSTIPATION: 0
EYE ITCHING: 0
BACK PAIN: 0
SORE THROAT: 0
PHOTOPHOBIA: 0
WHEEZING: 0
EYE DISCHARGE: 0
VOMITING: 0
COUGH: 0
SINUS PAIN: 0
SHORTNESS OF BREATH: 0
NAUSEA: 0
DIARRHEA: 0
ABDOMINAL DISTENTION: 0
APNEA: 0

## 2022-08-30 NOTE — TELEPHONE ENCOUNTER
Left message on answering machine regarding benign breast biopsy results and recommendation to return to screening mammogram in 1 year. Breast pathology report routed to Dr.S. Weems.  Electronically signed by Xochitl Orr RN, BSN on 8/30/2022 at 9:48 AM

## 2022-08-30 NOTE — PROGRESS NOTES
Medications   Medication Sig Dispense Refill    omeprazole (PRILOSEC) 20 MG delayed release capsule Take 1 capsule by mouth every morning (before breakfast) 90 capsule 0    L-Lysine 500 MG TABS Take 500 mg by mouth in the morning. ELIQUIS 2.5 MG TABS tablet TAKE 1 TABLET BY MOUTH TWICE DAILY      PARoxetine (PAXIL) 10 MG tablet Take 1 tablet by mouth every morning 90 tablet 0    acetaminophen (TYLENOL) 500 MG tablet Take 2 tablets by mouth every 8 hours 180 tablet 0    latanoprost (XALATAN) 0.005 % ophthalmic solution Place 1 drop into both eyes nightly       fluticasone (FLONASE) 50 MCG/ACT nasal spray 2 sprays by Nasal route daily       gabapentin (NEURONTIN) 300 MG capsule Take 300 mg by mouth as needed. Calcium Carb-Cholecalciferol (CALCIUM + D3 PO) Take 1 tablet by mouth 2 times daily Last  06/30/2020      lovastatin (MEVACOR) 40 MG tablet Take 40 mg by mouth nightly   1     No current facility-administered medications for this visit.       Allergies   Allergen Reactions    Adhesive Tape Rash    Augmentin [Amoxicillin-Pot Clavulanate] Rash    Amoxicillin Rash    Clavulanic Acid Rash        Past Medical History:   Diagnosis Date    Acid reflux disease     Anxiety     Arthritis     Central retinal vein occlusion of right eye     with vision deficets    Glaucoma, left eye     History of fracture of femur 2006    right; no surgery    History of trigeminal neuralgia     Hyperlipidemia     Hypertension     Hypoglycemia     Right knee pain 07/2020    for TJAK 07/08/2020 Dr Guillaume Moots    Symptoms consistent with irritable bowel syndrome     Use of cane as ambulatory aid     pain    Wears glasses      Past Surgical History:   Procedure Laterality Date    BREAST BIOPSY Bilateral 2015 right , 2020 left    BREAST LUMPECTOMY      CATARACT REMOVAL WITH IMPLANT Right 2005    CHOLECYSTECTOMY  2015    JOINT REPLACEMENT      OTHER SURGICAL HISTORY      arthroscopic thumb bilateral    REVISION TOTAL KNEE ARTHROPLASTY Right 7/8/2020    RIGHT KNEE TOTAL ARTHROPLASTY REVISION  ++LUCY++   ++PNB++ performed by Yany Newsome MD at [address] Bilateral 2013    US BREAST NEEDLE BIOPSY LEFT Left 8/22/2022    US BREAST NEEDLE BIOPSY LEFT 8/22/2022 [facility]     No family history on file. Social History     Socioeconomic History    Marital status:      Spouse name: Not on file    Number of children: Not on file    Years of education: Not on file    Highest education level: Not on file   Occupational History    Not on file   Tobacco Use    Smoking status: Never    Smokeless tobacco: Never   Vaping Use    Vaping Use: Never used   Substance and Sexual Activity    Alcohol use: Yes     Alcohol/week: 7.0 standard drinks     Types: 7 Glasses of wine per week    Drug use: No    Sexual activity: Not on file   Other Topics Concern    Not on file   Social History Narrative    Not on file     Social Determinants of Health     Financial Resource Strain: Low Risk     Difficulty of Paying Living Expenses: Not hard at all   Food Insecurity: No Food Insecurity    Worried About Running Out of Food in the Last Year: Never true    Ran Out of Food in the Last Year: Never true   Transportation Needs: Not on file   Physical Activity: Sufficiently Active    Days of Exercise per Week: 2 days    Minutes of Exercise per Session: 90 min   Stress: Not on file   Social Connections: Not on file   Intimate Partner Violence: Not on file   Housing Stability: Not on file      Health Maintenance Due   Topic Date Due    Hepatitis C screen  Never done    Shingles vaccine (2 of 3) 10/09/2014    COVID-19 Vaccine (4 - Booster) 03/04/2022    DTaP/Tdap/Td vaccine (1 - Tdap) 07/18/2022        Physical Exam  Constitutional:       Appearance: Normal appearance. She is normal weight. HENT:      Head: Normocephalic and atraumatic.       Right Ear: Tympanic membrane and ear canal normal.      Left Ear: Tympanic membrane and ear canal normal.      Nose: Nose normal.      Mouth/Throat:      Mouth: Mucous membranes are moist.      Pharynx: Oropharynx is clear. Eyes:      Extraocular Movements: Extraocular movements intact. Conjunctiva/sclera: Conjunctivae normal.      Pupils: Pupils are equal, round, and reactive to light. Cardiovascular:      Rate and Rhythm: Normal rate and regular rhythm. Pulses: Normal pulses. Heart sounds: Normal heart sounds. Pulmonary:      Effort: Pulmonary effort is normal.      Breath sounds: Normal breath sounds. Abdominal:      General: Abdomen is flat. Bowel sounds are normal.      Palpations: Abdomen is soft. Musculoskeletal:         General: Normal range of motion. Right shoulder: Normal.      Left shoulder: Normal.      Right upper arm: Normal.      Left upper arm: Normal.      Right elbow: Normal.      Left elbow: Normal.      Right forearm: Normal.      Left forearm: Normal.      Right wrist: Normal.      Left wrist: Normal.      Right hand: Normal.      Left hand: Normal.      Cervical back: Normal, normal range of motion and neck supple. Lumbar back: Normal.   Skin:     General: Skin is warm and dry. Neurological:      General: No focal deficit present. Mental Status: She is alert and oriented to person, place, and time. Mental status is at baseline. Psychiatric:         Mood and Affect: Mood normal.         Behavior: Behavior normal.         Thought Content: Thought content normal.         Judgment: Judgment normal.             ASSESSMENT/PLAN:  1. At high risk for falls  2. Breast mass      No follow-ups on file. An electronic signature was used to authenticate this note.     --Adelita Muhammad MD   .

## 2022-09-07 RX ORDER — LOVASTATIN 40 MG/1
40 TABLET ORAL NIGHTLY
Qty: 30 TABLET | Refills: 1 | Status: SHIPPED | OUTPATIENT
Start: 2022-09-07

## 2022-09-08 RX ORDER — LOVASTATIN 40 MG/1
TABLET ORAL
Qty: 90 TABLET | OUTPATIENT
Start: 2022-09-08

## 2022-10-13 ENCOUNTER — TELEPHONE (OUTPATIENT)
Dept: PRIMARY CARE CLINIC | Age: 82
End: 2022-10-13

## 2022-10-13 NOTE — TELEPHONE ENCOUNTER
Pt called in stating she has head cold. Cough is bothering her the most where she has difficulty sleeping.  Asking if  recommends otc medication for cough  Please advise  Uses [facility] in [place]

## 2022-11-09 DIAGNOSIS — D50.8 OTHER IRON DEFICIENCY ANEMIA: ICD-10-CM

## 2022-11-09 DIAGNOSIS — E78.2 MIXED HYPERLIPIDEMIA: ICD-10-CM

## 2022-11-10 LAB
ALBUMIN SERPL-MCNC: 3.9 G/DL (ref 3.5–5.2)
ALP BLD-CCNC: 93 U/L (ref 35–104)
ALT SERPL-CCNC: 14 U/L (ref 0–32)
ANION GAP SERPL CALCULATED.3IONS-SCNC: 10 MMOL/L (ref 7–16)
AST SERPL-CCNC: 21 U/L (ref 0–31)
BASOPHILS ABSOLUTE: 0.01 E9/L (ref 0–0.2)
BASOPHILS RELATIVE PERCENT: 0.2 % (ref 0–2)
BILIRUB SERPL-MCNC: 0.3 MG/DL (ref 0–1.2)
BUN BLDV-MCNC: 16 MG/DL (ref 6–23)
CALCIUM SERPL-MCNC: 9.7 MG/DL (ref 8.6–10.2)
CHLORIDE BLD-SCNC: 104 MMOL/L (ref 98–107)
CHOLESTEROL, FASTING: 219 MG/DL (ref 0–199)
CO2: 26 MMOL/L (ref 22–29)
CREAT SERPL-MCNC: 0.8 MG/DL (ref 0.5–1)
EOSINOPHILS ABSOLUTE: 0.18 E9/L (ref 0.05–0.5)
EOSINOPHILS RELATIVE PERCENT: 3.5 % (ref 0–6)
GFR SERPL CREATININE-BSD FRML MDRD: >60 ML/MIN/1.73
GLUCOSE BLD-MCNC: 109 MG/DL (ref 74–99)
HCT VFR BLD CALC: 39.4 % (ref 34–48)
HDLC SERPL-MCNC: 70 MG/DL
HEMOGLOBIN: 12.2 G/DL (ref 11.5–15.5)
IMMATURE GRANULOCYTES #: 0.01 E9/L
IMMATURE GRANULOCYTES %: 0.2 % (ref 0–5)
LDL CHOLESTEROL CALCULATED: 122 MG/DL (ref 0–99)
LYMPHOCYTES ABSOLUTE: 1.37 E9/L (ref 1.5–4)
LYMPHOCYTES RELATIVE PERCENT: 26.8 % (ref 20–42)
MCH RBC QN AUTO: 27.9 PG (ref 26–35)
MCHC RBC AUTO-ENTMCNC: 31 % (ref 32–34.5)
MCV RBC AUTO: 90 FL (ref 80–99.9)
MONOCYTES ABSOLUTE: 0.39 E9/L (ref 0.1–0.95)
MONOCYTES RELATIVE PERCENT: 7.6 % (ref 2–12)
NEUTROPHILS ABSOLUTE: 3.16 E9/L (ref 1.8–7.3)
NEUTROPHILS RELATIVE PERCENT: 61.7 % (ref 43–80)
PDW BLD-RTO: 15.3 FL (ref 11.5–15)
PLATELET # BLD: 257 E9/L (ref 130–450)
PMV BLD AUTO: 10.3 FL (ref 7–12)
POTASSIUM SERPL-SCNC: 4.4 MMOL/L (ref 3.5–5)
RBC # BLD: 4.38 E12/L (ref 3.5–5.5)
SODIUM BLD-SCNC: 140 MMOL/L (ref 132–146)
TOTAL PROTEIN: 6.8 G/DL (ref 6.4–8.3)
TRIGLYCERIDE, FASTING: 137 MG/DL (ref 0–149)
VLDLC SERPL CALC-MCNC: 27 MG/DL
WBC # BLD: 5.1 E9/L (ref 4.5–11.5)

## 2022-11-14 ENCOUNTER — OFFICE VISIT (OUTPATIENT)
Dept: PRIMARY CARE CLINIC | Age: 82
End: 2022-11-14
Payer: MEDICARE

## 2022-11-14 VITALS
DIASTOLIC BLOOD PRESSURE: 74 MMHG | HEART RATE: 86 BPM | SYSTOLIC BLOOD PRESSURE: 122 MMHG | TEMPERATURE: 97.4 F | HEIGHT: 65 IN | WEIGHT: 197 LBS | BODY MASS INDEX: 32.82 KG/M2 | OXYGEN SATURATION: 96 %

## 2022-11-14 DIAGNOSIS — E78.2 MIXED HYPERLIPIDEMIA: Primary | ICD-10-CM

## 2022-11-14 DIAGNOSIS — I26.94 MULTIPLE SUBSEGMENTAL PULMONARY EMBOLI WITHOUT ACUTE COR PULMONALE (HCC): ICD-10-CM

## 2022-11-14 DIAGNOSIS — F32.9 REACTIVE DEPRESSION: ICD-10-CM

## 2022-11-14 DIAGNOSIS — H25.12 AGE-RELATED NUCLEAR CATARACT OF LEFT EYE: ICD-10-CM

## 2022-11-14 PROCEDURE — 1123F ACP DISCUSS/DSCN MKR DOCD: CPT | Performed by: INTERNAL MEDICINE

## 2022-11-14 PROCEDURE — 99214 OFFICE O/P EST MOD 30 MIN: CPT | Performed by: INTERNAL MEDICINE

## 2022-11-14 RX ORDER — OMEPRAZOLE 20 MG/1
20 CAPSULE, DELAYED RELEASE ORAL
Qty: 90 CAPSULE | Refills: 0 | Status: SHIPPED | OUTPATIENT
Start: 2022-11-14 | End: 2023-02-12

## 2022-11-14 RX ORDER — PAROXETINE 10 MG/1
10 TABLET, FILM COATED ORAL EVERY MORNING
Qty: 90 TABLET | Refills: 0 | Status: SHIPPED | OUTPATIENT
Start: 2022-11-14 | End: 2023-02-12

## 2022-11-14 RX ORDER — LOVASTATIN 40 MG/1
40 TABLET ORAL NIGHTLY
Qty: 90 TABLET | Refills: 0 | Status: SHIPPED | OUTPATIENT
Start: 2022-11-14

## 2022-11-14 ASSESSMENT — ENCOUNTER SYMPTOMS
SINUS PAIN: 0
PHOTOPHOBIA: 0
EYE ITCHING: 0
ABDOMINAL PAIN: 0
COUGH: 0
WHEEZING: 0
APNEA: 0
SORE THROAT: 0
ABDOMINAL DISTENTION: 0
SHORTNESS OF BREATH: 0
NAUSEA: 0
TROUBLE SWALLOWING: 0
BACK PAIN: 0
CONSTIPATION: 0
BLOOD IN STOOL: 0
DIARRHEA: 0
EYE DISCHARGE: 0
VOMITING: 0

## 2022-11-14 NOTE — PROGRESS NOTES
MCH 27.9 11/09/2022 09:56 AM    MCHC 31.0 11/09/2022 09:56 AM    RDW 15.3 11/09/2022 09:56 AM    LYMPHOPCT 26.8 11/09/2022 09:56 AM    MONOPCT 7.6 11/09/2022 09:56 AM    BASOPCT 0.2 11/09/2022 09:56 AM    MONOSABS 0.39 11/09/2022 09:56 AM    LYMPHSABS 1.37 11/09/2022 09:56 AM    EOSABS 0.18 11/09/2022 09:56 AM    BASOSABS 0.01 11/09/2022 09:56 AM     CMP:    Lab Results   Component Value Date/Time     11/09/2022 09:56 AM    K 4.4 11/09/2022 09:56 AM     11/09/2022 09:56 AM    CO2 26 11/09/2022 09:56 AM    BUN 16 11/09/2022 09:56 AM    CREATININE 0.8 11/09/2022 09:56 AM    GFRAA >60 08/04/2022 09:26 AM    LABGLOM >60 11/09/2022 09:56 AM    GLUCOSE 109 11/09/2022 09:56 AM    PROT 6.8 11/09/2022 09:56 AM    LABALBU 3.9 11/09/2022 09:56 AM    CALCIUM 9.7 11/09/2022 09:56 AM    BILITOT 0.3 11/09/2022 09:56 AM    ALKPHOS 93 11/09/2022 09:56 AM    AST 21 11/09/2022 09:56 AM    ALT 14 11/09/2022 09:56 AM     Lipid:   Lab Results   Component Value Date    CHOL 188 08/04/2022     Lab Results   Component Value Date    TRIG 104 08/04/2022     Lab Results   Component Value Date    HDL 70 11/09/2022    HDL 67 08/04/2022     Lab Results   Component Value Date    LDLCALC 122 (H) 11/09/2022    LDLCALC 100 (H) 08/04/2022     Lab Results   Component Value Date    LABVLDL 27 11/09/2022    LABVLDL 21 08/04/2022     No results found for: CHOLHDLRATIO         Objective   /74   Pulse 86   Temp 97.4 °F (36.3 °C) (Temporal)   Ht 5' 5\" (1.651 m)   Wt 197 lb (89.4 kg)   SpO2 96%   BMI 32.78 kg/m²   Current Outpatient Medications   Medication Sig Dispense Refill    omeprazole (PRILOSEC) 20 MG delayed release capsule Take 1 capsule by mouth every morning (before breakfast) 90 capsule 0    lovastatin (MEVACOR) 40 MG tablet Take 1 tablet by mouth nightly 90 tablet 0    PARoxetine (PAXIL) 10 MG tablet Take 1 tablet by mouth every morning 90 tablet 0    ELIQUIS 2.5 MG TABS tablet TAKE 1 TABLET BY MOUTH TWICE DAILY acetaminophen (TYLENOL) 500 MG tablet Take 2 tablets by mouth every 8 hours 180 tablet 0    latanoprost (XALATAN) 0.005 % ophthalmic solution Place 1 drop into both eyes nightly       fluticasone (FLONASE) 50 MCG/ACT nasal spray 2 sprays by Nasal route daily       Calcium Carb-Cholecalciferol (CALCIUM + D3 PO) Take 1 tablet by mouth 2 times daily Last  06/30/2020       No current facility-administered medications for this visit. Allergies   Allergen Reactions    Adhesive Tape Rash    Augmentin [Amoxicillin-Pot Clavulanate] Rash    Amoxicillin Rash    Clavulanic Acid Rash        Past Medical History:   Diagnosis Date    Acid reflux disease     Anxiety     Arthritis     Central retinal vein occlusion of right eye     with vision deficets    Glaucoma, left eye     History of fracture of femur 2006    right; no surgery    History of trigeminal neuralgia     Hyperlipidemia     Hypertension     Hypoglycemia     Right knee pain 07/2020    for TJAK 07/08/2020 Dr Feli Gunter    Symptoms consistent with irritable bowel syndrome     Use of cane as ambulatory aid     pain    Wears glasses      Past Surgical History:   Procedure Laterality Date    BREAST BIOPSY Bilateral 2015 right , 2020 left    BREAST LUMPECTOMY      CATARACT REMOVAL WITH IMPLANT Right 2005    CHOLECYSTECTOMY  2015    JOINT REPLACEMENT      OTHER SURGICAL HISTORY      arthroscopic thumb bilateral    REVISION TOTAL KNEE ARTHROPLASTY Right 7/8/2020    RIGHT KNEE TOTAL ARTHROPLASTY REVISION  ++LUCY++   ++PNB++ performed by Amna Pichardo MD at [address] Bilateral 2013    US BREAST NEEDLE BIOPSY LEFT Left 8/22/2022    US BREAST NEEDLE BIOPSY LEFT 8/22/2022 [facility]     History reviewed. No pertinent family history.    Social History     Socioeconomic History    Marital status:      Spouse name: Not on file    Number of children: Not on file    Years of education: Not on file    Highest education level: Not on file   Occupational History    Not on file   Tobacco Use    Smoking status: Never    Smokeless tobacco: Never   Vaping Use    Vaping Use: Never used   Substance and Sexual Activity    Alcohol use: Yes     Alcohol/week: 7.0 standard drinks     Types: 7 Glasses of wine per week    Drug use: No    Sexual activity: Not on file   Other Topics Concern    Not on file   Social History Narrative    Not on file     Social Determinants of Health     Financial Resource Strain: Low Risk     Difficulty of Paying Living Expenses: Not hard at all   Food Insecurity: No Food Insecurity    Worried About Running Out of Food in the Last Year: Never true    Ran Out of Food in the Last Year: Never true   Transportation Needs: Not on file   Physical Activity: Sufficiently Active    Days of Exercise per Week: 2 days    Minutes of Exercise per Session: 90 min   Stress: Not on file   Social Connections: Not on file   Intimate Partner Violence: Not on file   Housing Stability: Not on file      Health Maintenance Due   Topic Date Due    Shingles vaccine (2 of 3) 10/09/2014    COVID-19 Vaccine (4 - Booster) 12/30/2021        Physical Exam  Constitutional:       Appearance: Normal appearance. She is normal weight. HENT:      Head: Normocephalic and atraumatic. Right Ear: Tympanic membrane and ear canal normal.      Left Ear: Tympanic membrane and ear canal normal.      Nose: Nose normal.      Mouth/Throat:      Mouth: Mucous membranes are moist.      Pharynx: Oropharynx is clear. Eyes:      Extraocular Movements: Extraocular movements intact. Conjunctiva/sclera: Conjunctivae normal.      Pupils: Pupils are equal, round, and reactive to light. Cardiovascular:      Rate and Rhythm: Normal rate and regular rhythm. Pulses: Normal pulses. Heart sounds: Normal heart sounds. Pulmonary:      Effort: Pulmonary effort is normal.      Breath sounds: Normal breath sounds. Abdominal:      General: Abdomen is flat.  Bowel sounds are normal.      Palpations: Abdomen is soft. Musculoskeletal:         General: Normal range of motion. Right shoulder: Normal.      Left shoulder: Normal.      Right upper arm: Normal.      Left upper arm: Normal.      Right elbow: Normal.      Left elbow: Normal.      Right forearm: Normal.      Left forearm: Normal.      Right wrist: Normal.      Left wrist: Normal.      Right hand: Normal.      Left hand: Normal.      Cervical back: Normal, normal range of motion and neck supple. Lumbar back: Normal.   Skin:     General: Skin is warm and dry. Neurological:      General: No focal deficit present. Mental Status: She is alert and oriented to person, place, and time. Mental status is at baseline. Psychiatric:         Mood and Affect: Mood normal.         Behavior: Behavior normal.         Thought Content: Thought content normal.         Judgment: Judgment normal.             ASSESSMENT/PLAN:  1. Mixed hyperlipidemia  -     T4, Free; Future  -     TSH; Future  -     Comprehensive Metabolic Panel; Future  -     Lipid, Fasting; Future  2. Multiple subsegmental pulmonary emboli without acute cor pulmonale (HCC)  3. Reactive depression  4. Age-related nuclear cataract of left eye      Return in about 3 months (around 2/14/2023). An electronic signature was used to authenticate this note.     --Jasmin Campbell MD

## 2023-01-02 RX ORDER — APIXABAN 2.5 MG/1
TABLET, FILM COATED ORAL
Qty: 180 TABLET | Refills: 0 | Status: SHIPPED | OUTPATIENT
Start: 2023-01-02

## 2023-01-09 RX ORDER — APIXABAN 2.5 MG/1
TABLET, FILM COATED ORAL
Qty: 180 TABLET | Refills: 0 | OUTPATIENT
Start: 2023-01-09

## 2023-01-24 ASSESSMENT — ENCOUNTER SYMPTOMS
VOMITING: 0
EYE DISCHARGE: 0
APNEA: 0
DIARRHEA: 0
COUGH: 0
BACK PAIN: 0
ABDOMINAL DISTENTION: 0
BLOOD IN STOOL: 0
CONSTIPATION: 0
ABDOMINAL PAIN: 0
TROUBLE SWALLOWING: 0
SORE THROAT: 0
EYE ITCHING: 0
NAUSEA: 0
PHOTOPHOBIA: 0
SHORTNESS OF BREATH: 0
WHEEZING: 0
SINUS PAIN: 0

## 2023-02-15 DIAGNOSIS — E78.2 MIXED HYPERLIPIDEMIA: ICD-10-CM

## 2023-02-15 LAB
ALBUMIN SERPL-MCNC: 4 G/DL (ref 3.5–5.2)
ALP BLD-CCNC: 90 U/L (ref 35–104)
ALT SERPL-CCNC: 15 U/L (ref 0–32)
ANION GAP SERPL CALCULATED.3IONS-SCNC: 10 MMOL/L (ref 7–16)
AST SERPL-CCNC: 22 U/L (ref 0–31)
BILIRUB SERPL-MCNC: 0.4 MG/DL (ref 0–1.2)
BUN BLDV-MCNC: 16 MG/DL (ref 6–23)
CALCIUM SERPL-MCNC: 9.3 MG/DL (ref 8.6–10.2)
CHLORIDE BLD-SCNC: 103 MMOL/L (ref 98–107)
CHOLESTEROL, FASTING: 205 MG/DL (ref 0–199)
CO2: 25 MMOL/L (ref 22–29)
CREAT SERPL-MCNC: 0.9 MG/DL (ref 0.5–1)
GFR SERPL CREATININE-BSD FRML MDRD: >60 ML/MIN/1.73
GLUCOSE BLD-MCNC: 108 MG/DL (ref 74–99)
HDLC SERPL-MCNC: 71 MG/DL
LDL CHOLESTEROL CALCULATED: 106 MG/DL (ref 0–99)
POTASSIUM SERPL-SCNC: 4.3 MMOL/L (ref 3.5–5)
SODIUM BLD-SCNC: 138 MMOL/L (ref 132–146)
T4 FREE: 1.04 NG/DL (ref 0.93–1.7)
TOTAL PROTEIN: 6.9 G/DL (ref 6.4–8.3)
TRIGLYCERIDE, FASTING: 140 MG/DL (ref 0–149)
TSH SERPL DL<=0.05 MIU/L-ACNC: 2.04 UIU/ML (ref 0.27–4.2)
VLDLC SERPL CALC-MCNC: 28 MG/DL

## 2023-02-20 ENCOUNTER — OFFICE VISIT (OUTPATIENT)
Dept: PRIMARY CARE CLINIC | Age: 83
End: 2023-02-20
Payer: MEDICARE

## 2023-02-20 VITALS
WEIGHT: 203.8 LBS | HEIGHT: 65 IN | HEART RATE: 80 BPM | SYSTOLIC BLOOD PRESSURE: 132 MMHG | OXYGEN SATURATION: 95 % | DIASTOLIC BLOOD PRESSURE: 64 MMHG | BODY MASS INDEX: 33.95 KG/M2 | RESPIRATION RATE: 18 BRPM | TEMPERATURE: 97.7 F

## 2023-02-20 DIAGNOSIS — F32.9 REACTIVE DEPRESSION: ICD-10-CM

## 2023-02-20 DIAGNOSIS — I26.94 MULTIPLE SUBSEGMENTAL PULMONARY EMBOLI WITHOUT ACUTE COR PULMONALE (HCC): ICD-10-CM

## 2023-02-20 DIAGNOSIS — M17.11 OSTEOARTHRITIS OF RIGHT PATELLOFEMORAL JOINT: ICD-10-CM

## 2023-02-20 DIAGNOSIS — E78.2 MIXED HYPERLIPIDEMIA: Primary | ICD-10-CM

## 2023-02-20 PROCEDURE — 1123F ACP DISCUSS/DSCN MKR DOCD: CPT | Performed by: INTERNAL MEDICINE

## 2023-02-20 PROCEDURE — 99214 OFFICE O/P EST MOD 30 MIN: CPT | Performed by: INTERNAL MEDICINE

## 2023-02-20 RX ORDER — APIXABAN 2.5 MG/1
TABLET, FILM COATED ORAL
Qty: 180 TABLET | Refills: 0 | Status: SHIPPED | OUTPATIENT
Start: 2023-02-20

## 2023-02-20 RX ORDER — FLUTICASONE PROPIONATE 50 MCG
2 SPRAY, SUSPENSION (ML) NASAL DAILY
Qty: 16 G | Refills: 1 | Status: SHIPPED | OUTPATIENT
Start: 2023-02-20

## 2023-02-20 RX ORDER — OMEPRAZOLE 20 MG/1
20 CAPSULE, DELAYED RELEASE ORAL
Qty: 90 CAPSULE | Refills: 0 | Status: SHIPPED | OUTPATIENT
Start: 2023-02-20 | End: 2023-05-21

## 2023-02-20 RX ORDER — LOVASTATIN 40 MG/1
40 TABLET ORAL NIGHTLY
Qty: 90 TABLET | Refills: 0 | Status: SHIPPED | OUTPATIENT
Start: 2023-02-20

## 2023-02-20 RX ORDER — PAROXETINE 10 MG/1
10 TABLET, FILM COATED ORAL EVERY MORNING
Qty: 90 TABLET | Refills: 0 | Status: SHIPPED | OUTPATIENT
Start: 2023-02-20 | End: 2023-05-21

## 2023-02-20 RX ORDER — GABAPENTIN 300 MG/1
CAPSULE ORAL
COMMUNITY

## 2023-02-20 RX ORDER — FLUTICASONE PROPIONATE 50 MCG
SPRAY, SUSPENSION (ML) NASAL
Qty: 48 G | OUTPATIENT
Start: 2023-02-20

## 2023-02-20 SDOH — ECONOMIC STABILITY: HOUSING INSECURITY
IN THE LAST 12 MONTHS, WAS THERE A TIME WHEN YOU DID NOT HAVE A STEADY PLACE TO SLEEP OR SLEPT IN A SHELTER (INCLUDING NOW)?: NO

## 2023-02-20 SDOH — ECONOMIC STABILITY: FOOD INSECURITY: WITHIN THE PAST 12 MONTHS, THE FOOD YOU BOUGHT JUST DIDN'T LAST AND YOU DIDN'T HAVE MONEY TO GET MORE.: NEVER TRUE

## 2023-02-20 SDOH — ECONOMIC STABILITY: FOOD INSECURITY: WITHIN THE PAST 12 MONTHS, YOU WORRIED THAT YOUR FOOD WOULD RUN OUT BEFORE YOU GOT MONEY TO BUY MORE.: NEVER TRUE

## 2023-02-20 SDOH — ECONOMIC STABILITY: INCOME INSECURITY: HOW HARD IS IT FOR YOU TO PAY FOR THE VERY BASICS LIKE FOOD, HOUSING, MEDICAL CARE, AND HEATING?: NOT HARD AT ALL

## 2023-02-20 ASSESSMENT — PATIENT HEALTH QUESTIONNAIRE - PHQ9
8. MOVING OR SPEAKING SO SLOWLY THAT OTHER PEOPLE COULD HAVE NOTICED. OR THE OPPOSITE, BEING SO FIGETY OR RESTLESS THAT YOU HAVE BEEN MOVING AROUND A LOT MORE THAN USUAL: 0
SUM OF ALL RESPONSES TO PHQ QUESTIONS 1-9: 0
SUM OF ALL RESPONSES TO PHQ QUESTIONS 1-9: 0
2. FEELING DOWN, DEPRESSED OR HOPELESS: 0
SUM OF ALL RESPONSES TO PHQ9 QUESTIONS 1 & 2: 0
SUM OF ALL RESPONSES TO PHQ QUESTIONS 1-9: 0
3. TROUBLE FALLING OR STAYING ASLEEP: 0
6. FEELING BAD ABOUT YOURSELF - OR THAT YOU ARE A FAILURE OR HAVE LET YOURSELF OR YOUR FAMILY DOWN: 0
5. POOR APPETITE OR OVEREATING: 0
7. TROUBLE CONCENTRATING ON THINGS, SUCH AS READING THE NEWSPAPER OR WATCHING TELEVISION: 0
9. THOUGHTS THAT YOU WOULD BE BETTER OFF DEAD, OR OF HURTING YOURSELF: 0
1. LITTLE INTEREST OR PLEASURE IN DOING THINGS: 0
4. FEELING TIRED OR HAVING LITTLE ENERGY: 0
SUM OF ALL RESPONSES TO PHQ QUESTIONS 1-9: 0
10. IF YOU CHECKED OFF ANY PROBLEMS, HOW DIFFICULT HAVE THESE PROBLEMS MADE IT FOR YOU TO DO YOUR WORK, TAKE CARE OF THINGS AT HOME, OR GET ALONG WITH OTHER PEOPLE: 0

## 2023-02-20 NOTE — LETTER
[facility]   [address]  Phone: [phone]  Fax: [phone]    Scot Jarrett MD         February 20, 2023     Patient: Fang Caballero   YOB: 1940   Date of Visit: 2/20/2023       To Whom It May Concern: It is my medical opinion that Axel Red requires a disability parking placard for the following reasons:  She cannot walk 200 feet without stopping to rest.  Duration of need: 5 years    If you have any questions or concerns, please don't hesitate to call.     Sincerely,        Scot Jarrett MD

## 2023-03-04 ENCOUNTER — HOSPITAL ENCOUNTER (EMERGENCY)
Age: 83
Discharge: HOME | End: 2023-03-04
Payer: MEDICARE

## 2023-03-04 VITALS
RESPIRATION RATE: 16 BRPM | BODY MASS INDEX: 31.62 KG/M2 | WEIGHT: 190 LBS | SYSTOLIC BLOOD PRESSURE: 131 MMHG | DIASTOLIC BLOOD PRESSURE: 78 MMHG | TEMPERATURE: 97.3 F | HEART RATE: 77 BPM | OXYGEN SATURATION: 96 %

## 2023-03-04 DIAGNOSIS — J06.9 ACUTE UPPER RESPIRATORY INFECTION: Primary | ICD-10-CM

## 2023-03-04 PROCEDURE — 99211 OFF/OP EST MAY X REQ PHY/QHP: CPT

## 2023-03-04 RX ORDER — AZITHROMYCIN 250 MG/1
TABLET, FILM COATED ORAL
Qty: 1 PACKET | Refills: 0 | Status: SHIPPED | OUTPATIENT
Start: 2023-03-04 | End: 2023-03-08

## 2023-03-04 NOTE — ED PROVIDER NOTES
Department of Emergency Medicine   ED  Provider Note  Admit Date/RoomTime: 3/4/2023  9:12 AM  ED Room: 02/02            Chief Complaint:  Headache, Sinusitis, and Cough      History of Present Illness:  Source of history provided by:  patient. History/Exam Limitations: none. Frederick Valdes is a [de-identified] y.o. old female presenting to the emergency department for cough, nasal/sinus congestion, rhinorrhea, sinus pressure, sore throat, which occured 1 week(s) prior to arrival.  Since onset the symptoms have been persistent. Denies chest pain, shortness of breath, difficulty swallowing, difficulty breathing, neck pain, neck stiffness, or rash. Does report sick contacts as her  is ill with similar symptoms. Does not report fever, chills and body aches. Patient denies recent travel. Patient denies all other symptoms at this time. Review of Systems:      Pertinent positives and negatives are stated within HPI, all other systems reviewed and are negative. Past Medical History:  has a past medical history of Acid reflux disease, Anxiety, Arthritis, Central retinal vein occlusion of right eye, Glaucoma, left eye, History of fracture of femur, History of trigeminal neuralgia, Hyperlipidemia, Hypertension, Hypoglycemia, Right knee pain, Symptoms consistent with irritable bowel syndrome, Use of cane as ambulatory aid, and Wears glasses. Past Surgical History:  has a past surgical history that includes Tonsillectomy; Breast lumpectomy; Cataract removal with implant (Right, 2005); other surgical history; joint replacement; Total knee arthroplasty (Bilateral, 2013); Cholecystectomy (2015); Breast biopsy (Bilateral, 2015 right , 2020 left); Revision total knee arthroplasty (Right, 7/8/2020); and US BREAST BIOPSY W LOC DEVICE 1ST LESION LEFT (Left, 8/22/2022). Social History:  reports that she has never smoked.  She has never used smokeless tobacco. She reports current alcohol use of about 7.0 standard drinks per week. She reports that she does not use drugs. Family History: family history is not on file. Allergies: Adhesive tape, Augmentin [amoxicillin-pot clavulanate], Amoxicillin, and Clavulanic acid    Physical Exam:  Vital signs reviewed. Constitutional:  Alert, development consistent with age. Well appearing and non toxic and not distressed. Ears:  TMs without perforation, injection, or bulging. External canals clear without exudate. Mouth/Throat: Airway Patent. Floor of mouth soft. no erythema or exudates noted. Teeth and gums normal..   Handling secretions, no stridor, no evidence of airway compromise, no trismus. No visible abscess or PTA. Neck:  Supple, full ROM, no asymmetry, no meningeal signs. No stridor. There is no  anterior cervical and posterior cervical node tenderness. Lungs:  Clear to auscultation and breath sounds equal.    CV: Regular rate and rhythm, normal heart sounds, without pathological murmurs, ectopy, gallops, or rubs. Skin:  Warm and dry, No rashes, no erythema present. Neurological:  GCS 15, Oriented. Motor functions intact.    -------------------Nursing Notes / Prior Records & Vitals Reviewed Section----------------------   (The nursing notes within the ED encounter, home medications, current encounter or past encounter records and vital signs as below have been reviewed)   /78   Pulse 77   Temp 97.3 °F (36.3 °C)   Resp 16   Wt 190 lb (86.2 kg)   SpO2 96%   BMI 31.62 kg/m²   Oxygen Saturation Interpretation: Normal.  -------------------------------------------Test Results Section---------------------------------------------  (All laboratory and radiology results have been personally reviewed by myself)  Laboratory:  No results found for this visit on 03/04/23. Radiology: All Radiology results interpreted by Radiologist unless otherwise noted.   No orders to display     -----------------------------ED Course / Medical Decision Making Section--------------------------  ED Course Medications:  Medications - No data to display    Medical Decision Making:   Acute URI. No evidence of PTA, RP abscess, epiglottitis, ludwigs angina, or other life threatening or deep space infection or airway compromise. No meningeal signs on physical exam or concern for meningitis at this time. No chest pain or shortness of breath. Patient is nontoxic-appearing, in no acute distress, and neurovascularly intact. Due to patient's duration of symptoms and age, will go ahead and treat with antibiotics. Advised to follow up with PCP for recheck and return to the ED with new or worsening symptoms. Patient voiced understanding and is agreeable to the above treatment plan. Counseling: The emergency provider has spoken with the patient and discussed todays results, in addition to providing specific details for the plan of care and counseling regarding the diagnosis and prognosis. Questions are answered at this time and they are agreeable with the plan.  ------------------------------------Impression & Disposition Section------------------------------------  Impression(s):  1. Acute upper respiratory infection        Disposition:  Disposition: Discharge to home  Patient condition is stable    Discharge Medication List as of 3/4/2023  9:25 AM        START taking these medications    Details   azithromycin (ZITHROMAX Z-KASSANDRA) 250 MG tablet Take 2 tablets (500 mg) on Day 1, and then take 1 tablet (250 mg) on days 2 through 5., Disp-1 packet, R-0Normal           * NOTE: This report was transcribed using voice recognition software. Every effort was made to ensure accuracy; however, inadvertent computerized transcription errors may be present.             Pauline Peck [facility]  03/04/23 [id number]

## 2023-04-07 DIAGNOSIS — I26.94 MULTIPLE SUBSEGMENTAL PULMONARY EMBOLI WITHOUT ACUTE COR PULMONALE (HCC): ICD-10-CM

## 2023-04-07 RX ORDER — APIXABAN 2.5 MG/1
TABLET, FILM COATED ORAL
Qty: 180 TABLET | Refills: 0 | Status: SHIPPED | OUTPATIENT
Start: 2023-04-07

## 2023-04-24 ASSESSMENT — ENCOUNTER SYMPTOMS
SHORTNESS OF BREATH: 0
TROUBLE SWALLOWING: 0
SINUS PAIN: 0
EYE ITCHING: 0
ABDOMINAL DISTENTION: 0
WHEEZING: 0
PHOTOPHOBIA: 0
CONSTIPATION: 0
VOMITING: 0
NAUSEA: 0
SORE THROAT: 0
BACK PAIN: 0
ABDOMINAL PAIN: 0
COUGH: 0
BLOOD IN STOOL: 0
APNEA: 0
EYE DISCHARGE: 0
DIARRHEA: 0

## 2023-05-01 RX ORDER — FLUTICASONE PROPIONATE 50 MCG
SPRAY, SUSPENSION (ML) NASAL
Qty: 16 G | Refills: 1 | Status: SHIPPED | OUTPATIENT
Start: 2023-05-01

## 2023-05-02 RX ORDER — FLUTICASONE PROPIONATE 50 MCG
SPRAY, SUSPENSION (ML) NASAL
Qty: 48 G | OUTPATIENT
Start: 2023-05-02

## 2023-05-16 DIAGNOSIS — E78.2 MIXED HYPERLIPIDEMIA: ICD-10-CM

## 2023-05-16 DIAGNOSIS — F32.9 REACTIVE DEPRESSION: ICD-10-CM

## 2023-05-16 RX ORDER — PAROXETINE 10 MG/1
10 TABLET, FILM COATED ORAL EVERY MORNING
Qty: 90 TABLET | Refills: 0 | Status: SHIPPED | OUTPATIENT
Start: 2023-05-16 | End: 2023-08-14

## 2023-05-16 RX ORDER — OMEPRAZOLE 20 MG/1
20 CAPSULE, DELAYED RELEASE ORAL
Qty: 90 CAPSULE | Refills: 0 | Status: SHIPPED | OUTPATIENT
Start: 2023-05-16 | End: 2023-08-14

## 2023-05-16 RX ORDER — LOVASTATIN 40 MG/1
40 TABLET ORAL NIGHTLY
Qty: 90 TABLET | Refills: 0 | Status: SHIPPED | OUTPATIENT
Start: 2023-05-16

## 2023-05-22 DIAGNOSIS — E78.2 MIXED HYPERLIPIDEMIA: ICD-10-CM

## 2023-05-22 LAB
ALBUMIN SERPL-MCNC: 3.9 G/DL (ref 3.5–5.2)
ALP SERPL-CCNC: 86 U/L (ref 35–104)
ALT SERPL-CCNC: 11 U/L (ref 0–32)
ANION GAP SERPL CALCULATED.3IONS-SCNC: 13 MMOL/L (ref 7–16)
AST SERPL-CCNC: 18 U/L (ref 0–31)
BILIRUB SERPL-MCNC: 0.4 MG/DL (ref 0–1.2)
BUN SERPL-MCNC: 14 MG/DL (ref 6–23)
CALCIUM SERPL-MCNC: 9.4 MG/DL (ref 8.6–10.2)
CHLORIDE SERPL-SCNC: 105 MMOL/L (ref 98–107)
CHOLESTEROL, FASTING: 206 MG/DL (ref 0–199)
CO2 SERPL-SCNC: 23 MMOL/L (ref 22–29)
CREAT SERPL-MCNC: 0.9 MG/DL (ref 0.5–1)
GLUCOSE SERPL-MCNC: 111 MG/DL (ref 74–99)
HDLC SERPL-MCNC: 73 MG/DL
LDL CHOLESTEROL CALCULATED: 109 MG/DL (ref 0–99)
POTASSIUM SERPL-SCNC: 3.9 MMOL/L (ref 3.5–5)
PROT SERPL-MCNC: 6.9 G/DL (ref 6.4–8.3)
SODIUM SERPL-SCNC: 141 MMOL/L (ref 132–146)
TRIGLYCERIDE, FASTING: 119 MG/DL (ref 0–149)
VLDLC SERPL CALC-MCNC: 24 MG/DL

## 2023-05-23 ENCOUNTER — OFFICE VISIT (OUTPATIENT)
Dept: PRIMARY CARE CLINIC | Age: 83
End: 2023-05-23
Payer: MEDICARE

## 2023-05-23 VITALS
WEIGHT: 205 LBS | DIASTOLIC BLOOD PRESSURE: 72 MMHG | TEMPERATURE: 98.6 F | BODY MASS INDEX: 34.16 KG/M2 | SYSTOLIC BLOOD PRESSURE: 138 MMHG | OXYGEN SATURATION: 95 % | HEART RATE: 92 BPM | HEIGHT: 65 IN

## 2023-05-23 DIAGNOSIS — F32.9 REACTIVE DEPRESSION: ICD-10-CM

## 2023-05-23 DIAGNOSIS — E78.2 MIXED HYPERLIPIDEMIA: Primary | ICD-10-CM

## 2023-05-23 DIAGNOSIS — R73.01 IMPAIRED FASTING GLUCOSE: ICD-10-CM

## 2023-05-23 DIAGNOSIS — I26.94 MULTIPLE SUBSEGMENTAL PULMONARY EMBOLI WITHOUT ACUTE COR PULMONALE (HCC): ICD-10-CM

## 2023-05-23 PROCEDURE — 1123F ACP DISCUSS/DSCN MKR DOCD: CPT | Performed by: INTERNAL MEDICINE

## 2023-05-23 PROCEDURE — 99214 OFFICE O/P EST MOD 30 MIN: CPT | Performed by: INTERNAL MEDICINE

## 2023-06-18 ENCOUNTER — HOSPITAL ENCOUNTER (EMERGENCY)
Age: 83
Discharge: HOME | End: 2023-06-18
Payer: MEDICARE

## 2023-06-18 VITALS
TEMPERATURE: 97.7 F | BODY MASS INDEX: 33.28 KG/M2 | WEIGHT: 200 LBS | DIASTOLIC BLOOD PRESSURE: 71 MMHG | SYSTOLIC BLOOD PRESSURE: 144 MMHG | RESPIRATION RATE: 20 BRPM | HEART RATE: 82 BPM | OXYGEN SATURATION: 97 %

## 2023-06-18 DIAGNOSIS — N30.00 ACUTE CYSTITIS WITHOUT HEMATURIA: Primary | ICD-10-CM

## 2023-06-18 LAB
REASON FOR REJECTION: NORMAL
REJECTED TEST: NORMAL

## 2023-06-18 PROCEDURE — 99211 OFF/OP EST MAY X REQ PHY/QHP: CPT

## 2023-06-18 PROCEDURE — 87088 URINE BACTERIA CULTURE: CPT

## 2023-06-18 RX ORDER — CEPHALEXIN 500 MG/1
500 CAPSULE ORAL 4 TIMES DAILY
Qty: 28 CAPSULE | Refills: 0 | Status: SHIPPED | OUTPATIENT
Start: 2023-06-18 | End: 2023-06-24

## 2023-06-18 ASSESSMENT — PAIN - FUNCTIONAL ASSESSMENT
PAIN_FUNCTIONAL_ASSESSMENT: 0-10
PAIN_FUNCTIONAL_ASSESSMENT: NONE - DENIES PAIN

## 2023-06-18 ASSESSMENT — PAIN SCALES - GENERAL: PAINLEVEL_OUTOF10: 0

## 2023-06-20 LAB — BACTERIA UR CULT: NORMAL

## 2023-06-24 ENCOUNTER — APPOINTMENT (OUTPATIENT)
Dept: ULTRASOUND IMAGING | Age: 83
DRG: 087 | End: 2023-06-24
Payer: MEDICARE

## 2023-06-24 ENCOUNTER — HOSPITAL ENCOUNTER (EMERGENCY)
Age: 83
Discharge: ANOTHER HOSPITAL | End: 2023-06-24
Attending: EMERGENCY MEDICINE
Payer: MEDICARE

## 2023-06-24 ENCOUNTER — HOSPITAL ENCOUNTER (INPATIENT)
Age: 83
LOS: 1 days | Discharge: HOME | DRG: 087 | End: 2023-06-25
Attending: EMERGENCY MEDICINE | Admitting: SURGERY
Payer: MEDICARE

## 2023-06-24 ENCOUNTER — APPOINTMENT (OUTPATIENT)
Dept: CT IMAGING | Age: 83
DRG: 087 | End: 2023-06-24
Payer: MEDICARE

## 2023-06-24 ENCOUNTER — APPOINTMENT (OUTPATIENT)
Dept: CT IMAGING | Age: 83
End: 2023-06-24
Payer: MEDICARE

## 2023-06-24 VITALS
SYSTOLIC BLOOD PRESSURE: 196 MMHG | RESPIRATION RATE: 15 BRPM | OXYGEN SATURATION: 96 % | DIASTOLIC BLOOD PRESSURE: 98 MMHG | HEART RATE: 72 BPM | TEMPERATURE: 97.9 F

## 2023-06-24 DIAGNOSIS — Z79.01 ANTICOAGULATED: ICD-10-CM

## 2023-06-24 DIAGNOSIS — S06.5XAA SDH (SUBDURAL HEMATOMA) (HCC): Primary | ICD-10-CM

## 2023-06-24 DIAGNOSIS — S06.5XAA SUBDURAL HEMATOMA (HCC): Primary | ICD-10-CM

## 2023-06-24 LAB
ALBUMIN SERPL-MCNC: 4.3 G/DL (ref 3.5–5.2)
ALP SERPL-CCNC: 98 U/L (ref 35–104)
ALT SERPL-CCNC: 11 U/L (ref 0–32)
ANION GAP SERPL CALCULATED.3IONS-SCNC: 9 MMOL/L (ref 7–16)
AST SERPL-CCNC: 16 U/L (ref 0–31)
BILIRUB SERPL-MCNC: 0.4 MG/DL (ref 0–1.2)
BUN SERPL-MCNC: 8 MG/DL (ref 6–23)
CALCIUM SERPL-MCNC: 9.7 MG/DL (ref 8.6–10.2)
CHLORIDE SERPL-SCNC: 102 MMOL/L (ref 98–107)
CO2 SERPL-SCNC: 28 MMOL/L (ref 22–29)
CREAT SERPL-MCNC: 0.8 MG/DL (ref 0.5–1)
ERYTHROCYTE [DISTWIDTH] IN BLOOD BY AUTOMATED COUNT: 14.4 FL (ref 11.5–15)
GLUCOSE SERPL-MCNC: 121 MG/DL (ref 74–99)
HCT VFR BLD AUTO: 39 % (ref 34–48)
HGB BLD-MCNC: 12.3 G/DL (ref 11.5–15.5)
MCH RBC QN AUTO: 27.7 PG (ref 26–35)
MCHC RBC AUTO-ENTMCNC: 31.5 % (ref 32–34.5)
MCV RBC AUTO: 87.8 FL (ref 80–99.9)
PLATELET # BLD AUTO: 217 E9/L (ref 130–450)
PMV BLD AUTO: 9.6 FL (ref 7–12)
POTASSIUM SERPL-SCNC: 4.1 MMOL/L (ref 3.5–5)
PROT SERPL-MCNC: 7.3 G/DL (ref 6.4–8.3)
RBC # BLD AUTO: 4.44 E12/L (ref 3.5–5.5)
SODIUM SERPL-SCNC: 139 MMOL/L (ref 132–146)
WBC # BLD: 6.4 E9/L (ref 4.5–11.5)

## 2023-06-24 PROCEDURE — 72125 CT NECK SPINE W/O DYE: CPT

## 2023-06-24 PROCEDURE — 71260 CT THORAX DX C+: CPT

## 2023-06-24 PROCEDURE — 6360000002 HC RX W HCPCS

## 2023-06-24 PROCEDURE — 96374 THER/PROPH/DIAG INJ IV PUSH: CPT

## 2023-06-24 PROCEDURE — 6370000000 HC RX 637 (ALT 250 FOR IP)

## 2023-06-24 PROCEDURE — 80053 COMPREHEN METABOLIC PANEL: CPT

## 2023-06-24 PROCEDURE — 6360000004 HC RX CONTRAST MEDICATION: Performed by: RADIOLOGY

## 2023-06-24 PROCEDURE — 85027 COMPLETE CBC AUTOMATED: CPT

## 2023-06-24 PROCEDURE — 93970 EXTREMITY STUDY: CPT

## 2023-06-24 PROCEDURE — 74177 CT ABD & PELVIS W/CONTRAST: CPT

## 2023-06-24 PROCEDURE — 2500000003 HC RX 250 WO HCPCS: Performed by: EMERGENCY MEDICINE

## 2023-06-24 PROCEDURE — 99223 1ST HOSP IP/OBS HIGH 75: CPT | Performed by: SURGERY

## 2023-06-24 PROCEDURE — 2060000000 HC ICU INTERMEDIATE R&B

## 2023-06-24 PROCEDURE — 70450 CT HEAD/BRAIN W/O DYE: CPT

## 2023-06-24 PROCEDURE — 99285 EMERGENCY DEPT VISIT HI MDM: CPT

## 2023-06-24 RX ORDER — LABETALOL HYDROCHLORIDE 5 MG/ML
10 INJECTION, SOLUTION INTRAVENOUS
Status: DISCONTINUED | OUTPATIENT
Start: 2023-06-24 | End: 2023-06-25

## 2023-06-24 RX ORDER — ONDANSETRON 4 MG/1
4 TABLET, ORALLY DISINTEGRATING ORAL EVERY 8 HOURS PRN
Status: DISCONTINUED | OUTPATIENT
Start: 2023-06-24 | End: 2023-06-25 | Stop reason: HOSPADM

## 2023-06-24 RX ORDER — SODIUM CHLORIDE 0.9 % (FLUSH) 0.9 %
5-40 SYRINGE (ML) INJECTION PRN
Status: DISCONTINUED | OUTPATIENT
Start: 2023-06-24 | End: 2023-06-25 | Stop reason: HOSPADM

## 2023-06-24 RX ORDER — ONDANSETRON 2 MG/ML
4 INJECTION INTRAMUSCULAR; INTRAVENOUS EVERY 6 HOURS PRN
Status: DISCONTINUED | OUTPATIENT
Start: 2023-06-24 | End: 2023-06-25 | Stop reason: HOSPADM

## 2023-06-24 RX ORDER — LEVETIRACETAM 500 MG/5ML
500 INJECTION, SOLUTION, CONCENTRATE INTRAVENOUS EVERY 12 HOURS
Status: DISCONTINUED | OUTPATIENT
Start: 2023-06-24 | End: 2023-06-25

## 2023-06-24 RX ORDER — LABETALOL HYDROCHLORIDE 5 MG/ML
5 INJECTION, SOLUTION INTRAVENOUS ONCE
Status: COMPLETED | OUTPATIENT
Start: 2023-06-24 | End: 2023-06-24

## 2023-06-24 RX ORDER — SODIUM CHLORIDE 9 MG/ML
50 INJECTION, SOLUTION INTRAVENOUS ONCE
Status: DISCONTINUED | OUTPATIENT
Start: 2023-06-24 | End: 2023-06-24

## 2023-06-24 RX ORDER — POLYETHYLENE GLYCOL 3350 17 G/17G
17 POWDER, FOR SOLUTION ORAL DAILY PRN
Status: DISCONTINUED | OUTPATIENT
Start: 2023-06-24 | End: 2023-06-25 | Stop reason: HOSPADM

## 2023-06-24 RX ORDER — CEPHALEXIN 500 MG/1
500 CAPSULE ORAL 4 TIMES DAILY
COMMUNITY
Start: 2023-06-18 | End: 2023-06-25

## 2023-06-24 RX ORDER — HYDRALAZINE HYDROCHLORIDE 20 MG/ML
10 INJECTION INTRAMUSCULAR; INTRAVENOUS
Status: DISCONTINUED | OUTPATIENT
Start: 2023-06-24 | End: 2023-06-25

## 2023-06-24 RX ORDER — ACETAMINOPHEN 650 MG/1
650 SUPPOSITORY RECTAL EVERY 6 HOURS PRN
Status: DISCONTINUED | OUTPATIENT
Start: 2023-06-24 | End: 2023-06-25 | Stop reason: HOSPADM

## 2023-06-24 RX ORDER — SODIUM CHLORIDE 9 MG/ML
INJECTION, SOLUTION INTRAVENOUS PRN
Status: DISCONTINUED | OUTPATIENT
Start: 2023-06-24 | End: 2023-06-25 | Stop reason: HOSPADM

## 2023-06-24 RX ORDER — FLUTICASONE PROPIONATE 50 MCG
2 SPRAY, SUSPENSION (ML) NASAL DAILY
COMMUNITY
End: 2023-06-26 | Stop reason: SDUPTHER

## 2023-06-24 RX ORDER — SODIUM CHLORIDE 0.9 % (FLUSH) 0.9 %
5-40 SYRINGE (ML) INJECTION EVERY 12 HOURS SCHEDULED
Status: DISCONTINUED | OUTPATIENT
Start: 2023-06-24 | End: 2023-06-25 | Stop reason: HOSPADM

## 2023-06-24 RX ORDER — ACETAMINOPHEN 325 MG/1
650 TABLET ORAL EVERY 6 HOURS PRN
Status: DISCONTINUED | OUTPATIENT
Start: 2023-06-24 | End: 2023-06-25 | Stop reason: HOSPADM

## 2023-06-24 RX ADMIN — IOPAMIDOL 75 ML: 755 INJECTION, SOLUTION INTRAVENOUS at 12:15

## 2023-06-24 RX ADMIN — LEVETIRACETAM 500 MG: 100 INJECTION INTRAVENOUS at 17:07

## 2023-06-24 RX ADMIN — ONDANSETRON 4 MG: 2 INJECTION INTRAMUSCULAR; INTRAVENOUS at 20:43

## 2023-06-24 RX ADMIN — HYDRALAZINE HYDROCHLORIDE 10 MG: 20 INJECTION INTRAMUSCULAR; INTRAVENOUS at 17:16

## 2023-06-24 RX ADMIN — HYDRALAZINE HYDROCHLORIDE 10 MG: 20 INJECTION INTRAMUSCULAR; INTRAVENOUS at 20:46

## 2023-06-24 RX ADMIN — LABETALOL HYDROCHLORIDE 5 MG: 5 INJECTION INTRAVENOUS at 14:32

## 2023-06-24 RX ADMIN — ACETAMINOPHEN 650 MG: 325 TABLET ORAL at 17:20

## 2023-06-24 ASSESSMENT — PAIN - FUNCTIONAL ASSESSMENT
PAIN_FUNCTIONAL_ASSESSMENT: NONE - DENIES PAIN
PAIN_FUNCTIONAL_ASSESSMENT: 0-10

## 2023-06-24 ASSESSMENT — PAIN SCALES - GENERAL
PAINLEVEL_OUTOF10: 6
PAINLEVEL_OUTOF10: 4

## 2023-06-24 ASSESSMENT — ENCOUNTER SYMPTOMS
SHORTNESS OF BREATH: 0
CHEST TIGHTNESS: 0

## 2023-06-24 ASSESSMENT — PAIN DESCRIPTION - LOCATION: LOCATION: HEAD

## 2023-06-24 NOTE — ED PROVIDER NOTES
CONTRAST   Final Result   1. Hyperdense acute hematoma extending from the frontal to the occipital   region along the left side of the falx measuring up to 6.5 mm in thickness. It is possible that might be a small component on the right-side falx as well. 2.  Follow-up study in few hours time interval recommended for baseline   determination. Preliminary report given to Dr. Pj Coreas, ER physician at the time of the   interpretation. CT CERVICAL SPINE WO CONTRAST   Final Result   No acute abnormality of the cervical spine. No significant interval changes since the study of July 17, 2022. CT CHEST W CONTRAST   Final Result   1. No evidence of pneumothorax or pleural fluid. No evidence of mediastinal   hematoma. 2.  Moderate hiatal hernia. 3.  Osteoporosis and accentuation of normal thoracic kyphosis. No acute   osseous abnormality. CT ABDOMEN PELVIS W IV CONTRAST Additional Contrast? None   Final Result   1. No evidence of abdominal visceral injury. 2.  No free air free fluid. 3.  Moderate size paraesophageal hernia without evidence of obstruction. The   hernia is new compared to 10/11/2013. .      4.  Fecal retention compatible with constipation. Sigmoid diverticulosis   without diverticulitis.               ------------------------- NURSING NOTES AND VITALS REVIEWED ---------------------------  Date / Time Roomed:  6/24/2023 10:31 AM  ED Bed Assignment:  MEHRDAD/MEHRDAD    The nursing notes within the ED encounter and vital signs as below have been reviewed. No data found. Oxygen Saturation Interpretation: Normal      ------------------------------------------ PROGRESS NOTES ------------------------------------------  Re-evaluation(s):  Time: 1400.   Patients symptoms show no change  Repeat physical examination is not changed  I have spoken with the patient and spoue  and discussed todays results, in addition to providing specific details for the plan

## 2023-06-24 NOTE — ED NOTES
Name: Pola Bullock  : 1940  MRN: [id number]    Date: 2023    Benefits of immediately proceeding with Radiology exam outweigh the risks and therefore the following is being waived:      [] Pregnancy test    [] Protocol for Iodine allergy    [] MRI questionnaire    [x] BUN/Creatinine        DO Gayathri Leo DO  23 [id number]

## 2023-06-24 NOTE — ED NOTES
Report given physician  ambulance for transfer. [facility] spoke with Tiffani Connors nurse to nurse. No further questions at this time.       Lalit Holder RN  06/24/23 [id number]

## 2023-06-25 VITALS
TEMPERATURE: 98.4 F | SYSTOLIC BLOOD PRESSURE: 105 MMHG | RESPIRATION RATE: 18 BRPM | OXYGEN SATURATION: 95 % | HEART RATE: 85 BPM | HEIGHT: 65 IN | WEIGHT: 200 LBS | BODY MASS INDEX: 33.32 KG/M2 | DIASTOLIC BLOOD PRESSURE: 64 MMHG

## 2023-06-25 PROBLEM — S06.5XAA SDH (SUBDURAL HEMATOMA) (HCC): Status: ACTIVE | Noted: 2023-06-25

## 2023-06-25 LAB
ALBUMIN SERPL-MCNC: 4.2 G/DL (ref 3.5–5.2)
ALP SERPL-CCNC: 96 U/L (ref 35–104)
ALT SERPL-CCNC: 16 U/L (ref 0–32)
ANION GAP SERPL CALCULATED.3IONS-SCNC: 11 MMOL/L (ref 7–16)
AST SERPL-CCNC: 22 U/L (ref 0–31)
BILIRUB SERPL-MCNC: 0.4 MG/DL (ref 0–1.2)
BUN SERPL-MCNC: 11 MG/DL (ref 6–23)
CALCIUM SERPL-MCNC: 9.7 MG/DL (ref 8.6–10.2)
CHLORIDE SERPL-SCNC: 102 MMOL/L (ref 98–107)
CO2 SERPL-SCNC: 25 MMOL/L (ref 22–29)
CREAT SERPL-MCNC: 0.8 MG/DL (ref 0.5–1)
ERYTHROCYTE [DISTWIDTH] IN BLOOD BY AUTOMATED COUNT: 14.8 FL (ref 11.5–15)
GLUCOSE SERPL-MCNC: 139 MG/DL (ref 74–99)
HCT VFR BLD AUTO: 42.1 % (ref 34–48)
HGB BLD-MCNC: 12.7 G/DL (ref 11.5–15.5)
MCH RBC QN AUTO: 27 PG (ref 26–35)
MCHC RBC AUTO-ENTMCNC: 30.2 % (ref 32–34.5)
MCV RBC AUTO: 89.6 FL (ref 80–99.9)
PLATELET # BLD AUTO: 242 E9/L (ref 130–450)
PMV BLD AUTO: 10.2 FL (ref 7–12)
POTASSIUM SERPL-SCNC: 4 MMOL/L (ref 3.5–5)
PROT SERPL-MCNC: 7.3 G/DL (ref 6.4–8.3)
RBC # BLD AUTO: 4.7 E12/L (ref 3.5–5.5)
SODIUM SERPL-SCNC: 138 MMOL/L (ref 132–146)
WBC # BLD: 6.3 E9/L (ref 4.5–11.5)

## 2023-06-25 PROCEDURE — 2500000003 HC RX 250 WO HCPCS

## 2023-06-25 PROCEDURE — 80053 COMPREHEN METABOLIC PANEL: CPT

## 2023-06-25 PROCEDURE — 36415 COLL VENOUS BLD VENIPUNCTURE: CPT

## 2023-06-25 PROCEDURE — 97161 PT EVAL LOW COMPLEX 20 MIN: CPT

## 2023-06-25 PROCEDURE — 6370000000 HC RX 637 (ALT 250 FOR IP)

## 2023-06-25 PROCEDURE — 2580000003 HC RX 258

## 2023-06-25 PROCEDURE — 6360000002 HC RX W HCPCS

## 2023-06-25 PROCEDURE — 97530 THERAPEUTIC ACTIVITIES: CPT

## 2023-06-25 PROCEDURE — 99222 1ST HOSP IP/OBS MODERATE 55: CPT | Performed by: NEUROLOGICAL SURGERY

## 2023-06-25 PROCEDURE — 85027 COMPLETE CBC AUTOMATED: CPT

## 2023-06-25 RX ORDER — PAROXETINE 10 MG/1
10 TABLET, FILM COATED ORAL EVERY MORNING
Status: DISCONTINUED | OUTPATIENT
Start: 2023-06-25 | End: 2023-06-25 | Stop reason: HOSPADM

## 2023-06-25 RX ORDER — BUTALBITAL, ACETAMINOPHEN AND CAFFEINE 50; 325; 40 MG/1; MG/1; MG/1
1 TABLET ORAL EVERY 8 HOURS PRN
Status: DISCONTINUED | OUTPATIENT
Start: 2023-06-25 | End: 2023-06-25

## 2023-06-25 RX ORDER — BUTALBITAL, ACETAMINOPHEN AND CAFFEINE 50; 325; 40 MG/1; MG/1; MG/1
1 TABLET ORAL EVERY 6 HOURS PRN
Status: DISCONTINUED | OUTPATIENT
Start: 2023-06-25 | End: 2023-06-25 | Stop reason: HOSPADM

## 2023-06-25 RX ORDER — OXYCODONE HYDROCHLORIDE 5 MG/1
5 TABLET ORAL EVERY 4 HOURS PRN
Status: DISCONTINUED | OUTPATIENT
Start: 2023-06-25 | End: 2023-06-25

## 2023-06-25 RX ORDER — LEVETIRACETAM 500 MG/1
500 TABLET ORAL 2 TIMES DAILY
Status: DISCONTINUED | OUTPATIENT
Start: 2023-06-25 | End: 2023-06-25 | Stop reason: HOSPADM

## 2023-06-25 RX ORDER — ATORVASTATIN CALCIUM 10 MG/1
10 TABLET, FILM COATED ORAL DAILY
Status: DISCONTINUED | OUTPATIENT
Start: 2023-06-25 | End: 2023-06-25 | Stop reason: HOSPADM

## 2023-06-25 RX ORDER — HYDRALAZINE HYDROCHLORIDE 20 MG/ML
10 INJECTION INTRAMUSCULAR; INTRAVENOUS
Status: DISCONTINUED | OUTPATIENT
Start: 2023-06-25 | End: 2023-06-25 | Stop reason: HOSPADM

## 2023-06-25 RX ORDER — OXYCODONE HYDROCHLORIDE 5 MG/1
2.5 TABLET ORAL EVERY 4 HOURS PRN
Status: DISCONTINUED | OUTPATIENT
Start: 2023-06-25 | End: 2023-06-25

## 2023-06-25 RX ORDER — BUTALBITAL, ACETAMINOPHEN AND CAFFEINE 50; 325; 40 MG/1; MG/1; MG/1
2 TABLET ORAL EVERY 8 HOURS PRN
Status: DISCONTINUED | OUTPATIENT
Start: 2023-06-25 | End: 2023-06-25

## 2023-06-25 RX ORDER — BUTALBITAL, ACETAMINOPHEN AND CAFFEINE 50; 325; 40 MG/1; MG/1; MG/1
1 TABLET ORAL EVERY 6 HOURS PRN
Qty: 20 TABLET | Refills: 1 | Status: SHIPPED | OUTPATIENT
Start: 2023-06-25

## 2023-06-25 RX ORDER — LABETALOL HYDROCHLORIDE 5 MG/ML
10 INJECTION, SOLUTION INTRAVENOUS
Status: DISCONTINUED | OUTPATIENT
Start: 2023-06-25 | End: 2023-06-25 | Stop reason: HOSPADM

## 2023-06-25 RX ORDER — LEVETIRACETAM 500 MG/1
500 TABLET ORAL 2 TIMES DAILY
Qty: 12 TABLET | Refills: 0 | Status: SHIPPED | OUTPATIENT
Start: 2023-06-25 | End: 2023-07-01

## 2023-06-25 RX ADMIN — HYDRALAZINE HYDROCHLORIDE 10 MG: 20 INJECTION INTRAMUSCULAR; INTRAVENOUS at 02:45

## 2023-06-25 RX ADMIN — ACETAMINOPHEN 650 MG: 325 TABLET ORAL at 00:05

## 2023-06-25 RX ADMIN — ATORVASTATIN CALCIUM 10 MG: 10 TABLET, FILM COATED ORAL at 08:54

## 2023-06-25 RX ADMIN — LEVETIRACETAM 500 MG: 500 TABLET, FILM COATED ORAL at 08:54

## 2023-06-25 RX ADMIN — PAROXETINE 10 MG: 10 TABLET, FILM COATED ORAL at 08:54

## 2023-06-25 RX ADMIN — LABETALOL HYDROCHLORIDE 10 MG: 5 INJECTION INTRAVENOUS at 01:01

## 2023-06-25 RX ADMIN — LEVETIRACETAM 500 MG: 100 INJECTION INTRAVENOUS at 05:06

## 2023-06-25 RX ADMIN — SODIUM CHLORIDE, PRESERVATIVE FREE 10 ML: 5 INJECTION INTRAVENOUS at 08:55

## 2023-06-25 RX ADMIN — ONDANSETRON 4 MG: 2 INJECTION INTRAMUSCULAR; INTRAVENOUS at 02:45

## 2023-06-25 ASSESSMENT — ENCOUNTER SYMPTOMS
GASTROINTESTINAL NEGATIVE: 1
BLOOD IN STOOL: 0
ABDOMINAL PAIN: 0
DIARRHEA: 0
RESPIRATORY NEGATIVE: 1
ABDOMINAL DISTENTION: 0
ALLERGIC/IMMUNOLOGIC NEGATIVE: 1
BACK PAIN: 0
VOMITING: 0
COUGH: 0
ANAL BLEEDING: 0
CONSTIPATION: 0
NAUSEA: 0
EYES NEGATIVE: 1
SHORTNESS OF BREATH: 0

## 2023-06-25 ASSESSMENT — LIFESTYLE VARIABLES
HOW OFTEN DO YOU HAVE A DRINK CONTAINING ALCOHOL: 2-3 TIMES A WEEK
HOW MANY STANDARD DRINKS CONTAINING ALCOHOL DO YOU HAVE ON A TYPICAL DAY: 1 OR 2

## 2023-06-25 ASSESSMENT — PAIN SCALES - GENERAL
PAINLEVEL_OUTOF10: 0
PAINLEVEL_OUTOF10: 8
PAINLEVEL_OUTOF10: 0
PAINLEVEL_OUTOF10: 1

## 2023-06-25 ASSESSMENT — PAIN DESCRIPTION - DESCRIPTORS: DESCRIPTORS: ACHING;STABBING

## 2023-06-25 ASSESSMENT — PAIN DESCRIPTION - ORIENTATION: ORIENTATION: MID

## 2023-06-25 ASSESSMENT — PAIN DESCRIPTION - LOCATION: LOCATION: HEAD

## 2023-06-26 ENCOUNTER — TELEPHONE (OUTPATIENT)
Dept: PRIMARY CARE CLINIC | Age: 83
End: 2023-06-26

## 2023-06-26 RX ORDER — LATANOPROST 50 UG/ML
1 SOLUTION/ DROPS OPHTHALMIC NIGHTLY
Qty: 7.5 ML | Refills: 0 | Status: SHIPPED | OUTPATIENT
Start: 2023-06-26

## 2023-06-26 RX ORDER — FLUTICASONE PROPIONATE 50 MCG
2 SPRAY, SUSPENSION (ML) NASAL DAILY
Qty: 16 G | Refills: 0 | Status: SHIPPED | OUTPATIENT
Start: 2023-06-26

## 2023-06-27 DIAGNOSIS — S06.5XAA SUBDURAL HEMATOMA (HCC): Primary | ICD-10-CM

## 2023-06-27 RX ORDER — FLUTICASONE PROPIONATE 50 MCG
SPRAY, SUSPENSION (ML) NASAL
Qty: 48 G | OUTPATIENT
Start: 2023-06-27

## 2023-06-28 RX ORDER — LATANOPROST 50 UG/ML
SOLUTION/ DROPS OPHTHALMIC
Qty: 7.5 ML | Refills: 0 | OUTPATIENT
Start: 2023-06-28

## 2023-06-29 ENCOUNTER — OFFICE VISIT (OUTPATIENT)
Dept: PRIMARY CARE CLINIC | Age: 83
End: 2023-06-29

## 2023-06-29 VITALS
BODY MASS INDEX: 33.49 KG/M2 | TEMPERATURE: 97.7 F | SYSTOLIC BLOOD PRESSURE: 122 MMHG | HEART RATE: 83 BPM | WEIGHT: 201 LBS | DIASTOLIC BLOOD PRESSURE: 74 MMHG | HEIGHT: 65 IN | OXYGEN SATURATION: 95 %

## 2023-06-29 DIAGNOSIS — M54.50 ACUTE MIDLINE LOW BACK PAIN WITHOUT SCIATICA: ICD-10-CM

## 2023-06-29 DIAGNOSIS — Z09 HOSPITAL DISCHARGE FOLLOW-UP: Primary | ICD-10-CM

## 2023-06-29 DIAGNOSIS — S06.5XAA SUBDURAL HEMATOMA (HCC): ICD-10-CM

## 2023-06-29 RX ORDER — TRAMADOL HYDROCHLORIDE 50 MG/1
50 TABLET ORAL EVERY 4 HOURS PRN
Qty: 30 TABLET | Refills: 0 | Status: SHIPPED | OUTPATIENT
Start: 2023-06-29 | End: 2023-07-04

## 2023-07-13 LAB — DIABETIC RETINOPATHY: NEGATIVE

## 2023-07-25 ENCOUNTER — HOSPITAL ENCOUNTER (OUTPATIENT)
Dept: CT IMAGING | Age: 83
Discharge: HOME | End: 2023-07-27
Attending: NEUROLOGICAL SURGERY
Payer: MEDICARE

## 2023-07-25 ENCOUNTER — OFFICE VISIT (OUTPATIENT)
Dept: NEUROSURGERY | Age: 83
End: 2023-07-25
Payer: MEDICARE

## 2023-07-25 DIAGNOSIS — S06.5XAA SUBDURAL HEMATOMA (HCC): ICD-10-CM

## 2023-07-25 DIAGNOSIS — S06.5XAA SDH (SUBDURAL HEMATOMA) (HCC): Primary | ICD-10-CM

## 2023-07-25 PROCEDURE — 1123F ACP DISCUSS/DSCN MKR DOCD: CPT | Performed by: STUDENT IN AN ORGANIZED HEALTH CARE EDUCATION/TRAINING PROGRAM

## 2023-07-25 PROCEDURE — 70450 CT HEAD/BRAIN W/O DYE: CPT

## 2023-07-25 PROCEDURE — 99212 OFFICE O/P EST SF 10 MIN: CPT

## 2023-07-25 PROCEDURE — 99213 OFFICE O/P EST LOW 20 MIN: CPT | Performed by: STUDENT IN AN ORGANIZED HEALTH CARE EDUCATION/TRAINING PROGRAM

## 2023-07-25 NOTE — PROGRESS NOTES
Hospital Follow-up     This is a 82year old female who presents to the office for a 1 month follow-up s/p SDH     Subjective: Patient states she is doing well. She denies any headaches or dizziness. No numbness or weakness. No other complaints. Head CT reviewed with patient. Physical Exam:              WDWN, no apparent distress              Non-labored breathing               Vitals Stable              Alert and oriented x3              CN 3-12 intact              PERRL              EOMI              ROBERSON well              Motor strength symmetric              Sensation to LT intact bilaterally   (-)Drift             Imagin2023 CT Head   No acute intracranial hemorrhage noted-final read pending. Assessment: This is a 82 y.o.  female presenting for a 1 month follow-up s/p SDH. Plan:  -Pain control and expectations discussed  -No restrictions - okay to resume Elliquis   -OARRS report reviewed   -Follow-up in neurosurgery clinic prn  -Call or return to neurosurgery office sooner if symptoms worsen or if new issues arise in the interim.     Electronically signed by Paras Richardson PA-C on 2023 at 4:56 PM

## 2023-08-10 LAB — DIABETIC RETINOPATHY: NEGATIVE

## 2023-08-14 DIAGNOSIS — F32.9 REACTIVE DEPRESSION: ICD-10-CM

## 2023-08-14 RX ORDER — OMEPRAZOLE 20 MG/1
CAPSULE, DELAYED RELEASE ORAL
Qty: 90 CAPSULE | Refills: 0 | Status: SHIPPED | OUTPATIENT
Start: 2023-08-14

## 2023-08-14 RX ORDER — PAROXETINE 10 MG/1
10 TABLET, FILM COATED ORAL EVERY MORNING
Qty: 90 TABLET | Refills: 0 | Status: SHIPPED | OUTPATIENT
Start: 2023-08-14 | End: 2023-11-12

## 2023-08-18 DIAGNOSIS — E78.2 MIXED HYPERLIPIDEMIA: ICD-10-CM

## 2023-08-18 RX ORDER — LOVASTATIN 40 MG/1
TABLET ORAL
Qty: 90 TABLET | Refills: 0 | Status: SHIPPED | OUTPATIENT
Start: 2023-08-18

## 2023-08-24 DIAGNOSIS — R73.01 IMPAIRED FASTING GLUCOSE: ICD-10-CM

## 2023-08-24 DIAGNOSIS — E78.2 MIXED HYPERLIPIDEMIA: ICD-10-CM

## 2023-08-24 DIAGNOSIS — I26.94 MULTIPLE SUBSEGMENTAL PULMONARY EMBOLI WITHOUT ACUTE COR PULMONALE (HCC): ICD-10-CM

## 2023-08-24 LAB
ABSOLUTE IMMATURE GRANULOCYTE: <0.03 K/UL (ref 0–0.58)
ALBUMIN SERPL-MCNC: 4.1 G/DL (ref 3.5–5.2)
ALP BLD-CCNC: 98 U/L (ref 35–104)
ALT SERPL-CCNC: 14 U/L (ref 0–32)
ANION GAP SERPL CALCULATED.3IONS-SCNC: 19 MMOL/L (ref 7–16)
AST SERPL-CCNC: 18 U/L (ref 0–31)
BASOPHILS ABSOLUTE: 0.02 K/UL (ref 0–0.2)
BASOPHILS RELATIVE PERCENT: 1 % (ref 0–2)
BILIRUB SERPL-MCNC: 0.3 MG/DL (ref 0–1.2)
BUN BLDV-MCNC: 15 MG/DL (ref 6–23)
CALCIUM SERPL-MCNC: 9.7 MG/DL (ref 8.6–10.2)
CHLORIDE BLD-SCNC: 106 MMOL/L (ref 98–107)
CHOLESTEROL, FASTING: 197 MG/DL
CO2: 21 MMOL/L (ref 22–29)
CREAT SERPL-MCNC: 0.9 MG/DL (ref 0.5–1)
EOSINOPHILS ABSOLUTE: 0.16 K/UL (ref 0.05–0.5)
EOSINOPHILS RELATIVE PERCENT: 4 % (ref 0–6)
GFR SERPL CREATININE-BSD FRML MDRD: >60 ML/MIN/1.73M2
GLUCOSE BLD-MCNC: 99 MG/DL (ref 74–99)
HBA1C MFR BLD: 5.9 % (ref 4–5.6)
HCT VFR BLD CALC: 39.7 % (ref 34–48)
HDLC SERPL-MCNC: 65 MG/DL
HEMOGLOBIN: 12.7 G/DL (ref 11.5–15.5)
IMMATURE GRANULOCYTES: 0 % (ref 0–5)
LDL CHOLESTEROL: 108 MG/DL
LYMPHOCYTES ABSOLUTE: 0.99 K/UL (ref 1.5–4)
LYMPHOCYTES RELATIVE PERCENT: 27 % (ref 20–42)
MCH RBC QN AUTO: 28.7 PG (ref 26–35)
MCHC RBC AUTO-ENTMCNC: 32 G/DL (ref 32–34.5)
MCV RBC AUTO: 89.6 FL (ref 80–99.9)
MONOCYTES ABSOLUTE: 0.42 K/UL (ref 0.1–0.95)
MONOCYTES RELATIVE PERCENT: 11 % (ref 2–12)
NEUTROPHILS ABSOLUTE: 2.11 K/UL (ref 1.8–7.3)
NEUTROPHILS RELATIVE PERCENT: 57 % (ref 43–80)
PDW BLD-RTO: 14.6 % (ref 11.5–15)
PLATELET # BLD: 252 K/UL (ref 130–450)
PMV BLD AUTO: 10.2 FL (ref 7–12)
POTASSIUM SERPL-SCNC: 4.4 MMOL/L (ref 3.5–5)
RBC # BLD: 4.43 M/UL (ref 3.5–5.5)
SODIUM BLD-SCNC: 146 MMOL/L (ref 132–146)
TOTAL PROTEIN: 7 G/DL (ref 6.4–8.3)
TRIGLYCERIDE, FASTING: 119 MG/DL
VLDLC SERPL CALC-MCNC: 24 MG/DL
WBC # BLD: 3.7 K/UL (ref 4.5–11.5)

## 2023-08-24 RX ORDER — LATANOPROST 50 UG/ML
1 SOLUTION/ DROPS OPHTHALMIC NIGHTLY
Qty: 7.5 ML | Refills: 2 | Status: CANCELLED | OUTPATIENT
Start: 2023-08-24

## 2023-08-28 ENCOUNTER — OFFICE VISIT (OUTPATIENT)
Dept: PRIMARY CARE CLINIC | Age: 83
End: 2023-08-28
Payer: MEDICARE

## 2023-08-28 VITALS
DIASTOLIC BLOOD PRESSURE: 72 MMHG | BODY MASS INDEX: 33.49 KG/M2 | TEMPERATURE: 97.8 F | SYSTOLIC BLOOD PRESSURE: 120 MMHG | OXYGEN SATURATION: 95 % | WEIGHT: 201 LBS | HEIGHT: 65 IN | HEART RATE: 93 BPM

## 2023-08-28 DIAGNOSIS — I26.94 MULTIPLE SUBSEGMENTAL PULMONARY EMBOLI WITHOUT ACUTE COR PULMONALE (HCC): ICD-10-CM

## 2023-08-28 DIAGNOSIS — Z00.00 MEDICARE ANNUAL WELLNESS VISIT, SUBSEQUENT: Primary | ICD-10-CM

## 2023-08-28 DIAGNOSIS — D70.4 CYCLICAL NEUTROPENIA (HCC): ICD-10-CM

## 2023-08-28 DIAGNOSIS — E11.65 TYPE 2 DIABETES MELLITUS WITH HYPERGLYCEMIA, WITHOUT LONG-TERM CURRENT USE OF INSULIN (HCC): ICD-10-CM

## 2023-08-28 DIAGNOSIS — F32.9 REACTIVE DEPRESSION: ICD-10-CM

## 2023-08-28 DIAGNOSIS — Z12.31 ENCOUNTER FOR SCREENING MAMMOGRAM FOR MALIGNANT NEOPLASM OF BREAST: ICD-10-CM

## 2023-08-28 DIAGNOSIS — E78.2 MIXED HYPERLIPIDEMIA: ICD-10-CM

## 2023-08-28 PROCEDURE — 99214 OFFICE O/P EST MOD 30 MIN: CPT | Performed by: INTERNAL MEDICINE

## 2023-08-28 PROCEDURE — 1123F ACP DISCUSS/DSCN MKR DOCD: CPT | Performed by: INTERNAL MEDICINE

## 2023-08-28 PROCEDURE — 3044F HG A1C LEVEL LT 7.0%: CPT | Performed by: INTERNAL MEDICINE

## 2023-08-28 PROCEDURE — G0439 PPPS, SUBSEQ VISIT: HCPCS | Performed by: INTERNAL MEDICINE

## 2023-08-28 RX ORDER — APIXABAN 2.5 MG/1
2.5 TABLET, FILM COATED ORAL 2 TIMES DAILY
COMMUNITY
Start: 2023-08-10

## 2023-08-28 SDOH — ECONOMIC STABILITY: FOOD INSECURITY: WITHIN THE PAST 12 MONTHS, YOU WORRIED THAT YOUR FOOD WOULD RUN OUT BEFORE YOU GOT MONEY TO BUY MORE.: NEVER TRUE

## 2023-08-28 SDOH — ECONOMIC STABILITY: INCOME INSECURITY: HOW HARD IS IT FOR YOU TO PAY FOR THE VERY BASICS LIKE FOOD, HOUSING, MEDICAL CARE, AND HEATING?: NOT HARD AT ALL

## 2023-08-28 SDOH — ECONOMIC STABILITY: FOOD INSECURITY: WITHIN THE PAST 12 MONTHS, THE FOOD YOU BOUGHT JUST DIDN'T LAST AND YOU DIDN'T HAVE MONEY TO GET MORE.: NEVER TRUE

## 2023-08-28 ASSESSMENT — ENCOUNTER SYMPTOMS
COUGH: 0
APNEA: 0
TROUBLE SWALLOWING: 0
SINUS PAIN: 0
CONSTIPATION: 0
PHOTOPHOBIA: 0
DIARRHEA: 0
BACK PAIN: 0
ABDOMINAL PAIN: 0
SHORTNESS OF BREATH: 0
NAUSEA: 0
BLOOD IN STOOL: 0
SORE THROAT: 0
EYE ITCHING: 0
ABDOMINAL DISTENTION: 0
WHEEZING: 0
VOMITING: 0
EYE DISCHARGE: 0

## 2023-08-28 ASSESSMENT — PATIENT HEALTH QUESTIONNAIRE - PHQ9
7. TROUBLE CONCENTRATING ON THINGS, SUCH AS READING THE NEWSPAPER OR WATCHING TELEVISION: 0
5. POOR APPETITE OR OVEREATING: 0
SUM OF ALL RESPONSES TO PHQ QUESTIONS 1-9: 0
4. FEELING TIRED OR HAVING LITTLE ENERGY: 0
1. LITTLE INTEREST OR PLEASURE IN DOING THINGS: 0
8. MOVING OR SPEAKING SO SLOWLY THAT OTHER PEOPLE COULD HAVE NOTICED. OR THE OPPOSITE, BEING SO FIGETY OR RESTLESS THAT YOU HAVE BEEN MOVING AROUND A LOT MORE THAN USUAL: 0
SUM OF ALL RESPONSES TO PHQ QUESTIONS 1-9: 0
3. TROUBLE FALLING OR STAYING ASLEEP: 0
10. IF YOU CHECKED OFF ANY PROBLEMS, HOW DIFFICULT HAVE THESE PROBLEMS MADE IT FOR YOU TO DO YOUR WORK, TAKE CARE OF THINGS AT HOME, OR GET ALONG WITH OTHER PEOPLE: 0
SUM OF ALL RESPONSES TO PHQ QUESTIONS 1-9: 0
9. THOUGHTS THAT YOU WOULD BE BETTER OFF DEAD, OR OF HURTING YOURSELF: 0
SUM OF ALL RESPONSES TO PHQ QUESTIONS 1-9: 0
SUM OF ALL RESPONSES TO PHQ9 QUESTIONS 1 & 2: 0
2. FEELING DOWN, DEPRESSED OR HOPELESS: 0
6. FEELING BAD ABOUT YOURSELF - OR THAT YOU ARE A FAILURE OR HAVE LET YOURSELF OR YOUR FAMILY DOWN: 0

## 2023-08-28 ASSESSMENT — LIFESTYLE VARIABLES
HOW MANY STANDARD DRINKS CONTAINING ALCOHOL DO YOU HAVE ON A TYPICAL DAY: 1 OR 2
HOW OFTEN DO YOU HAVE A DRINK CONTAINING ALCOHOL: 2-3 TIMES A WEEK

## 2023-08-28 NOTE — PROGRESS NOTES
Meaghan Baldwin (:  1940) is a 82 y.o. female,Established patient, here for evaluation of the following chief complaint(s):  Medicare AWV      Subjective   SUBJECTIVE/OBJECTIVE:  HPI:  1)Hyperlipidemia: Not up to goal  Patient is here to follow up regarding chronic hyperlipidemia. This is  generally controlled. Treatment includes lovastain  Patient is  compliant with lifestyle modifications. Patient is not a smoker. Most recent labs reviewed with patient today and are not remarkable. Comorbid conditions include obesity. 2) new onset diabetes would not start medication at this point patient was notified to start low-carb diet  3) PE is still on Eliquis and doing well  4) depression well-controlled on Paxil  Review of Systems   Constitutional:  Negative for activity change, appetite change, fever and unexpected weight change. HENT:  Negative for congestion, ear pain, hearing loss, sinus pain, sore throat, tinnitus and trouble swallowing. Eyes:  Negative for photophobia, discharge, itching and visual disturbance. Respiratory:  Negative for apnea, cough, shortness of breath and wheezing. Cardiovascular:  Negative for chest pain, palpitations and leg swelling. Gastrointestinal:  Negative for abdominal distention, abdominal pain, blood in stool, constipation, diarrhea, nausea and vomiting. Endocrine: Negative for cold intolerance, polydipsia and polyuria. Genitourinary:  Negative for difficulty urinating, dysuria, frequency and pelvic pain. Musculoskeletal:  Negative for arthralgias, back pain, joint swelling, myalgias, neck pain and neck stiffness. Skin:  Negative for rash and wound. Neurological:  Negative for dizziness, tremors, syncope, light-headedness and headaches.    CBC with Differential:    Lab Results   Component Value Date/Time    WBC 3.7 2023 10:57 AM    RBC 4.43 2023 10:57 AM    HGB 12.7 2023 10:57 AM    HCT 39.7 2023 10:57 AM    
omeprazole (PRILOSEC) 20 MG delayed release capsule TAKE 1 CAPSULE BY MOUTH EVERY MORNING BEFORE BREAKFAST Yes Christian Weems MD   PARoxetine (PAXIL) 10 MG tablet TAKE 1 TABLET BY MOUTH EVERY MORNING Yes Christian Weems MD   latanoprost (XALATAN) 0.005 % ophthalmic solution Place 1 drop into both eyes nightly Yes Christian Weems MD   fluticasone (FLONASE) 50 MCG/ACT nasal spray 2 sprays by Nasal route daily Yes Christian Weems MD   ELIQUIS 2.5 MG TABS tablet Take 1 tablet by mouth 2 times daily  Historical Provider, MD   levETIRAcetam (KEPPRA) 500 MG tablet Take 1 tablet by mouth 2 times daily for 12 doses  Patient not taking: Reported on 8/28/2023  Amy Thibodeaux MD   butalbital-acetaminophen-caffeine (FIORICET, ESGIC) -75 MG per tablet Take 1 tablet by mouth every 6 hours as needed for Headaches (moderate HA)  Patient not taking: Reported on 8/28/2023  Amy Thibodeaux MD       [facility] (Including outside providers/suppliers regularly involved in providing care):   Patient Care Team:  Chasity Simons MD as PCP - General (Internal Medicine)  Chasity Simons MD as PCP - Empaneled Provider  Gurmeet Brennan MD as Consulting Physician (Cardiology)     Reviewed and updated this visit:  Tobacco  Allergies  Meds  Problems  Med Hx  Surg Hx  Soc Hx  Fam Hx

## 2023-09-08 LAB — DIABETIC RETINOPATHY: NEGATIVE

## 2023-10-12 ENCOUNTER — HOSPITAL ENCOUNTER (OUTPATIENT)
Dept: MAMMOGRAPHY | Age: 83
Discharge: HOME | End: 2023-10-14
Payer: MEDICARE

## 2023-10-12 VITALS — BODY MASS INDEX: 32.49 KG/M2 | HEIGHT: 65 IN | WEIGHT: 195 LBS

## 2023-10-12 DIAGNOSIS — Z12.31 ENCOUNTER FOR SCREENING MAMMOGRAM FOR MALIGNANT NEOPLASM OF BREAST: ICD-10-CM

## 2023-10-12 PROCEDURE — 77063 BREAST TOMOSYNTHESIS BI: CPT

## 2023-11-06 RX ORDER — APIXABAN 2.5 MG/1
2.5 TABLET, FILM COATED ORAL 2 TIMES DAILY
Qty: 180 TABLET | Refills: 0 | Status: SHIPPED | OUTPATIENT
Start: 2023-11-06

## 2023-11-06 ASSESSMENT — ENCOUNTER SYMPTOMS
PHOTOPHOBIA: 0
VOMITING: 0
CONSTIPATION: 0
EYE DISCHARGE: 0
BACK PAIN: 0
SINUS PAIN: 0
TROUBLE SWALLOWING: 0
EYE ITCHING: 0
WHEEZING: 0
COUGH: 0
NAUSEA: 0
APNEA: 0
ABDOMINAL PAIN: 0
BLOOD IN STOOL: 0
SHORTNESS OF BREATH: 0
DIARRHEA: 0
SORE THROAT: 0
ABDOMINAL DISTENTION: 0

## 2023-11-06 NOTE — PROGRESS NOTES
Del Lira (:  1940) is a 82 y.o. female,Established patient, here for evaluation of the following chief complaint(s):  3 Month Follow-Up and Leg Swelling (Rt leg swelling and red)      Subjective   SUBJECTIVE/OBJECTIVE:  HPI:  Hyperlipidemia:  Patient is here to follow up regarding chronic hyperlipidemia. This is  generally controlled. Treatment includes mevacor  Patient is  compliant with lifestyle modifications. Patient is not a smoker. Most recent labs reviewed with patient today and are not remarkable. Comorbid conditions include obesity. DM: pt will monitor diet   Depression: well controlled on paxil    Review of Systems   Constitutional:  Negative for activity change, appetite change, fever and unexpected weight change. HENT:  Negative for congestion, ear pain, hearing loss, sinus pain, sore throat, tinnitus and trouble swallowing. Eyes:  Negative for photophobia, discharge, itching and visual disturbance. Respiratory:  Negative for apnea, cough, shortness of breath and wheezing. Cardiovascular:  Negative for chest pain, palpitations and leg swelling. Gastrointestinal:  Negative for abdominal distention, abdominal pain, blood in stool, constipation, diarrhea, nausea and vomiting. Endocrine: Negative for cold intolerance, polydipsia and polyuria. Genitourinary:  Negative for difficulty urinating, dysuria, frequency and pelvic pain. Musculoskeletal:  Negative for arthralgias, back pain, joint swelling, myalgias, neck pain and neck stiffness. Skin:  Negative for rash and wound. Neurological:  Negative for dizziness, tremors, syncope, light-headedness and headaches.      CBC with Differential:    Lab Results   Component Value Date/Time    WBC 4.2 2023 11:16 AM    RBC 4.20 2023 11:16 AM    HGB 12.1 2023 11:16 AM    HCT 38.2 2023 11:16 AM     2023 11:16 AM    MCV 91.0 2023 11:16 AM    MCH 28.8 2023 11:16 AM    MCHC 31.7

## 2023-11-07 RX ORDER — OMEPRAZOLE 20 MG/1
CAPSULE, DELAYED RELEASE ORAL
Qty: 90 CAPSULE | Refills: 0 | Status: SHIPPED | OUTPATIENT
Start: 2023-11-07

## 2023-11-08 DIAGNOSIS — F32.9 REACTIVE DEPRESSION: ICD-10-CM

## 2023-11-08 LAB — DIABETIC RETINOPATHY: NEGATIVE

## 2023-11-08 RX ORDER — PAROXETINE 10 MG/1
10 TABLET, FILM COATED ORAL EVERY MORNING
Qty: 30 TABLET | Refills: 0 | Status: SHIPPED
Start: 2023-11-08 | End: 2023-12-04 | Stop reason: SDUPTHER

## 2023-11-12 DIAGNOSIS — E78.2 MIXED HYPERLIPIDEMIA: ICD-10-CM

## 2023-11-13 RX ORDER — LOVASTATIN 40 MG/1
TABLET ORAL
Qty: 90 TABLET | Refills: 0 | Status: SHIPPED | OUTPATIENT
Start: 2023-11-13

## 2023-12-01 DIAGNOSIS — E78.2 MIXED HYPERLIPIDEMIA: ICD-10-CM

## 2023-12-01 DIAGNOSIS — D70.4 CYCLICAL NEUTROPENIA (HCC): ICD-10-CM

## 2023-12-01 DIAGNOSIS — E11.65 TYPE 2 DIABETES MELLITUS WITH HYPERGLYCEMIA, WITHOUT LONG-TERM CURRENT USE OF INSULIN (HCC): ICD-10-CM

## 2023-12-01 LAB
ABSOLUTE IMMATURE GRANULOCYTE: <0.03 K/UL (ref 0–0.58)
ALBUMIN SERPL-MCNC: 3.9 G/DL (ref 3.5–5.2)
ALP BLD-CCNC: 96 U/L (ref 35–104)
ALT SERPL-CCNC: 12 U/L (ref 0–32)
ANION GAP SERPL CALCULATED.3IONS-SCNC: 15 MMOL/L (ref 7–16)
AST SERPL-CCNC: 16 U/L (ref 0–31)
BASOPHILS ABSOLUTE: 0.02 K/UL (ref 0–0.2)
BASOPHILS RELATIVE PERCENT: 1 % (ref 0–2)
BILIRUB SERPL-MCNC: 0.4 MG/DL (ref 0–1.2)
BUN BLDV-MCNC: 12 MG/DL (ref 6–23)
CALCIUM SERPL-MCNC: 9.4 MG/DL (ref 8.6–10.2)
CHLORIDE BLD-SCNC: 106 MMOL/L (ref 98–107)
CHOLESTEROL, FASTING: 198 MG/DL
CO2: 23 MMOL/L (ref 22–29)
CREAT SERPL-MCNC: 0.8 MG/DL (ref 0.5–1)
EOSINOPHILS ABSOLUTE: 0.09 K/UL (ref 0.05–0.5)
EOSINOPHILS RELATIVE PERCENT: 2 % (ref 0–6)
GFR SERPL CREATININE-BSD FRML MDRD: >60 ML/MIN/1.73M2
GLUCOSE BLD-MCNC: 94 MG/DL (ref 74–99)
HBA1C MFR BLD: 5.8 % (ref 4–5.6)
HCT VFR BLD CALC: 38.2 % (ref 34–48)
HDLC SERPL-MCNC: 68 MG/DL
HEMOGLOBIN: 12.1 G/DL (ref 11.5–15.5)
IMMATURE GRANULOCYTES: 0 % (ref 0–5)
LDL CHOLESTEROL: 105 MG/DL
LYMPHOCYTES ABSOLUTE: 1.46 K/UL (ref 1.5–4)
LYMPHOCYTES RELATIVE PERCENT: 35 % (ref 20–42)
MCH RBC QN AUTO: 28.8 PG (ref 26–35)
MCHC RBC AUTO-ENTMCNC: 31.7 G/DL (ref 32–34.5)
MCV RBC AUTO: 91 FL (ref 80–99.9)
MONOCYTES ABSOLUTE: 0.35 K/UL (ref 0.1–0.95)
MONOCYTES RELATIVE PERCENT: 8 % (ref 2–12)
NEUTROPHILS ABSOLUTE: 2.26 K/UL (ref 1.8–7.3)
NEUTROPHILS RELATIVE PERCENT: 54 % (ref 43–80)
PDW BLD-RTO: 13.5 % (ref 11.5–15)
PLATELET # BLD: 241 K/UL (ref 130–450)
PMV BLD AUTO: 10.1 FL (ref 7–12)
POTASSIUM SERPL-SCNC: 4.1 MMOL/L (ref 3.5–5)
RBC # BLD: 4.2 M/UL (ref 3.5–5.5)
SODIUM BLD-SCNC: 144 MMOL/L (ref 132–146)
TOTAL PROTEIN: 6.5 G/DL (ref 6.4–8.3)
TRIGLYCERIDE, FASTING: 127 MG/DL
VLDLC SERPL CALC-MCNC: 25 MG/DL
WBC # BLD: 4.2 K/UL (ref 4.5–11.5)

## 2023-12-04 ENCOUNTER — OFFICE VISIT (OUTPATIENT)
Dept: PRIMARY CARE CLINIC | Age: 83
End: 2023-12-04
Payer: MEDICARE

## 2023-12-04 VITALS
HEART RATE: 76 BPM | DIASTOLIC BLOOD PRESSURE: 72 MMHG | SYSTOLIC BLOOD PRESSURE: 130 MMHG | OXYGEN SATURATION: 93 % | TEMPERATURE: 97.8 F | WEIGHT: 202 LBS | BODY MASS INDEX: 33.66 KG/M2 | HEIGHT: 65 IN

## 2023-12-04 DIAGNOSIS — F32.9 REACTIVE DEPRESSION: ICD-10-CM

## 2023-12-04 DIAGNOSIS — I26.94 MULTIPLE SUBSEGMENTAL PULMONARY EMBOLI WITHOUT ACUTE COR PULMONALE (HCC): ICD-10-CM

## 2023-12-04 DIAGNOSIS — E11.65 TYPE 2 DIABETES MELLITUS WITH HYPERGLYCEMIA, WITHOUT LONG-TERM CURRENT USE OF INSULIN (HCC): ICD-10-CM

## 2023-12-04 DIAGNOSIS — E78.2 MIXED HYPERLIPIDEMIA: Primary | ICD-10-CM

## 2023-12-04 PROCEDURE — 3044F HG A1C LEVEL LT 7.0%: CPT | Performed by: INTERNAL MEDICINE

## 2023-12-04 PROCEDURE — 1123F ACP DISCUSS/DSCN MKR DOCD: CPT | Performed by: INTERNAL MEDICINE

## 2023-12-04 PROCEDURE — 99214 OFFICE O/P EST MOD 30 MIN: CPT | Performed by: INTERNAL MEDICINE

## 2023-12-04 RX ORDER — PAROXETINE 10 MG/1
10 TABLET, FILM COATED ORAL EVERY MORNING
Qty: 90 TABLET | Refills: 0 | Status: SHIPPED | OUTPATIENT
Start: 2023-12-04 | End: 2024-03-03

## 2024-02-01 RX ORDER — APIXABAN 2.5 MG/1
2.5 TABLET, FILM COATED ORAL 2 TIMES DAILY
Qty: 180 TABLET | Refills: 0 | Status: SHIPPED | OUTPATIENT
Start: 2024-02-01

## 2024-02-05 RX ORDER — OMEPRAZOLE 20 MG/1
CAPSULE, DELAYED RELEASE ORAL
Qty: 90 CAPSULE | Refills: 0 | Status: SHIPPED | OUTPATIENT
Start: 2024-02-05

## 2024-02-12 DIAGNOSIS — E78.2 MIXED HYPERLIPIDEMIA: ICD-10-CM

## 2024-02-12 RX ORDER — LOVASTATIN 40 MG/1
TABLET ORAL
Qty: 90 TABLET | Refills: 0 | Status: SHIPPED | OUTPATIENT
Start: 2024-02-12

## 2024-03-01 DIAGNOSIS — F32.9 REACTIVE DEPRESSION: ICD-10-CM

## 2024-03-01 RX ORDER — PAROXETINE 10 MG/1
10 TABLET, FILM COATED ORAL EVERY MORNING
Qty: 90 TABLET | Refills: 0 | Status: SHIPPED | OUTPATIENT
Start: 2024-03-01 | End: 2024-05-30

## 2024-03-06 DIAGNOSIS — E78.2 MIXED HYPERLIPIDEMIA: ICD-10-CM

## 2024-03-06 DIAGNOSIS — E11.65 TYPE 2 DIABETES MELLITUS WITH HYPERGLYCEMIA, WITHOUT LONG-TERM CURRENT USE OF INSULIN (HCC): ICD-10-CM

## 2024-03-07 LAB
ALBUMIN SERPL-MCNC: 4.1 G/DL (ref 3.5–5.2)
ALP BLD-CCNC: 91 U/L (ref 35–104)
ALT SERPL-CCNC: 10 U/L (ref 0–32)
ANION GAP SERPL CALCULATED.3IONS-SCNC: 12 MMOL/L (ref 7–16)
AST SERPL-CCNC: 14 U/L (ref 0–31)
BILIRUB SERPL-MCNC: 0.4 MG/DL (ref 0–1.2)
BUN BLDV-MCNC: 13 MG/DL (ref 6–23)
CALCIUM SERPL-MCNC: 9.3 MG/DL (ref 8.6–10.2)
CHLORIDE BLD-SCNC: 104 MMOL/L (ref 98–107)
CHOLESTEROL, FASTING: 205 MG/DL
CO2: 24 MMOL/L (ref 22–29)
CREAT SERPL-MCNC: 0.9 MG/DL (ref 0.5–1)
GFR SERPL CREATININE-BSD FRML MDRD: >60 ML/MIN/1.73M2
GLUCOSE BLD-MCNC: 94 MG/DL (ref 74–99)
HBA1C MFR BLD: 6 % (ref 4–5.6)
HDLC SERPL-MCNC: 71 MG/DL
LDL CHOLESTEROL: 110 MG/DL
POTASSIUM SERPL-SCNC: 4.2 MMOL/L (ref 3.5–5)
SODIUM BLD-SCNC: 140 MMOL/L (ref 132–146)
TOTAL PROTEIN: 6.9 G/DL (ref 6.4–8.3)
TRIGLYCERIDE, FASTING: 122 MG/DL
VLDLC SERPL CALC-MCNC: 24 MG/DL

## 2024-03-08 ENCOUNTER — OFFICE VISIT (OUTPATIENT)
Dept: PRIMARY CARE CLINIC | Age: 84
End: 2024-03-08

## 2024-03-08 VITALS
BODY MASS INDEX: 35.98 KG/M2 | OXYGEN SATURATION: 97 % | DIASTOLIC BLOOD PRESSURE: 72 MMHG | SYSTOLIC BLOOD PRESSURE: 138 MMHG | HEIGHT: 63 IN | TEMPERATURE: 97.4 F | RESPIRATION RATE: 16 BRPM | HEART RATE: 78 BPM | WEIGHT: 203.1 LBS

## 2024-03-08 DIAGNOSIS — I26.94 MULTIPLE SUBSEGMENTAL PULMONARY EMBOLI WITHOUT ACUTE COR PULMONALE (HCC): ICD-10-CM

## 2024-03-08 DIAGNOSIS — F32.9 REACTIVE DEPRESSION: ICD-10-CM

## 2024-03-08 DIAGNOSIS — E78.2 MIXED HYPERLIPIDEMIA: ICD-10-CM

## 2024-03-08 DIAGNOSIS — E11.65 TYPE 2 DIABETES MELLITUS WITH HYPERGLYCEMIA, WITHOUT LONG-TERM CURRENT USE OF INSULIN (HCC): ICD-10-CM

## 2024-03-08 DIAGNOSIS — Z00.00 MEDICARE ANNUAL WELLNESS VISIT, SUBSEQUENT: Primary | ICD-10-CM

## 2024-03-08 DIAGNOSIS — D70.9 NEUTROPENIA, UNSPECIFIED TYPE (HCC): ICD-10-CM

## 2024-03-08 PROBLEM — S06.5XAA SUBDURAL HEMATOMA (HCC): Status: RESOLVED | Noted: 2023-06-24 | Resolved: 2024-03-08

## 2024-03-08 PROBLEM — M17.11 OSTEOARTHRITIS OF RIGHT PATELLOFEMORAL JOINT: Status: RESOLVED | Noted: 2020-06-23 | Resolved: 2024-03-08

## 2024-03-08 PROBLEM — S06.5XAA SDH (SUBDURAL HEMATOMA) (HCC): Status: RESOLVED | Noted: 2023-06-25 | Resolved: 2024-03-08

## 2024-03-08 PROBLEM — T84.012A FAILED TOTAL RIGHT KNEE REPLACEMENT (HCC): Status: RESOLVED | Noted: 2020-07-08 | Resolved: 2024-03-08

## 2024-03-08 RX ORDER — FLUTICASONE PROPIONATE 50 MCG
2 SPRAY, SUSPENSION (ML) NASAL DAILY
Qty: 16 G | Refills: 0 | Status: SHIPPED | OUTPATIENT
Start: 2024-03-08

## 2024-03-08 RX ORDER — FLUTICASONE PROPIONATE 50 MCG
2 SPRAY, SUSPENSION (ML) NASAL DAILY
Qty: 48 G | OUTPATIENT
Start: 2024-03-08

## 2024-03-08 SDOH — HEALTH STABILITY: PHYSICAL HEALTH: ON AVERAGE, HOW MANY DAYS PER WEEK DO YOU ENGAGE IN MODERATE TO STRENUOUS EXERCISE (LIKE A BRISK WALK)?: 1 DAY

## 2024-03-08 SDOH — HEALTH STABILITY: PHYSICAL HEALTH: ON AVERAGE, HOW MANY MINUTES DO YOU ENGAGE IN EXERCISE AT THIS LEVEL?: 20 MIN

## 2024-03-08 ASSESSMENT — LIFESTYLE VARIABLES
HOW OFTEN DURING THE LAST YEAR HAVE YOU BEEN UNABLE TO REMEMBER WHAT HAPPENED THE NIGHT BEFORE BECAUSE YOU HAD BEEN DRINKING: 0
HOW OFTEN DO YOU HAVE A DRINK CONTAINING ALCOHOL: 4 OR MORE TIMES A WEEK
HOW MANY STANDARD DRINKS CONTAINING ALCOHOL DO YOU HAVE ON A TYPICAL DAY: 1 OR 2
HOW OFTEN DO YOU HAVE SIX OR MORE DRINKS ON ONE OCCASION: 1
HOW OFTEN DURING THE LAST YEAR HAVE YOU NEEDED AN ALCOHOLIC DRINK FIRST THING IN THE MORNING TO GET YOURSELF GOING AFTER A NIGHT OF HEAVY DRINKING: 0
HOW MANY STANDARD DRINKS CONTAINING ALCOHOL DO YOU HAVE ON A TYPICAL DAY: 1
HOW OFTEN DO YOU HAVE A DRINK CONTAINING ALCOHOL: 5
HAS A RELATIVE, FRIEND, DOCTOR, OR ANOTHER HEALTH PROFESSIONAL EXPRESSED CONCERN ABOUT YOUR DRINKING OR SUGGESTED YOU CUT DOWN: 0
HOW OFTEN DURING THE LAST YEAR HAVE YOU NEEDED AN ALCOHOLIC DRINK FIRST THING IN THE MORNING TO GET YOURSELF GOING AFTER A NIGHT OF HEAVY DRINKING: NEVER
HOW OFTEN DURING THE LAST YEAR HAVE YOU FAILED TO DO WHAT WAS NORMALLY EXPECTED FROM YOU BECAUSE OF DRINKING: 0
HOW OFTEN DURING THE LAST YEAR HAVE YOU FOUND THAT YOU WERE NOT ABLE TO STOP DRINKING ONCE YOU HAD STARTED: NEVER
HAS A RELATIVE, FRIEND, DOCTOR, OR ANOTHER HEALTH PROFESSIONAL EXPRESSED CONCERN ABOUT YOUR DRINKING OR SUGGESTED YOU CUT DOWN: NO
HAVE YOU OR SOMEONE ELSE BEEN INJURED AS A RESULT OF YOUR DRINKING: 0
HOW OFTEN DURING THE LAST YEAR HAVE YOU FAILED TO DO WHAT WAS NORMALLY EXPECTED FROM YOU BECAUSE OF DRINKING: NEVER
HAVE YOU OR SOMEONE ELSE BEEN INJURED AS A RESULT OF YOUR DRINKING: NO
HOW OFTEN DURING THE LAST YEAR HAVE YOU FOUND THAT YOU WERE NOT ABLE TO STOP DRINKING ONCE YOU HAD STARTED: 0
HOW OFTEN DURING THE LAST YEAR HAVE YOU BEEN UNABLE TO REMEMBER WHAT HAPPENED THE NIGHT BEFORE BECAUSE YOU HAD BEEN DRINKING: NEVER
HOW OFTEN DURING THE LAST YEAR HAVE YOU HAD A FEELING OF GUILT OR REMORSE AFTER DRINKING: NEVER
HOW OFTEN DURING THE LAST YEAR HAVE YOU HAD A FEELING OF GUILT OR REMORSE AFTER DRINKING: 0

## 2024-03-08 ASSESSMENT — ENCOUNTER SYMPTOMS
DIARRHEA: 0
APNEA: 0
WHEEZING: 0
BACK PAIN: 0
PHOTOPHOBIA: 0
SINUS PAIN: 0
BLOOD IN STOOL: 0
EYE DISCHARGE: 0
NAUSEA: 0
EYE ITCHING: 0
SHORTNESS OF BREATH: 0
TROUBLE SWALLOWING: 0
VOMITING: 0
COUGH: 0
SORE THROAT: 0
ABDOMINAL PAIN: 0
ABDOMINAL DISTENTION: 0
CONSTIPATION: 0

## 2024-03-08 ASSESSMENT — PATIENT HEALTH QUESTIONNAIRE - PHQ9
SUM OF ALL RESPONSES TO PHQ9 QUESTIONS 1 & 2: 0
SUM OF ALL RESPONSES TO PHQ QUESTIONS 1-9: 0
1. LITTLE INTEREST OR PLEASURE IN DOING THINGS: 0
2. FEELING DOWN, DEPRESSED OR HOPELESS: 0
SUM OF ALL RESPONSES TO PHQ QUESTIONS 1-9: 0

## 2024-03-08 NOTE — PROGRESS NOTES
Medicare Annual Wellness Visit    Deborah Iglesias is here for Medicare AWV    Assessment & Plan   Medicare annual wellness visit, subsequent  Recommendations for Preventive Services Due: see orders and patient instructions/AVS.  Recommended screening schedule for the next 5-10 years is provided to the patient in written form: see Patient Instructions/AVS.     Return for Medicare Annual Wellness Visit in 1 year.     Subjective       Patient's complete Health Risk Assessment and screening values have been reviewed and are found in Flowsheets. The following problems were reviewed today and where indicated follow up appointments were made and/or referrals ordered.    Positive Risk Factor Screenings with Interventions:    Fall Risk:  Do you feel unsteady or are you worried about falling? : (!) yes  2 or more falls in past year?: no  Fall with injury in past year?: (!) yes     Interventions:    Patient comments: pt is using cane  Reviewed medications, home hazards, visual acuity, and co-morbidities that can increase risk for falls             Activity, Diet, and Weight:  On average, how many days per week do you engage in moderate to strenuous exercise (like a brisk walk)?: 1 day  On average, how many minutes do you engage in exercise at this level?: 20 min    Do you eat balanced/healthy meals regularly?: Yes    Body mass index is 35.54 kg/m². (!) Abnormal    Obesity Interventions:  Patient comments: low carb diet                               Objective   Vitals:    03/08/24 1235   BP: 138/72   Pulse: 78   Resp: 16   Temp: 97.4 °F (36.3 °C)   TempSrc: Infrared   SpO2: 97%   Weight: 92.1 kg (203 lb 1.6 oz)   Height: 1.61 m (5' 3.39\")      Body mass index is 35.54 kg/m².               Allergies   Allergen Reactions    Adhesive Tape Rash    Augmentin [Amoxicillin-Pot Clavulanate] Rash    Amoxicillin Rash    Clavulanic Acid Rash     Prior to Visit Medications    Medication Sig Taking? Authorizing Provider   PARoxetine (PAXIL) 
  Housing Stability: Unknown (8/28/2023)    Housing Stability Vital Sign     Unable to Pay for Housing in the Last Year: Not on file     Number of Places Lived in the Last Year: Not on file     Unstable Housing in the Last Year: No      Health Maintenance Due   Topic Date Due    Respiratory Syncytial Virus (RSV) Pregnant or age 60 yrs+ (1 - 1-dose 60+ series) Never done    Shingles vaccine (1 of 2) 10/09/2014        Physical Exam  Constitutional:       Appearance: Normal appearance. She is normal weight.   HENT:      Head: Normocephalic and atraumatic.      Right Ear: Tympanic membrane and ear canal normal.      Left Ear: Tympanic membrane and ear canal normal.      Nose: Nose normal.      Mouth/Throat:      Mouth: Mucous membranes are moist.      Pharynx: Oropharynx is clear.   Eyes:      Extraocular Movements: Extraocular movements intact.      Conjunctiva/sclera: Conjunctivae normal.      Pupils: Pupils are equal, round, and reactive to light.   Cardiovascular:      Rate and Rhythm: Normal rate and regular rhythm.      Pulses: Normal pulses.      Heart sounds: Normal heart sounds.   Pulmonary:      Effort: Pulmonary effort is normal.      Breath sounds: Normal breath sounds.   Abdominal:      General: Abdomen is flat. Bowel sounds are normal.      Palpations: Abdomen is soft.   Musculoskeletal:         General: Normal range of motion.      Right shoulder: Normal.      Left shoulder: Normal.      Right upper arm: Normal.      Left upper arm: Normal.      Right elbow: Normal.      Left elbow: Normal.      Right forearm: Normal.      Left forearm: Normal.      Right wrist: Normal.      Left wrist: Normal.      Right hand: Normal.      Left hand: Normal.      Cervical back: Normal, normal range of motion and neck supple.      Lumbar back: Normal.   Skin:     General: Skin is warm and dry.   Neurological:      General: No focal deficit present.      Mental Status: She is alert and oriented to person, place, and time.

## 2024-04-05 RX ORDER — FLUTICASONE PROPIONATE 50 MCG
2 SPRAY, SUSPENSION (ML) NASAL DAILY
Qty: 16 G | Refills: 0 | Status: SHIPPED | OUTPATIENT
Start: 2024-04-05

## 2024-04-05 RX ORDER — FLUTICASONE PROPIONATE 50 MCG
2 SPRAY, SUSPENSION (ML) NASAL DAILY
Qty: 48 G | OUTPATIENT
Start: 2024-04-05

## 2024-04-25 PROBLEM — M25.562 CHRONIC PAIN OF LEFT KNEE: Status: ACTIVE | Noted: 2024-04-25

## 2024-04-25 PROBLEM — E11.65 TYPE 2 DIABETES MELLITUS WITH HYPERGLYCEMIA, WITHOUT LONG-TERM CURRENT USE OF INSULIN (HCC): Status: ACTIVE | Noted: 2024-04-25

## 2024-04-25 PROBLEM — I10 PRIMARY HYPERTENSION: Status: ACTIVE | Noted: 2024-04-25

## 2024-04-25 PROBLEM — M25.552 PAIN OF LEFT HIP: Status: ACTIVE | Noted: 2024-04-25

## 2024-04-25 PROBLEM — E55.9 VITAMIN D DEFICIENCY: Status: ACTIVE | Noted: 2024-04-25

## 2024-04-25 PROBLEM — G89.29 CHRONIC PAIN OF LEFT KNEE: Status: ACTIVE | Noted: 2024-04-25

## 2024-04-25 PROBLEM — E53.8 VITAMIN B12 DEFICIENCY: Status: ACTIVE | Noted: 2024-04-25

## 2024-05-09 DIAGNOSIS — E78.2 MIXED HYPERLIPIDEMIA: ICD-10-CM

## 2024-05-09 RX ORDER — LOVASTATIN 40 MG/1
TABLET ORAL
Qty: 90 TABLET | Refills: 0 | OUTPATIENT
Start: 2024-05-09

## 2024-05-16 ENCOUNTER — HOSPITAL ENCOUNTER (OUTPATIENT)
Age: 84
Discharge: HOME | End: 2024-05-18
Payer: MEDICARE

## 2024-05-16 VITALS
HEIGHT: 64 IN | SYSTOLIC BLOOD PRESSURE: 156 MMHG | DIASTOLIC BLOOD PRESSURE: 73 MMHG | WEIGHT: 200 LBS | BODY MASS INDEX: 34.15 KG/M2

## 2024-05-16 DIAGNOSIS — R06.02 SHORTNESS OF BREATH: ICD-10-CM

## 2024-05-16 LAB
ECHO AO ASC DIAM: 2.6 CM
ECHO AO ASCENDING AORTA INDEX: 1.33 CM/M2
ECHO AV AREA PEAK VELOCITY: 1.3 CM2
ECHO AV AREA VTI: 1.4 CM2
ECHO AV AREA/BSA PEAK VELOCITY: 0.7 CM2/M2
ECHO AV AREA/BSA VTI: 0.7 CM2/M2
ECHO AV CUSP MM: 1.5 CM
ECHO AV MEAN GRADIENT: 5 MMHG
ECHO AV MEAN VELOCITY: 1.1 M/S
ECHO AV PEAK GRADIENT: 9 MMHG
ECHO AV PEAK VELOCITY: 1.5 M/S
ECHO AV VELOCITY RATIO: 0.53
ECHO AV VTI: 29.6 CM
ECHO BSA: 2.02 M2
ECHO LA DIAMETER INDEX: 1.12 CM/M2
ECHO LA DIAMETER: 2.2 CM
ECHO LA VOL A-L A2C: 30 ML (ref 22–52)
ECHO LA VOL A-L A4C: 55 ML (ref 22–52)
ECHO LA VOL BP: 41 ML (ref 22–52)
ECHO LA VOL MOD A2C: 26 ML (ref 22–52)
ECHO LA VOL MOD A4C: 50 ML (ref 22–52)
ECHO LA VOL/BSA BIPLANE: 21 ML/M2 (ref 16–34)
ECHO LA VOLUME AREA LENGTH: 47 ML
ECHO LA VOLUME INDEX A-L A2C: 15 ML/M2 (ref 16–34)
ECHO LA VOLUME INDEX A-L A4C: 28 ML/M2 (ref 16–34)
ECHO LA VOLUME INDEX AREA LENGTH: 24 ML/M2 (ref 16–34)
ECHO LA VOLUME INDEX MOD A2C: 13 ML/M2 (ref 16–34)
ECHO LA VOLUME INDEX MOD A4C: 26 ML/M2 (ref 16–34)
ECHO LV EDV A2C: 48 ML
ECHO LV EDV A4C: 36 ML
ECHO LV EDV BP: 42 ML (ref 56–104)
ECHO LV EDV INDEX A4C: 18 ML/M2
ECHO LV EDV INDEX BP: 21 ML/M2
ECHO LV EDV NDEX A2C: 24 ML/M2
ECHO LV EF PHYSICIAN: 55 %
ECHO LV EJECTION FRACTION A2C: 51 %
ECHO LV EJECTION FRACTION A4C: 59 %
ECHO LV EJECTION FRACTION BIPLANE: 54 % (ref 55–100)
ECHO LV ESV A2C: 24 ML
ECHO LV ESV A4C: 15 ML
ECHO LV ESV BP: 19 ML (ref 19–49)
ECHO LV ESV INDEX A2C: 12 ML/M2
ECHO LV ESV INDEX A4C: 8 ML/M2
ECHO LV ESV INDEX BP: 10 ML/M2
ECHO LV FRACTIONAL SHORTENING: 32 % (ref 28–44)
ECHO LV INTERNAL DIMENSION DIASTOLE INDEX: 2.09 CM/M2
ECHO LV INTERNAL DIMENSION DIASTOLIC: 4.1 CM (ref 3.9–5.3)
ECHO LV INTERNAL DIMENSION SYSTOLIC INDEX: 1.43 CM/M2
ECHO LV INTERNAL DIMENSION SYSTOLIC: 2.8 CM
ECHO LV ISOVOLUMETRIC RELAXATION TIME (IVRT): 99 MS
ECHO LV IVSD: 1 CM (ref 0.6–0.9)
ECHO LV IVSS: 1.6 CM
ECHO LV MASS 2D: 114.1 G (ref 67–162)
ECHO LV MASS INDEX 2D: 58.2 G/M2 (ref 43–95)
ECHO LV POSTERIOR WALL DIASTOLIC: 0.8 CM (ref 0.6–0.9)
ECHO LV POSTERIOR WALL SYSTOLIC: 1.1 CM
ECHO LV RELATIVE WALL THICKNESS RATIO: 0.39
ECHO LVOT AREA: 2.5 CM2
ECHO LVOT AV VTI INDEX: 0.56
ECHO LVOT DIAM: 1.8 CM
ECHO LVOT MEAN GRADIENT: 1 MMHG
ECHO LVOT PEAK GRADIENT: 2 MMHG
ECHO LVOT PEAK VELOCITY: 0.8 M/S
ECHO LVOT STROKE VOLUME INDEX: 21.5 ML/M2
ECHO LVOT SV: 42.2 ML
ECHO LVOT VTI: 16.6 CM
ECHO MV A VELOCITY: 1.03 M/S
ECHO MV AREA PHT: 2.2 CM2
ECHO MV AREA VTI: 1.3 CM2
ECHO MV E DECELERATION TIME (DT): 262.6 MS
ECHO MV E VELOCITY: 0.92 M/S
ECHO MV E/A RATIO: 0.89
ECHO MV LVOT VTI INDEX: 1.98
ECHO MV MAX VELOCITY: 1.1 M/S
ECHO MV MEAN GRADIENT: 2 MMHG
ECHO MV MEAN VELOCITY: 0.7 M/S
ECHO MV PEAK GRADIENT: 5 MMHG
ECHO MV PRESSURE HALF TIME (PHT): 98.5 MS
ECHO MV VTI: 32.9 CM
ECHO PVEIN A DURATION: 99 MS
ECHO PVEIN A VELOCITY: 0.2 M/S
ECHO PVEIN PEAK D VELOCITY: 0.4 M/S
ECHO PVEIN PEAK S VELOCITY: 0.5 M/S
ECHO PVEIN S/D RATIO: 1.3
ECHO RV INTERNAL DIMENSION: 2.2 CM
ECHO RV LONGITUDINAL DIMENSION: 4.5 CM
ECHO RV MID DIMENSION: 2.4 CM

## 2024-05-16 PROCEDURE — 93306 TTE W/DOPPLER COMPLETE: CPT | Performed by: INTERNAL MEDICINE

## 2024-05-16 PROCEDURE — 93306 TTE W/DOPPLER COMPLETE: CPT

## 2024-05-30 DIAGNOSIS — F32.9 REACTIVE DEPRESSION: ICD-10-CM

## 2024-05-30 RX ORDER — PAROXETINE 10 MG/1
10 TABLET, FILM COATED ORAL EVERY MORNING
Qty: 90 TABLET | Refills: 0 | OUTPATIENT
Start: 2024-05-30 | End: 2024-08-28

## 2024-06-05 RX ORDER — FLUTICASONE PROPIONATE 50 MCG
2 SPRAY, SUSPENSION (ML) NASAL DAILY
Qty: 16 G | Refills: 0 | OUTPATIENT
Start: 2024-06-05

## 2024-06-11 DIAGNOSIS — E78.2 MIXED HYPERLIPIDEMIA: ICD-10-CM

## 2024-06-11 RX ORDER — LOVASTATIN 40 MG/1
TABLET ORAL
Qty: 90 TABLET | Refills: 0 | OUTPATIENT
Start: 2024-06-11

## 2024-10-25 PROBLEM — D70.4 CYCLICAL NEUTROPENIA (HCC): Status: ACTIVE | Noted: 2024-10-25

## 2025-01-23 ENCOUNTER — HOSPITAL ENCOUNTER (EMERGENCY)
Age: 85
Discharge: HOME | End: 2025-01-23
Payer: MEDICARE

## 2025-01-23 VITALS
HEART RATE: 81 BPM | WEIGHT: 206 LBS | BODY MASS INDEX: 34.83 KG/M2 | TEMPERATURE: 98.8 F | SYSTOLIC BLOOD PRESSURE: 161 MMHG | RESPIRATION RATE: 20 BRPM | DIASTOLIC BLOOD PRESSURE: 72 MMHG | OXYGEN SATURATION: 98 %

## 2025-01-23 DIAGNOSIS — H55.09 HORIZONTAL NYSTAGMUS: ICD-10-CM

## 2025-01-23 DIAGNOSIS — J01.10 ACUTE FRONTAL SINUSITIS, RECURRENCE NOT SPECIFIED: Primary | ICD-10-CM

## 2025-01-23 DIAGNOSIS — H65.03 BILATERAL ACUTE SEROUS OTITIS MEDIA, RECURRENCE NOT SPECIFIED: ICD-10-CM

## 2025-01-23 PROCEDURE — 99211 OFF/OP EST MAY X REQ PHY/QHP: CPT

## 2025-01-23 RX ORDER — AZITHROMYCIN 250 MG/1
TABLET, FILM COATED ORAL
Qty: 6 TABLET | Refills: 0 | Status: SHIPPED | OUTPATIENT
Start: 2025-01-23 | End: 2025-02-02

## 2025-01-23 RX ORDER — PREDNISONE 20 MG/1
20 TABLET ORAL 2 TIMES DAILY
Qty: 10 TABLET | Refills: 0 | Status: SHIPPED | OUTPATIENT
Start: 2025-01-23 | End: 2025-01-28

## 2025-01-23 ASSESSMENT — PAIN - FUNCTIONAL ASSESSMENT: PAIN_FUNCTIONAL_ASSESSMENT: NONE - DENIES PAIN

## 2025-01-24 NOTE — ED PROVIDER NOTES
08/22/2022); and Knee Arthroplasty (2012, 2020).    Social History:  reports that she has never smoked. She has never used smokeless tobacco. She reports current alcohol use of about 5.0 standard drinks of alcohol per week. She reports that she does not use drugs.    Family History: family history includes Arthritis in her paternal grandfather; Coronary Art Dis in her father, mother, and paternal grandfather; Diabetes in her maternal aunt, maternal cousin, and maternal cousin; Heart Attack in her father and mother; High Blood Pressure in her mother; Osteoarthritis in her mother; Osteoporosis in her mother.     The patient’s home medications have been reviewed.    Allergies: Adhesive tape, Augmentin [amoxicillin-pot clavulanate], Amoxicillin, and Clavulanic acid    -------------------------------------------------- RESULTS -------------------------------------------------  All laboratory and radiology results have been personally reviewed by myself   LABS:  No results found for this visit on 01/23/25.    RADIOLOGY:  Interpreted by Radiologist.  No orders to display       ------------------------- NURSING NOTES AND VITALS REVIEWED ---------------------------   The nursing notes within the ED encounter and vital signs as below have been reviewed.   BP (!) 161/72   Pulse 81   Temp 98.8 °F (37.1 °C)   Resp 20   Wt 93.4 kg (206 lb)   SpO2 98%   BMI 34.83 kg/m²   Oxygen Saturation Interpretation: Normal      ---------------------------------------------------PHYSICAL EXAM--------------------------------------      Constitutional/General: Alert and oriented x3, well appearing, non toxic in NAD  Head: NC/AT.  Tenderness to palpation along the frontal sinuses  Ears: Ear canals clear bilaterally.  Tympanic membranes gray in color with good light reflex.  Fluid noted behind the ears.  Eyes: PERRL, EOMI, visual fields intact.  Conjunctiva pink and moist.  Sclera clear.  No corneal clouding.  No eyelid swelling or

## 2025-03-26 NOTE — TELEPHONE ENCOUNTER
[facility] Hospitalist Progress Note     Patient Info:  Date: 25  [facility] 3 SOUTH ONCOLOGY 324S/01     Name: Ivan Gray Status:  Inpatient   : 1961 MRN: [id number]   Hosp Day: 1 PCP: Ralf Dwyer MD     Code Status: Full Resuscitation  Isolation Status: No active isolations       SUBJECTIVE:  3/26-Patient is complaining of left and mid abdominal pain, endoscopy finding noted, GI requesting H. pylori stool testing, his sugars are running high        14 point Review of systems is negative except for as noted above.      VITALS:  Vital Last Value 24 Hour Range   Temperature 98.2 °F (36.8 °C) (25 0812) Temp  Min: 97.2 °F (36.2 °C)  Max: 98.2 °F (36.8 °C)   Pulse 80 (25 0959) Pulse  Min: 68  Max: 94   Respiratory 18 (25 0812) Resp  Min: 17  Max: 20   Blood Pressure 107/61 (25 0959) BP  Min: 104/69  Max: 146/90   Pulse Oximetry 95 % (25 0812) SpO2  Min: 95 %  Max: 98 %      Today Admitted   Weight (!) 138.5 kg (305 lb 5.4 oz) (25 0400) Weight: (!) 138.5 kg (305 lb 5.4 oz) (25 2300)   Height N/A Height: 5' 9\" (175.3 cm) (25)   BMI N/A BMI (Calculated): 45.09 (25)           PHYSICAL EXAM:  General:AxO3, No acute distress.    Eye:  Pupils are equal, round and reactive to light,  Normal conjunctiva,     HENT:  Normocephalic.    Neck:  Supple, Non-tender.    Respiratory:  Lungs are clear to auscultation.   Chest Wall: No tenderness  Cardiovascular:  Normal rate, Regular rhythm,  No edema    Gastrointestinal:  Soft, Non-tender, Normal bowel sounds.    Genitourinary:  Neg    Integumentary:  Warm.    Neurologic: CN intact, Moving all Ext  Musculoskeletal: bilateral upper and lower ext->WNL  Psychiatric:  Cooperative         I&O    Intake/Output Summary (Last 24 hours) at 3/26/2025 1313  Last data filed at 3/26/2025 0900  Gross per 24 hour   Intake 980 ml   Output --   Net 980 ml          Recent Labs   Lab 25  4096  Mucinex 600 mg po bid 03/25/25 0419 03/24/25 2227 04/29/24  1548   WBC 7.7 6.9 8.1  --    RBC 4.74 4.74 5.10  --    HGB 13.6 13.8 15.3  --    HCT 42.1 41.8 44.8  --     201 236  --    MCV 88.8 88.2 87.8 86.6   MCH 28.7 29.1 30.0 29.1   MCHC 32.3 33.0 34.2 33.6   RDW-CV 14.2 14.3 14.4  --    NRBC 0 0 0  --       No results found No results found for: \"IRON\"    No results for input(s): \"INR\", \"PT\", \"PTT\" in the last 8765 hours.     Recent Labs   Lab 03/26/25 0419 03/25/25 0419 03/24/25 2227   Sodium 135 135 134*   Potassium 4.3 3.9 4.4   Chloride 107 106 107   Carbon Dioxide 23 24 22   Anion Gap 9 9 9   Glucose 264* 229* 321*   BUN 23* 16 16   Creatinine 1.16 1.07 1.10   Glomerular Filtration Rate 72 79 76   Calcium 8.5 9.2 9.3   Magnesium 2.4  --   --    Bilirubin, Total 0.3 0.3 0.4   GOT/AST 17 14 18   GPT 29 25 29   Alkaline Phosphatase 167* 128* 149*   Albumin 2.9* 3.3* 3.5   Globulin 3.7 3.6 4.1*   A/G Ratio 0.8* 0.9* 0.9*      No results for input(s): \"PHOS\", \"GGTP\", \"NH3\", \"LACTA\", \"LIPA\", \"ALC\" in the last 8765 hours.    Recent Labs   Lab 03/25/25  1912 03/25/25 0419 04/29/24  1548   Hemoglobin A1C  --  11.0*  --    CHOLESTEROL  --   --  239*   Cholesterol  --  224*  --    CALCLDL  --  125  --    HDL  --  47  --    TRIGLYCERIDE  --   --  177*   Triglycerides  --  259*  --    TSH 2.720  --  3.71        Accuchecks  Recent Labs   Lab 03/25/25  1329 03/25/25  1628 03/25/25  2052 03/26/25  0744 03/26/25  1139   GLUCOSE BEDSIDE 217* 279* 195* 305* 255*        No results for input(s): \"HTROPI\", \"NTPROB\", \"DDIMER\" in the last 8765 hours.     ABG  No results found     Urine Panel  No results found for: \"UOSM\", \"UK\", \"5UNITR\", \"UCROA\", \"UCL\", \"RONALDO\", \"UKET\", \"USPG\", \"UPROT\", \"UWBC\", \"URBC\", \"UBILI\", \"UPH\", \"UURO\", \"UBACTR\", \"UTPELC\"      Urine Tox Screen  No results for input(s): \"UAMPH\", \"UBAR\", \"UBNZ\", \"UCOCATAC\", \"UOPIA\", \"UPCP\", \"UTHC\", \"FENUS\" in the last 8765 hours.     Cultures:  Microbiology Results       None              No results found for: \"XMHVJYK3RFR\", \"SOKJUXDF6YXY\", \"RSVPCR\", \"BFLUA\", \"BFLUB\", \"BADENO\", \"CDPCR\"   Hepatitis profile  No results for input(s): \"AIGMA\", \"HAINT\", \"HBAGA\", \"HEPCM\", \"HBINT\", \"HCV\" in the last 8765 hours.    RADIOLOGY  Esophagogastroduodenoscopy (EGD)   Final Result               CT:  No results found for this or any previous visit.       MRI:  === 20 ===    MRI ADVOCATE PROCEDURE    - Impression -  1.  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  Allowing for this, there is degenerative spondylosis level L4-L5, L3-L4, L2-L3 and L1-L2 as  described above.    -----  F I N A L  -----    Transcribed By: MAGDIEL  20 2:13 pm    Dictated By:            YARELY STINSON MD    Electronically Reviewed and Approved By:           YARELY STINSON MD  20 2:17 pm     Nuclear Medicine:  No results found for this or any previous visit.  No results found for this or any previous visit.     Mammo:  No results found for this or any previous visit.     Echocardiogram:  No results found for this or any previous visit.       Stress test:  No results found for this or any previous visit.       Cardiac: Cath Report:    No results found for this or any previous visit.     Cardiac:  ECG:   No results found for this or any previous visit (from the past 4464 hour(s)).       Pathology Report  Lab Results   Component Value Date    PATHREPORT  2019     Name: NUNO RUIZ          MRN:     [id number]    /Age:1961 (Age: 57)           Visit#:  [id number]    Sex:M                        Surgical Pathology Report        Client: [facility]                      Submitting Physician: Reggie Garcia MD        Date Specimen Collected: 19             Accession #:  [id number]    Date Specimen Received:  19                 Date  Reported:           6/5/2019 13:23      Location:  [facility]        ______________________________________________________________________________    Pathologic Diagnosis :    A: Transverse colon polyp; colonoscopy:    -  Adenomatous polyp tissue.        Dru Keen M.D.    ** Electronic Signature (RMM) 6/5/2019 13:23 **    ______________________________________________________________________________        Specimen(s) Submitted:     Transverse colon polyp        Pre-Operative Diagnosis:    Abd pain        Post-Operative Diagnosis:    Polyp        Gross Description:    A: The specimen is received in formalin labeled \"transverse colon polyp\" and    consists of two pieces of pink-tan tissue respectively measuring 0.3 cm and    0.4 cm.  Sections submitted:         A1: entire specimen in screen cassette        WMT 6/4/2019 12:08 PM             Microscopic Description:    A.  Adenomatous polyp tissue is seen, with tubular glands lined by columnar    cells exhibiting nuclear elongation and hyperchromasia.        RMM/rmm 06/05/19        The attending pathologist whose signature appears on this report has reviewed    the diagnostic studies and has edited the gross and/or microscopic portion of    the report rendering the final diagnosis.        The immunohistochemical, FISH, or KURT reagents (if any) utilized in this test    were developed and their performance characteristics determined by [facility]    [facility].  Some of the immunohistochemical reagents have not been cleared    or approved by the U.S. Food and Drug Administration. The FDA has determined    that such clearance or approval is not necessary.  This test is used for    clinical purposes.  It should not be regarded as investigational or for    research.  This laboratory is certified under the Clinical Laboratory    Improvement Amendments of 1988 (CLIA) as qualified to perform high complexity    clinical laboratory testing.  The appropriate controls were run  and show    appropriate reactivity. Since FISH and/or immunohistochemistry for estrogen    receptor, progesterone receptor, and HER2/maira have not been validated on    decalcified tissue, such results should be interpreted with caution given the    likelihood of false negativity.        Fee Codes:     A: [id number], [id number]        Performing Lab Location (Unless otherwise specified):    [address] [address]            Test Results Pending         I personally reviewed the patient's imaging, radiology report(s).    Assessment & Plan      Patient Active Problem List   Diagnosis    Radiculopathy, lumbar region    Insomnia with sleep apnea    History of HPV infection    Dermatitis    Melena    Right upper quadrant abdominal pain    PUD (peptic ulcer disease)    Type 2 diabetes mellitus with diabetic neuropathy, with long-term current use of insulin (CMD)    Primary hypertension            HPI @ Admission:  Ivan Gray is a 63 year old male admitted on 3/24/2025  9:44 PM for PCP:Pcp, Verify #  #62 yo M with a PMHx of T2DM, HTN, HLD, PUD who presents as a transfer from an OSH for abdominal pain and dark stools.  Patient reports that he ran out of his pantoprazole medication a while ago and he has been taking Pepcid daily for some time now.  Over the past week he has been experiencing RUQ, epigastric abdominal pain.  Pain has been constant.  He is also endorsing dark stools daily, stools are soft but formed.  States his symptoms started after drinking beer 1 week ago.  Denies any NSAID use.  States that he has a history of a stomach ulcer in the past.     Upon admission to the OSH, his vital signs were stable, labs stable including hemoglobin.  Given need for GI evaluation, patient was transferred [place] [facility] [place] [facility].      #All the following conditions PRESENT ON ADMISSION.     #62 yo M with a PMHx of T2DM, HTN, HLD, PUD who presents as a transfer from an OSH for  abdominal pain and dark stools.      # RUQ abdominal pain  # Dark stools  # Hx of PUD  - GI on consult  - Continue n.p.o.  - Continue IV PPI twice daily  - Trend hemoglobin, transfuse to maintain Hgb >7  - EGD  -Findings & Interventions  ? Moderate, generalized edematous, erythematous and granular mucosa in the body of the stomach and antrum, consistent with gastritis. Initially planned to biopsy stomach to rule out H pylori, but due to persistent retching, did not feel this was safe.  ? Single 5 mm x 7 mm superficial, round, benign-appearing ulcer in the antrum with clean base (Saud III)  ? The esophagus and duodenum appeared normal.  ? Other       # T2DM uncontrolled  # Hyperglycemia  - Home regimen with metformin, insulin 70/30, Victoza, glimepiride  - Start Lantus 10 units nightly, low-dose sliding scale while NPO  -Endocrinology managing uncontrolled blood sugars  - Continue home gabapentin     # HTN  - Hold home lisinopril for now    #Hyponatremia-Monitor  Sodium (mmol/L)   Date Value   03/26/2025 135   03/25/2025 135   03/24/2025 134 (L)           #Replete electrolytes as needed  Maintain potassium above 4 and magnesium above 2  Recent Labs   Lab 03/26/25  0419 03/25/25  0419 03/24/25  2227   Potassium 4.3 3.9 4.4   Magnesium 2.4  --   --        #Mild protein calorie malnutrition  Albumin (g/dL)   Date Value   03/26/2025 2.9 (L)      Body mass index is 45.09 kg/m².  Nutrition consult    #Diet: Consistent Carb Moderate (45-75 Gm/Meal); Yes, Medical Nutrition Management by Rd (Registered Dietitian) Diet     #DVT prophylaxis:     #Imaging: reports reviewed    #Labs: personally reviewed.    #Code status:   Code Status: Full Resuscitation    #Ralf Dwyer MD   informed by Message Routing system of Epic      #Disposition:  Patient's phone # [phone]   Active Substitute Decision Maker (SDM)    There are no active Substitute Decision Maker (SDM) on file.          Prior to ED visit patient was  living with Alone and residing at House.  03/26/25 Care discussed with the RN, consultant and bienvenido.   #       #PCP: Ralf Dwyer MD      Inpatient Medications Reviewed:  Allergies  ALLERGIES:  Patient has no known allergies.    Regular Medications  Current Facility-Administered Medications   Medication Dose Route Frequency Provider Last Rate Last Admin    pantoprazole (PROTONIX) EC tablet 40 mg  40 mg Oral 2 times per day Mayelin Bhakta, CNP        insulin glargine (LANTUS) injection 26 Units  26 Units Subcutaneous Nightly Loretta Key MD        insulin lispro (ADMELOG, HumaLOG) injection 10 Units  10 Units Subcutaneous TID WC Loretta Key MD   10 Units at 03/26/25 1200    insulin glargine (LANTUS) injection 6 Units  6 Units Subcutaneous Once Loretta Key MD        insulin lispro (ADMELOG,HumaLOG) - Correction Dose   Subcutaneous TID  Estephania Carney MD   6 Units at 03/26/25 1201    insulin lispro (ADMELOG,HumaLOG) - Correction Dose   Subcutaneous Nightly Estephania Carney MD        NIFEdipine XL (PROCARDIA XL) ER tablet 30 mg  30 mg Oral Daily Fred Morris MD   30 mg at 03/26/25 0818    hydroCHLOROthiazide tablet 25 mg  25 mg Oral Daily Fred Morris MD   25 mg at 03/26/25 0818    Potassium Standard Replacement Protocol (Levels 3.5 and lower)   Does not apply See Admin Instructions Tania, Pat, DO        Magnesium Standard Replacement Protocol   Does not apply See Admin Instructions Ali, Pat, DO        Phosphorus Standard Replacement Protocol   Does not apply See Admin Instructions Ali, Pat, DO        sodium chloride 0.9 % injection 2 mL  2 mL Intracatheter 2 times per day Ali, Pat, DO   2 mL at 03/26/25 0822          IV-Infusions  Current Facility-Administered Medications   Medication Dose Route Frequency Provider Last Rate Last Admin           PRN-Medications  Current Facility-Administered Medications   Medication Dose Route Frequency Provider Last Rate Last Admin    dextrose 50 %  injection 25 g  25 g Intravenous PRN Ali, Pat, DO        dextrose 50 % injection 12.5 g  12.5 g Intravenous PRN Ali, Pat, DO        glucagon (GLUCAGEN) injection 1 mg  1 mg Intramuscular PRN Ali, Pat, DO        dextrose (GLUTOSE) 40 % gel 15 g  15 g Oral PRN Ali, Pat, DO        dextrose (GLUTOSE) 40 % gel 30 g  30 g Oral PRN Ali, Pat, DO        gabapentin (NEURONTIN) capsule 300 mg  300 mg Oral Daily PRN Ali, Pat, DO   300 mg at 03/26/25 0828    HYDROcodone-acetaminophen (NORCO) 5-325 MG per tablet 1 tablet  1 tablet Oral Q4H PRN Fred Morris MD   1 tablet at 03/26/25 0817    morphine injection 4 mg  4 mg Intravenous Q4H PRN Fred Morris MD   4 mg at 03/26/25 1201    acetaminophen (TYLENOL) tablet 650 mg  650 mg Oral Q4H PRN Ali, Pat, DO   650 mg at 03/25/25 1346    Or    acetaminophen (TYLENOL) suppository 650 mg  650 mg Rectal Q4H PRN Ali, Pat, DO        ondansetron (ZOFRAN ODT) disintegrating tablet 4 mg  4 mg Oral Q6H PRN Ali, Pat, DO        Or    ondansetron (ZOFRAN) injection 4 mg  4 mg Intravenous Q6H PRN Ali, Pat, DO        polyethylene glycol (MIRALAX) packet 17 g  17 g Oral Daily PRN Ali, Pat, DO        docusate sodium-sennosides (SENOKOT S) 50-8.6 MG 2 tablet  2 tablet Oral BID PRN Ali, Pat, DO   2 tablet at 03/26/25 1201    bisacodyl (DULCOLAX) suppository 10 mg  10 mg Rectal Daily PRN Ali, Pat, DO        melatonin tablet 6 mg  6 mg Oral Nightly PRN Ali, Pat, DO   6 mg at 03/25/25 2102    sodium chloride 0.9 % injection 10 mL  10 mL Intravenous PRN Ali, Pat, DO            CONSULTS  IP Consult Orders (From admission, onward)       Start     Ordered    03/25/25 1045  Inpatient consult to Endocrinology  ONE TIME        Provider:  Simona Feliciano MD    03/25/25 1045    03/25/25 0629  Inpatient consult to GI  ONE TIME        Provider:  Daquan Noriega MD    03/25/25 0629                  PCP: Ralf Dwyer MD            #Total time spent was more than 50 mins. on this  patient's care today. This includes the following: Reviewed all vitals, medications, new orders, I/O, labs, micro, radiology, nurses notes, pertinent consultant notes which are reflected in assessment and plan.This does not include time spent on other items of care such as smoking cessation counseling, prolonged care time, and or advanced care planning if applicable.      MD MU Chambers Hospitalist   3/26/2025

## 2025-04-16 ENCOUNTER — APPOINTMENT (OUTPATIENT)
Dept: GENERAL RADIOLOGY | Age: 85
DRG: 603 | End: 2025-04-16
Payer: MEDICARE

## 2025-04-16 ENCOUNTER — HOSPITAL ENCOUNTER (EMERGENCY)
Age: 85
Discharge: HOME | DRG: 603 | End: 2025-04-16
Payer: MEDICARE

## 2025-04-16 ENCOUNTER — HOSPITAL ENCOUNTER (INPATIENT)
Age: 85
LOS: 3 days | Discharge: HOME | DRG: 603 | End: 2025-04-19
Attending: EMERGENCY MEDICINE | Admitting: INTERNAL MEDICINE
Payer: MEDICARE

## 2025-04-16 VITALS
RESPIRATION RATE: 18 BRPM | WEIGHT: 200 LBS | BODY MASS INDEX: 33.28 KG/M2 | HEART RATE: 79 BPM | SYSTOLIC BLOOD PRESSURE: 143 MMHG | OXYGEN SATURATION: 98 % | DIASTOLIC BLOOD PRESSURE: 94 MMHG | TEMPERATURE: 97.1 F

## 2025-04-16 DIAGNOSIS — L03.114 CELLULITIS OF LEFT WRIST: Primary | ICD-10-CM

## 2025-04-16 DIAGNOSIS — M19.032 OSTEOARTHRITIS OF LEFT WRIST, UNSPECIFIED OSTEOARTHRITIS TYPE: ICD-10-CM

## 2025-04-16 DIAGNOSIS — M25.532 WRIST PAIN, ACUTE, LEFT: Primary | ICD-10-CM

## 2025-04-16 DIAGNOSIS — M11.20 PSEUDOGOUT: ICD-10-CM

## 2025-04-16 DIAGNOSIS — M25.532 LEFT WRIST PAIN: ICD-10-CM

## 2025-04-16 LAB
ALBUMIN SERPL-MCNC: 4.3 G/DL (ref 3.5–5.2)
ALP SERPL-CCNC: 113 U/L (ref 35–104)
ALT SERPL-CCNC: 13 U/L (ref 0–32)
ANION GAP SERPL CALCULATED.3IONS-SCNC: 14 MMOL/L (ref 7–16)
AST SERPL-CCNC: 15 U/L (ref 0–31)
BASOPHILS # BLD: 0.03 K/UL (ref 0–0.2)
BASOPHILS NFR BLD: 0 % (ref 0–2)
BILIRUB SERPL-MCNC: 0.7 MG/DL (ref 0–1.2)
BUN SERPL-MCNC: 9 MG/DL (ref 6–23)
CALCIUM SERPL-MCNC: 9.8 MG/DL (ref 8.6–10.2)
CHLORIDE SERPL-SCNC: 100 MMOL/L (ref 98–107)
CO2 SERPL-SCNC: 25 MMOL/L (ref 22–29)
CREAT SERPL-MCNC: 1 MG/DL (ref 0.5–1)
CRP SERPL HS-MCNC: 46.4 MG/L (ref 0–5)
EOSINOPHIL # BLD: 0.01 K/UL (ref 0.05–0.5)
EOSINOPHILS RELATIVE PERCENT: 0 % (ref 0–6)
ERYTHROCYTE [DISTWIDTH] IN BLOOD BY AUTOMATED COUNT: 13.7 % (ref 11.5–15)
ERYTHROCYTE [SEDIMENTATION RATE] IN BLOOD BY WESTERGREN METHOD: 49 MM/HR (ref 0–20)
GFR, ESTIMATED: 59 ML/MIN/1.73M2
GLUCOSE SERPL-MCNC: 118 MG/DL (ref 74–99)
HCT VFR BLD AUTO: 43.6 % (ref 34–48)
HGB BLD-MCNC: 14.1 G/DL (ref 11.5–15.5)
IMM GRANULOCYTES # BLD AUTO: 0.03 K/UL (ref 0–0.58)
IMM GRANULOCYTES NFR BLD: 0 % (ref 0–5)
LACTATE BLDV-SCNC: 1.5 MMOL/L (ref 0.5–2.2)
LYMPHOCYTES NFR BLD: 0.75 K/UL (ref 1.5–4)
LYMPHOCYTES RELATIVE PERCENT: 9 % (ref 20–42)
MCH RBC QN AUTO: 28.6 PG (ref 26–35)
MCHC RBC AUTO-ENTMCNC: 32.3 G/DL (ref 32–34.5)
MCV RBC AUTO: 88.4 FL (ref 80–99.9)
MONOCYTES NFR BLD: 0.51 K/UL (ref 0.1–0.95)
MONOCYTES NFR BLD: 6 % (ref 2–12)
NEUTROPHILS NFR BLD: 84 % (ref 43–80)
NEUTS SEG NFR BLD: 7.13 K/UL (ref 1.8–7.3)
PLATELET # BLD AUTO: 246 K/UL (ref 130–450)
PMV BLD AUTO: 9.8 FL (ref 7–12)
POTASSIUM SERPL-SCNC: 3.8 MMOL/L (ref 3.5–5)
PROT SERPL-MCNC: 7.6 G/DL (ref 6.4–8.3)
RBC # BLD AUTO: 4.93 M/UL (ref 3.5–5.5)
SODIUM SERPL-SCNC: 139 MMOL/L (ref 132–146)
URATE SERPL-MCNC: 5.2 MG/DL (ref 2.4–5.7)
WBC OTHER # BLD: 8.5 K/UL (ref 4.5–11.5)

## 2025-04-16 PROCEDURE — 86140 C-REACTIVE PROTEIN: CPT

## 2025-04-16 PROCEDURE — 99211 OFF/OP EST MAY X REQ PHY/QHP: CPT

## 2025-04-16 PROCEDURE — 6370000000 HC RX 637 (ALT 250 FOR IP): Performed by: EMERGENCY MEDICINE

## 2025-04-16 PROCEDURE — 2500000003 HC RX 250 WO HCPCS: Performed by: EMERGENCY MEDICINE

## 2025-04-16 PROCEDURE — 6370000000 HC RX 637 (ALT 250 FOR IP)

## 2025-04-16 PROCEDURE — 84550 ASSAY OF BLOOD/URIC ACID: CPT

## 2025-04-16 PROCEDURE — 6370000000 HC RX 637 (ALT 250 FOR IP): Performed by: INTERNAL MEDICINE

## 2025-04-16 PROCEDURE — 6360000002 HC RX W HCPCS: Performed by: EMERGENCY MEDICINE

## 2025-04-16 PROCEDURE — 2500000003 HC RX 250 WO HCPCS: Performed by: INTERNAL MEDICINE

## 2025-04-16 PROCEDURE — 20605 DRAIN/INJ JOINT/BURSA W/O US: CPT

## 2025-04-16 PROCEDURE — 0R9P3ZX DRAINAGE OF LEFT WRIST JOINT, PERCUTANEOUS APPROACH, DIAGNOSTIC: ICD-10-PCS | Performed by: EMERGENCY MEDICINE

## 2025-04-16 PROCEDURE — 85025 COMPLETE CBC W/AUTO DIFF WBC: CPT

## 2025-04-16 PROCEDURE — 89060 EXAM SYNOVIAL FLUID CRYSTALS: CPT

## 2025-04-16 PROCEDURE — 87070 CULTURE OTHR SPECIMN AEROBIC: CPT

## 2025-04-16 PROCEDURE — 83605 ASSAY OF LACTIC ACID: CPT

## 2025-04-16 PROCEDURE — 85652 RBC SED RATE AUTOMATED: CPT

## 2025-04-16 PROCEDURE — 87040 BLOOD CULTURE FOR BACTERIA: CPT

## 2025-04-16 PROCEDURE — 99285 EMERGENCY DEPT VISIT HI MDM: CPT

## 2025-04-16 PROCEDURE — 73110 X-RAY EXAM OF WRIST: CPT

## 2025-04-16 PROCEDURE — 1200000000 HC SEMI PRIVATE

## 2025-04-16 PROCEDURE — 96374 THER/PROPH/DIAG INJ IV PUSH: CPT

## 2025-04-16 PROCEDURE — 2580000003 HC RX 258

## 2025-04-16 PROCEDURE — 87205 SMEAR GRAM STAIN: CPT

## 2025-04-16 PROCEDURE — 80053 COMPREHEN METABOLIC PANEL: CPT

## 2025-04-16 PROCEDURE — 2580000003 HC RX 258: Performed by: EMERGENCY MEDICINE

## 2025-04-16 RX ORDER — ACETAMINOPHEN 325 MG/1
TABLET ORAL
Status: COMPLETED
Start: 2025-04-16 | End: 2025-04-16

## 2025-04-16 RX ORDER — PREDNISONE 20 MG/1
60 TABLET ORAL ONCE
Status: COMPLETED | OUTPATIENT
Start: 2025-04-16 | End: 2025-04-16

## 2025-04-16 RX ORDER — GABAPENTIN 300 MG/1
300 CAPSULE ORAL EVERY 8 HOURS
Status: DISCONTINUED | OUTPATIENT
Start: 2025-04-16 | End: 2025-04-19 | Stop reason: HOSPADM

## 2025-04-16 RX ORDER — LIDOCAINE HYDROCHLORIDE 10 MG/ML
5 INJECTION, SOLUTION INFILTRATION; PERINEURAL ONCE
Status: COMPLETED | OUTPATIENT
Start: 2025-04-16 | End: 2025-04-16

## 2025-04-16 RX ORDER — PREDNISONE 20 MG/1
TABLET ORAL
Status: COMPLETED
Start: 2025-04-16 | End: 2025-04-16

## 2025-04-16 RX ORDER — GLUCAGON 1 MG/ML
1 KIT INJECTION PRN
Status: DISCONTINUED | OUTPATIENT
Start: 2025-04-16 | End: 2025-04-19 | Stop reason: HOSPADM

## 2025-04-16 RX ORDER — SODIUM CHLORIDE 9 MG/ML
INJECTION, SOLUTION INTRAVENOUS
Status: COMPLETED
Start: 2025-04-16 | End: 2025-04-16

## 2025-04-16 RX ORDER — DEXTROSE MONOHYDRATE 100 MG/ML
INJECTION, SOLUTION INTRAVENOUS CONTINUOUS PRN
Status: DISCONTINUED | OUTPATIENT
Start: 2025-04-16 | End: 2025-04-19 | Stop reason: HOSPADM

## 2025-04-16 RX ORDER — POLYETHYLENE GLYCOL 3350 17 G/17G
17 POWDER, FOR SOLUTION ORAL DAILY PRN
Status: DISCONTINUED | OUTPATIENT
Start: 2025-04-16 | End: 2025-04-19 | Stop reason: HOSPADM

## 2025-04-16 RX ORDER — ACETAMINOPHEN 325 MG/1
650 TABLET ORAL EVERY 6 HOURS PRN
Status: DISCONTINUED | OUTPATIENT
Start: 2025-04-16 | End: 2025-04-19 | Stop reason: HOSPADM

## 2025-04-16 RX ORDER — ENOXAPARIN SODIUM 100 MG/ML
40 INJECTION SUBCUTANEOUS DAILY
Status: DISCONTINUED | OUTPATIENT
Start: 2025-04-17 | End: 2025-04-19 | Stop reason: HOSPADM

## 2025-04-16 RX ORDER — POTASSIUM CHLORIDE 1500 MG/1
40 TABLET, EXTENDED RELEASE ORAL PRN
Status: DISCONTINUED | OUTPATIENT
Start: 2025-04-16 | End: 2025-04-19 | Stop reason: HOSPADM

## 2025-04-16 RX ORDER — OXYCODONE AND ACETAMINOPHEN 5; 325 MG/1; MG/1
1 TABLET ORAL ONCE
Refills: 0 | Status: COMPLETED | OUTPATIENT
Start: 2025-04-16 | End: 2025-04-16

## 2025-04-16 RX ORDER — POTASSIUM CHLORIDE 7.45 MG/ML
10 INJECTION INTRAVENOUS PRN
Status: DISCONTINUED | OUTPATIENT
Start: 2025-04-16 | End: 2025-04-19 | Stop reason: HOSPADM

## 2025-04-16 RX ORDER — SODIUM CHLORIDE 9 MG/ML
INJECTION, SOLUTION INTRAVENOUS PRN
Status: DISCONTINUED | OUTPATIENT
Start: 2025-04-16 | End: 2025-04-19 | Stop reason: HOSPADM

## 2025-04-16 RX ORDER — GABAPENTIN 300 MG/1
300 CAPSULE ORAL 3 TIMES DAILY PRN
Status: ON HOLD | COMMUNITY
End: 2025-04-19 | Stop reason: HOSPADM

## 2025-04-16 RX ORDER — ACETAMINOPHEN 325 MG/1
650 TABLET ORAL ONCE
Status: COMPLETED | OUTPATIENT
Start: 2025-04-16 | End: 2025-04-16

## 2025-04-16 RX ORDER — MAGNESIUM SULFATE IN WATER 40 MG/ML
2000 INJECTION, SOLUTION INTRAVENOUS PRN
Status: DISCONTINUED | OUTPATIENT
Start: 2025-04-16 | End: 2025-04-19 | Stop reason: HOSPADM

## 2025-04-16 RX ORDER — SODIUM CHLORIDE 0.9 % (FLUSH) 0.9 %
5-40 SYRINGE (ML) INJECTION EVERY 12 HOURS SCHEDULED
Status: DISCONTINUED | OUTPATIENT
Start: 2025-04-16 | End: 2025-04-19 | Stop reason: HOSPADM

## 2025-04-16 RX ORDER — PREDNISONE 10 MG/1
40 TABLET ORAL DAILY
Qty: 20 TABLET | Refills: 0 | Status: SHIPPED | OUTPATIENT
Start: 2025-04-17 | End: 2025-04-22

## 2025-04-16 RX ORDER — LATANOPROST 50 UG/ML
1 SOLUTION/ DROPS OPHTHALMIC NIGHTLY
Status: DISCONTINUED | OUTPATIENT
Start: 2025-04-16 | End: 2025-04-19 | Stop reason: HOSPADM

## 2025-04-16 RX ORDER — SODIUM CHLORIDE 0.9 % (FLUSH) 0.9 %
5-40 SYRINGE (ML) INJECTION PRN
Status: DISCONTINUED | OUTPATIENT
Start: 2025-04-16 | End: 2025-04-19 | Stop reason: HOSPADM

## 2025-04-16 RX ORDER — ACETAMINOPHEN 650 MG/1
650 SUPPOSITORY RECTAL EVERY 6 HOURS PRN
Status: DISCONTINUED | OUTPATIENT
Start: 2025-04-16 | End: 2025-04-19 | Stop reason: HOSPADM

## 2025-04-16 RX ADMIN — WATER 1000 MG: 1 INJECTION INTRAMUSCULAR; INTRAVENOUS; SUBCUTANEOUS at 21:26

## 2025-04-16 RX ADMIN — GABAPENTIN 300 MG: 300 CAPSULE ORAL at 23:39

## 2025-04-16 RX ADMIN — SODIUM CHLORIDE 50 ML: 9 INJECTION, SOLUTION INTRAVENOUS at 21:26

## 2025-04-16 RX ADMIN — PREDNISONE 60 MG: 20 TABLET ORAL at 17:22

## 2025-04-16 RX ADMIN — VANCOMYCIN HYDROCHLORIDE 1750 MG: 10 INJECTION, POWDER, LYOPHILIZED, FOR SOLUTION INTRAVENOUS at 23:49

## 2025-04-16 RX ADMIN — LIDOCAINE HYDROCHLORIDE 5 ML: 10 INJECTION, SOLUTION INFILTRATION; PERINEURAL at 19:42

## 2025-04-16 RX ADMIN — ACETAMINOPHEN 650 MG: 325 TABLET ORAL at 17:22

## 2025-04-16 RX ADMIN — Medication 10 ML: at 23:48

## 2025-04-16 RX ADMIN — OXYCODONE HYDROCHLORIDE AND ACETAMINOPHEN 1 TABLET: 5; 325 TABLET ORAL at 20:16

## 2025-04-16 ASSESSMENT — PAIN DESCRIPTION - ORIENTATION
ORIENTATION: RIGHT
ORIENTATION: LEFT

## 2025-04-16 ASSESSMENT — PAIN DESCRIPTION - FREQUENCY
FREQUENCY: CONTINUOUS
FREQUENCY: CONTINUOUS

## 2025-04-16 ASSESSMENT — PAIN SCALES - GENERAL
PAINLEVEL_OUTOF10: 9
PAINLEVEL_OUTOF10: 8
PAINLEVEL_OUTOF10: 8
PAINLEVEL_OUTOF10: 9
PAINLEVEL_OUTOF10: 2

## 2025-04-16 ASSESSMENT — PAIN DESCRIPTION - DESCRIPTORS
DESCRIPTORS: SHARP;ACHING
DESCRIPTORS: ACHING;SORE

## 2025-04-16 ASSESSMENT — PAIN - FUNCTIONAL ASSESSMENT
PAIN_FUNCTIONAL_ASSESSMENT: 0-10

## 2025-04-16 ASSESSMENT — PAIN DESCRIPTION - ONSET: ONSET: ON-GOING

## 2025-04-16 ASSESSMENT — PAIN DESCRIPTION - LOCATION
LOCATION: WRIST
LOCATION: HAND

## 2025-04-16 ASSESSMENT — PAIN DESCRIPTION - PAIN TYPE
TYPE: ACUTE PAIN
TYPE: ACUTE PAIN

## 2025-04-16 NOTE — ED PROVIDER NOTES
Independent MICAH Visit.       [facility] URGENT CARE  ED  Encounter Note  Admit Date/RoomTime: 2025 11:58 AM  ED Room:   NAME: Deborah Iglesias  : 1940  MRN: [id number]  PCP: Nevin Siegel, APRN - CNP    CHIEF COMPLAINT     Wrist Pain (Left wrist/hand pain for 3 days, itchy & slight swelling)    HISTORY OF PRESENT ILLNESS        Deborah Iglesias is a 84 y.o. female who presents to the ED left wrist pain that started 3 days ago. Pt states her wrist is itching and swelling and is unable to move her wrist without intense pain. Pt denies injury, and denies insects bite. States that her pain is getting worse and nothing is relieving it.  Patient does state that she has had fevers and chills, denies nausea vomiting.    REVIEW OF SYSTEMS     Pertinent positives and negatives are stated within HPI, all other systems reviewed and are negative.    Past Medical History:  has a past medical history of Acid reflux disease, Anticoagulant long-term use, Anxiety, Arthritis, Central retinal vein occlusion of right eye (HCC), Glaucoma, left eye, History of fracture of femur, History of trigeminal neuralgia, Hyperlipidemia, Hypertension, Hypoglycemia, Irritable bowel syndrome, Macular degeneration, right eye, Osteoarthritis, Right knee pain, Sleep apnea, Symptoms consistent with irritable bowel syndrome, Use of cane as ambulatory aid, and Wears glasses.  Surgical History:  has a past surgical history that includes Tonsillectomy; Breast lumpectomy; Cataract removal with implant (Right, ); other surgical history; joint replacement; Total knee arthroplasty (Bilateral, ); Cholecystectomy (); Breast biopsy (Bilateral,  right ,  left); Revision total knee arthroplasty (Right, 2020); US BREAST BIOPSY W LOC DEVICE 1ST LESION LEFT (Left, 2022); and Knee Arthroplasty (, ).  Social History:  reports that she has never smoked. She has never used smokeless tobacco. She reports current

## 2025-04-16 NOTE — DISCHARGE INSTRUCTIONS
Your information:  Name: Deborah Iglesias  : 1940    Your instructions:    YOU ARE BEING DISCHARGED HOME. PLEASE MAKE AND KEEP YOUR FOLLOW UP APPOINTMENTS.    What to do after you leave the hospital:    Recommended diet: regular diet    Recommended activity: activity as tolerated    IF YOU EXPERIENCE ANY OF THE FOLLOWING SYMPTOMS, CHEST PAIN, SHORTNESS OF BREATH, COUGHING UP BLOOD OR BLOODY SPUTUM, STOMACH PAIN OR CRAMPING, DARK, TARRY STOOLS, LOSS OF APPETITE, GENERAL NOT FEELING WELL, SIGNS AND SYMPTOMS OF INFECTION LIKE FEVER AND OR CHILLS, PLEASE CALL     Nevin Siegel APRN - CNP    OR RETURN TO THE EMERGENCY ROOM.    The following personal items were collected during your admission and were returned to you:    Belongings  Dental Appliances: None  Vision - Corrective Lenses: Eyeglasses  Hearing Aid: None  Clothing: Pants, Shirt, Socks, Undergarments, Footwear  Jewelry: Ring  Electronic Devices: Cell Phone  Weapons (Notify Protective Services/Security): None  Home Medications: None  Valuables Given To: Patient  Provide Name(s) of Who Valuable(s) Were Given To: Deborah    Information obtained by:  By signing below, I understand that if any problems occur once I leave the hospital I am to contact     Nevin Siegel APRN - CNP   Or go to emergency room.  I understand and acknowledge receipt of the instructions indicated above.

## 2025-04-16 NOTE — ED PROVIDER NOTES
ATTENDING PROVIDER ATTESTATION:     Deborah Iglesias presented to the emergency department for evaluation of Joint Swelling (Patient sent in from  for right wrist joint pain for evaluation of septic joint, patient is not currently on antibiotics)    I have reviewed and discussed the case, including pertinent history (medical, surgical, family and social) and exam findings with the Midlevel and the Nurse assigned to Deborah Iglesias.  I have personally performed and/or participated in the history, exam, medical decision making, and procedures and agree with all pertinent clinical information. I personally reviewed any EKG obtained and agree with the midlevel provider's interpretation unless stated otherwise below.   I have personally performed a substantive portion of the encounter.    ED Course as of 04/16/25 2138 Wed Apr 16, 2025 1657 Patient presents with progressively worsening left wrist pain for the last few days.  Pain is on the dorsal aspect of the wrist.  Started after she had been looking at her phone and after she put her phone down.  She does report some chills.  She states that the pain radiates proximally into her forearm as well as distally into her fingers.  No numbness or tingling in the extremities.  No known history of gout.  She does have previous knee surgery by Dr. Ramirez.  No associated fever.  No recent falls or injuries.  She does drink a glass of wine a few days a week.  No recent dietary changes.  On exam patient has some mild erythema, swelling and tenderness to palpation of the dorsal aspect of the wrist on the radial side.  No induration or crepitus or fluctuance.  2+ radial/ulnar pulses.  Pain with  strength.  AIN/PIN/IO intact.  Sensation intact to light touch throughout the extremity.  Initial lab work notable for ESR of 49.  No leukocytosis, however there is a neutrophilia.  Plan to obtain CRP, consult orthopedic surgery, and follow-up on the recommendations [SS]   2038 Still

## 2025-04-17 ENCOUNTER — APPOINTMENT (OUTPATIENT)
Dept: GENERAL RADIOLOGY | Age: 85
DRG: 603 | End: 2025-04-17
Payer: MEDICARE

## 2025-04-17 PROBLEM — M11.232 PSEUDOGOUT OF LEFT WRIST: Status: ACTIVE | Noted: 2025-04-17

## 2025-04-17 LAB
ALBUMIN SERPL-MCNC: 3.5 G/DL (ref 3.5–5.2)
ALBUMIN SERPL-MCNC: 4.2 G/DL (ref 3.5–5.2)
ALP SERPL-CCNC: 118 U/L (ref 35–104)
ALP SERPL-CCNC: 91 U/L (ref 35–104)
ALT SERPL-CCNC: 11 U/L (ref 0–32)
ALT SERPL-CCNC: 14 U/L (ref 0–32)
ANION GAP SERPL CALCULATED.3IONS-SCNC: 11 MMOL/L (ref 7–16)
ANION GAP SERPL CALCULATED.3IONS-SCNC: 19 MMOL/L (ref 7–16)
AST SERPL-CCNC: 16 U/L (ref 0–31)
AST SERPL-CCNC: 19 U/L (ref 0–31)
BASOPHILS # BLD: 0 K/UL (ref 0–0.2)
BASOPHILS # BLD: 0 K/UL (ref 0–0.2)
BASOPHILS NFR BLD: 0 % (ref 0–2)
BASOPHILS NFR BLD: 0 % (ref 0–2)
BILIRUB SERPL-MCNC: 0.3 MG/DL (ref 0–1.2)
BILIRUB SERPL-MCNC: 0.5 MG/DL (ref 0–1.2)
BUN SERPL-MCNC: 11 MG/DL (ref 6–23)
BUN SERPL-MCNC: 13 MG/DL (ref 6–23)
CALCIUM SERPL-MCNC: 9 MG/DL (ref 8.6–10.2)
CALCIUM SERPL-MCNC: 9.8 MG/DL (ref 8.6–10.2)
CHLORIDE SERPL-SCNC: 101 MMOL/L (ref 98–107)
CHLORIDE SERPL-SCNC: 108 MMOL/L (ref 98–107)
CO2 SERPL-SCNC: 20 MMOL/L (ref 22–29)
CO2 SERPL-SCNC: 21 MMOL/L (ref 22–29)
CREAT SERPL-MCNC: 0.9 MG/DL (ref 0.5–1)
CREAT SERPL-MCNC: 1.1 MG/DL (ref 0.5–1)
CRYSTALS FLD MICRO: ABNORMAL
EOSINOPHIL # BLD: 0 K/UL (ref 0.05–0.5)
EOSINOPHIL # BLD: 0 K/UL (ref 0.05–0.5)
EOSINOPHILS RELATIVE PERCENT: 0 % (ref 0–6)
EOSINOPHILS RELATIVE PERCENT: 0 % (ref 0–6)
ERYTHROCYTE [DISTWIDTH] IN BLOOD BY AUTOMATED COUNT: 13.5 % (ref 11.5–15)
ERYTHROCYTE [DISTWIDTH] IN BLOOD BY AUTOMATED COUNT: 13.7 % (ref 11.5–15)
GFR, ESTIMATED: 51 ML/MIN/1.73M2
GFR, ESTIMATED: 65 ML/MIN/1.73M2
GLUCOSE BLD-MCNC: 146 MG/DL (ref 74–99)
GLUCOSE BLD-MCNC: 235 MG/DL (ref 74–99)
GLUCOSE BLD-MCNC: 240 MG/DL (ref 74–99)
GLUCOSE SERPL-MCNC: 171 MG/DL (ref 74–99)
GLUCOSE SERPL-MCNC: 268 MG/DL (ref 74–99)
HBA1C MFR BLD: 6 % (ref 4–5.6)
HCT VFR BLD AUTO: 39.8 % (ref 34–48)
HCT VFR BLD AUTO: 46.6 % (ref 34–48)
HGB BLD-MCNC: 13.1 G/DL (ref 11.5–15.5)
HGB BLD-MCNC: 15.2 G/DL (ref 11.5–15.5)
LACTATE BLDV-SCNC: 1 MMOL/L (ref 0.5–2.2)
LACTATE BLDV-SCNC: 6.1 MMOL/L (ref 0.5–2.2)
LYMPHOCYTES NFR BLD: 0.25 K/UL (ref 1.5–4)
LYMPHOCYTES NFR BLD: 0.37 K/UL (ref 1.5–4)
LYMPHOCYTES RELATIVE PERCENT: 2 % (ref 20–42)
LYMPHOCYTES RELATIVE PERCENT: 6 % (ref 20–42)
MAGNESIUM SERPL-MCNC: 2 MG/DL (ref 1.6–2.6)
MAGNESIUM SERPL-MCNC: 2 MG/DL (ref 1.6–2.6)
MCH RBC QN AUTO: 28.9 PG (ref 26–35)
MCH RBC QN AUTO: 29.2 PG (ref 26–35)
MCHC RBC AUTO-ENTMCNC: 32.6 G/DL (ref 32–34.5)
MCHC RBC AUTO-ENTMCNC: 32.9 G/DL (ref 32–34.5)
MCV RBC AUTO: 88.6 FL (ref 80–99.9)
MCV RBC AUTO: 88.8 FL (ref 80–99.9)
MONOCYTES NFR BLD: 0.05 K/UL (ref 0.1–0.95)
MONOCYTES NFR BLD: 0.38 K/UL (ref 0.1–0.95)
MONOCYTES NFR BLD: 1 % (ref 2–12)
MONOCYTES NFR BLD: 3 % (ref 2–12)
MYELOCYTES ABSOLUTE COUNT: 0.05 K/UL
MYELOCYTES: 1 %
NEUTROPHILS NFR BLD: 92 % (ref 43–80)
NEUTROPHILS NFR BLD: 95 % (ref 43–80)
NEUTS SEG NFR BLD: 12.07 K/UL (ref 1.8–7.3)
NEUTS SEG NFR BLD: 5.82 K/UL (ref 1.8–7.3)
PATH INTERP FLD-IMP: ABNORMAL
PHOSPHATE SERPL-MCNC: 2.3 MG/DL (ref 2.5–4.5)
PLATELET # BLD AUTO: 220 K/UL (ref 130–450)
PLATELET # BLD AUTO: 250 K/UL (ref 130–450)
PMV BLD AUTO: 9.8 FL (ref 7–12)
PMV BLD AUTO: 9.8 FL (ref 7–12)
POTASSIUM SERPL-SCNC: 3.7 MMOL/L (ref 3.5–5)
POTASSIUM SERPL-SCNC: 4 MMOL/L (ref 3.5–5)
PROCALCITONIN SERPL-MCNC: 7.37 NG/ML (ref 0–0.08)
PROT SERPL-MCNC: 6.4 G/DL (ref 6.4–8.3)
PROT SERPL-MCNC: 7.7 G/DL (ref 6.4–8.3)
RBC # BLD AUTO: 4.48 M/UL (ref 3.5–5.5)
RBC # BLD AUTO: 5.26 M/UL (ref 3.5–5.5)
RBC # BLD: ABNORMAL 10*6/UL
RBC # BLD: ABNORMAL 10*6/UL
SODIUM SERPL-SCNC: 140 MMOL/L (ref 132–146)
SODIUM SERPL-SCNC: 140 MMOL/L (ref 132–146)
SPECIMEN TYPE: ABNORMAL
T4 FREE SERPL-MCNC: 0.9 NG/DL (ref 0.9–1.7)
TSH SERPL DL<=0.05 MIU/L-ACNC: 0.49 UIU/ML (ref 0.27–4.2)
WBC OTHER # BLD: 12.7 K/UL (ref 4.5–11.5)
WBC OTHER # BLD: 6.3 K/UL (ref 4.5–11.5)

## 2025-04-17 PROCEDURE — 6370000000 HC RX 637 (ALT 250 FOR IP)

## 2025-04-17 PROCEDURE — 80053 COMPREHEN METABOLIC PANEL: CPT

## 2025-04-17 PROCEDURE — 85025 COMPLETE CBC W/AUTO DIFF WBC: CPT

## 2025-04-17 PROCEDURE — 84145 PROCALCITONIN (PCT): CPT

## 2025-04-17 PROCEDURE — 2580000003 HC RX 258: Performed by: INTERNAL MEDICINE

## 2025-04-17 PROCEDURE — 83735 ASSAY OF MAGNESIUM: CPT

## 2025-04-17 PROCEDURE — 83605 ASSAY OF LACTIC ACID: CPT

## 2025-04-17 PROCEDURE — 83036 HEMOGLOBIN GLYCOSYLATED A1C: CPT

## 2025-04-17 PROCEDURE — 2500000003 HC RX 250 WO HCPCS: Performed by: INTERNAL MEDICINE

## 2025-04-17 PROCEDURE — 84443 ASSAY THYROID STIM HORMONE: CPT

## 2025-04-17 PROCEDURE — 1200000000 HC SEMI PRIVATE

## 2025-04-17 PROCEDURE — 36415 COLL VENOUS BLD VENIPUNCTURE: CPT

## 2025-04-17 PROCEDURE — 84100 ASSAY OF PHOSPHORUS: CPT

## 2025-04-17 PROCEDURE — 82962 GLUCOSE BLOOD TEST: CPT

## 2025-04-17 PROCEDURE — 87899 AGENT NOS ASSAY W/OPTIC: CPT

## 2025-04-17 PROCEDURE — 6360000002 HC RX W HCPCS: Performed by: INTERNAL MEDICINE

## 2025-04-17 PROCEDURE — 93005 ELECTROCARDIOGRAM TRACING: CPT | Performed by: INTERNAL MEDICINE

## 2025-04-17 PROCEDURE — 87449 NOS EACH ORGANISM AG IA: CPT

## 2025-04-17 PROCEDURE — 99222 1ST HOSP IP/OBS MODERATE 55: CPT | Performed by: ORTHOPAEDIC SURGERY

## 2025-04-17 PROCEDURE — 2700000000 HC OXYGEN THERAPY PER DAY

## 2025-04-17 PROCEDURE — 6370000000 HC RX 637 (ALT 250 FOR IP): Performed by: INTERNAL MEDICINE

## 2025-04-17 PROCEDURE — 71045 X-RAY EXAM CHEST 1 VIEW: CPT

## 2025-04-17 PROCEDURE — 84439 ASSAY OF FREE THYROXINE: CPT

## 2025-04-17 RX ORDER — DOXYCYCLINE 100 MG/1
100 CAPSULE ORAL EVERY 12 HOURS SCHEDULED
Status: DISCONTINUED | OUTPATIENT
Start: 2025-04-17 | End: 2025-04-19 | Stop reason: HOSPADM

## 2025-04-17 RX ORDER — 0.9 % SODIUM CHLORIDE 0.9 %
500 INTRAVENOUS SOLUTION INTRAVENOUS ONCE
Status: COMPLETED | OUTPATIENT
Start: 2025-04-17 | End: 2025-04-17

## 2025-04-17 RX ORDER — DIPHENHYDRAMINE HCL 25 MG
25 TABLET ORAL
Status: COMPLETED | OUTPATIENT
Start: 2025-04-17 | End: 2025-04-17

## 2025-04-17 RX ORDER — SODIUM CHLORIDE 9 MG/ML
INJECTION, SOLUTION INTRAVENOUS CONTINUOUS
Status: DISCONTINUED | OUTPATIENT
Start: 2025-04-17 | End: 2025-04-19 | Stop reason: HOSPADM

## 2025-04-17 RX ORDER — DIPHENHYDRAMINE HYDROCHLORIDE 50 MG/ML
25 INJECTION, SOLUTION INTRAMUSCULAR; INTRAVENOUS ONCE
Status: COMPLETED | OUTPATIENT
Start: 2025-04-17 | End: 2025-04-17

## 2025-04-17 RX ORDER — OXYCODONE AND ACETAMINOPHEN 5; 325 MG/1; MG/1
1 TABLET ORAL EVERY 8 HOURS PRN
Refills: 0 | Status: DISCONTINUED | OUTPATIENT
Start: 2025-04-17 | End: 2025-04-19 | Stop reason: HOSPADM

## 2025-04-17 RX ORDER — PROCHLORPERAZINE EDISYLATE 5 MG/ML
5 INJECTION INTRAMUSCULAR; INTRAVENOUS EVERY 6 HOURS PRN
Status: DISCONTINUED | OUTPATIENT
Start: 2025-04-17 | End: 2025-04-19 | Stop reason: HOSPADM

## 2025-04-17 RX ADMIN — DOXYCYCLINE HYCLATE 100 MG: 100 CAPSULE ORAL at 20:15

## 2025-04-17 RX ADMIN — LATANOPROST 1 DROP: 50 SOLUTION OPHTHALMIC at 20:50

## 2025-04-17 RX ADMIN — GABAPENTIN 300 MG: 300 CAPSULE ORAL at 15:06

## 2025-04-17 RX ADMIN — SODIUM CHLORIDE 500 ML: 0.9 INJECTION, SOLUTION INTRAVENOUS at 01:30

## 2025-04-17 RX ADMIN — Medication 10 ML: at 20:14

## 2025-04-17 RX ADMIN — WATER 1000 MG: 1 INJECTION INTRAMUSCULAR; INTRAVENOUS; SUBCUTANEOUS at 20:16

## 2025-04-17 RX ADMIN — PROCHLORPERAZINE EDISYLATE 5 MG: 5 INJECTION INTRAMUSCULAR; INTRAVENOUS at 00:21

## 2025-04-17 RX ADMIN — DIBASIC SODIUM PHOSPHATE, MONOBASIC POTASSIUM PHOSPHATE AND MONOBASIC SODIUM PHOSPHATE 1 TABLET: 852; 155; 130 TABLET ORAL at 20:15

## 2025-04-17 RX ADMIN — DIPHENHYDRAMINE HYDROCHLORIDE 25 MG: 50 INJECTION INTRAMUSCULAR; INTRAVENOUS at 01:25

## 2025-04-17 RX ADMIN — DIBASIC SODIUM PHOSPHATE, MONOBASIC POTASSIUM PHOSPHATE AND MONOBASIC SODIUM PHOSPHATE 1 TABLET: 852; 155; 130 TABLET ORAL at 10:18

## 2025-04-17 RX ADMIN — DOXYCYCLINE 100 MG: 100 INJECTION, POWDER, LYOPHILIZED, FOR SOLUTION INTRAVENOUS at 03:24

## 2025-04-17 RX ADMIN — Medication 10 ML: at 09:04

## 2025-04-17 RX ADMIN — SODIUM CHLORIDE 500 ML: 0.9 INJECTION, SOLUTION INTRAVENOUS at 02:28

## 2025-04-17 RX ADMIN — DOXYCYCLINE HYCLATE 100 MG: 100 CAPSULE ORAL at 09:03

## 2025-04-17 RX ADMIN — GABAPENTIN 300 MG: 300 CAPSULE ORAL at 05:53

## 2025-04-17 RX ADMIN — GABAPENTIN 300 MG: 300 CAPSULE ORAL at 23:22

## 2025-04-17 RX ADMIN — ENOXAPARIN SODIUM 40 MG: 100 INJECTION SUBCUTANEOUS at 09:03

## 2025-04-17 RX ADMIN — DIPHENHYDRAMINE HCL 25 MG: 25 TABLET ORAL at 00:22

## 2025-04-17 RX ADMIN — SODIUM CHLORIDE: 0.9 INJECTION, SOLUTION INTRAVENOUS at 20:12

## 2025-04-17 RX ADMIN — SODIUM CHLORIDE: 0.9 INJECTION, SOLUTION INTRAVENOUS at 01:47

## 2025-04-17 NOTE — CONSULTS
CONSULTATION NOTE :ID     Patient - Deborah Iglesias,  Age - 84 y.o.    - 1940      Room Number - 0430/0430-02   N -  [id number]   [facility] # - [id number]  Date of Admission -  2025  2:41 PM  Patient's PCP: Nevin Siegel APRN - CNP     Requesting Physician: Terence Odmo DO    HISTORY OF PRESENT ILLNESS   This is a 84 y.o. female who was admitted on 2025   to the hospital with a chief complaints of   Chief Complaint   Patient presents with    Joint Swelling     Patient sent in from  for right wrist joint pain for evaluation of septic joint, patient is not currently on antibiotics     ID was consulted on 25 for antibiotic management   UNKnonw to ID    present   Pt came in with left wrist acute pain   No f/c/n/v/d  Has a car   Was ATBx pTA URI   Cr1 wbc8.5->12.7   Esr49   Wrist joint calcium pyrophosphate dihydrate (CPPD)    S/p aspiration left wrist   It feels better has ROM    Current antibiotics   cefTRIAXone (ROCEPHIN) 1,000 mg in sterile water 10 mL IV syringe, Q24H  doxycycline hyclate (VIBRAMYCIN) capsule 100 mg, 2 times per day           PAST MEDICAL AND SURGICAL HISTORY     Past Medical History:   Diagnosis Date    Acid reflux disease     Anticoagulant long-term use     Anxiety     Arthritis     Central retinal vein occlusion of right eye (HCC)     with vision deficets    Glaucoma, left eye     History of fracture of femur 2006    right; no surgery    History of trigeminal neuralgia     Hyperlipidemia     Hypertension     Hypoglycemia     Irritable bowel syndrome 2018    Macular degeneration, right eye 2023    Osteoarthritis     Right knee pain 2020    for TJAK 2020 Dr KEMI Ramirez    Sleep apnea     Symptoms consistent with irritable bowel syndrome     Use of cane as ambulatory aid     pain    Wears glasses        Past Surgical History:   Procedure Laterality Date    BREAST BIOPSY Bilateral  right ,  left    BREAST

## 2025-04-17 NOTE — PROGRESS NOTES
4 Eyes Skin Assessment     NAME:  Deborah Iglesias  YOB: 1940  MEDICAL RECORD NUMBER:  [id number]    The patient is being assessed for  Admission    I agree that at least one RN has performed a thorough Head to Toe Skin Assessment on the patient. ALL assessment sites listed below have been assessed.      Areas assessed by both nurses:    Head, Face, Ears, Shoulders, Back, Chest, Arms, Elbows, Hands, Sacrum. Buttock, Coccyx, Ischium, and Legs. Feet and Heels        Does the Patient have a Wound? No noted wound(s)       Deniz Prevention initiated by RN: Yes  Wound Care Orders initiated by RN: No    Pressure Injury (Stage 3,4, Unstageable, DTI, NWPT, and Complex wounds) if present, place Wound referral order by RN under : No    New Ostomies, if present place, Ostomy referral order under : No     Nurse 1 eSignature: Electronically signed by Chelsea Romeo RN on 4/16/25 at 11:08 PM EDT    **SHARE this note so that the co-signing nurse can place an eSignature**    Nurse 2 eSignature: Electronically signed by Paige Ochoa RN on 4/17/25 at 12:47 AM EDT

## 2025-04-17 NOTE — PROGRESS NOTES
Approximately 10 min after I started the vancomycin infusion, patient had sudden severe onset of nausea and lightheaded and pale. I immediately stopped infusion.   I called  to notify him of the event. Nausea medication and a dose of benadryl ordered.   Patients symptoms resolved approx. 10 min after infusion stopped prior to receiving nausea med and benadryl.   Vital signs are stable. Patient states her nausea and lightheadedness resolved.   Patient on telemetry monitor.

## 2025-04-17 NOTE — CONSULTS
Consult Note            Date:4/17/2025        Patient Name:Deborah Iglesias     YOB: 1940     Age:84 y.o.    Consults    Chief Complaint     Chief Complaint   Patient presents with    Joint Swelling     Patient sent in from  for right wrist joint pain for evaluation of septic joint, patient is not currently on antibiotics      ”Left wrist pain”    History Obtained From   patient    History of Present Illness   Patient had a couple days where she has spontaneous onset of pain and swelling through her wrist.  She has had some arthritic complaints in the past.  She has not been on antibiotics or felt sick but is just became more painful to where she could not even move her fingers secondary to the pain.  She has not had a trauma or an accident and has not had any other illnesses recent    Past Medical History     Past Medical History:   Diagnosis Date    Acid reflux disease     Anticoagulant long-term use 2020    Anxiety     Arthritis     Central retinal vein occlusion of right eye (HCC)     with vision deficets    Glaucoma, left eye     History of fracture of femur 2006    right; no surgery    History of trigeminal neuralgia     Hyperlipidemia     Hypertension     Hypoglycemia     Irritable bowel syndrome 2018    Macular degeneration, right eye 05/18/2023    Osteoarthritis 2012    Right knee pain 07/2020    for TJAK 07/08/2020 Dr KEMI Ramirez    Sleep apnea 20212    Symptoms consistent with irritable bowel syndrome     Use of cane as ambulatory aid     pain    Wears glasses         Past Surgical History     Past Surgical History:   Procedure Laterality Date    BREAST BIOPSY Bilateral 2015 right , 2020 left    BREAST LUMPECTOMY      CATARACT REMOVAL WITH IMPLANT Right 2005    CHOLECYSTECTOMY  2015    JOINT REPLACEMENT      KNEE ARTHROPLASTY  2012, 2020    OTHER SURGICAL HISTORY      arthroscopic thumb bilateral    REVISION TOTAL KNEE ARTHROPLASTY Right 07/08/2020    RIGHT KNEE TOTAL ARTHROPLASTY REVISION   ++LUCY++   ++PNB++ performed by Florin Ramirez MD at [facility] OR    TONSILLECTOMY      TOTAL KNEE ARTHROPLASTY Bilateral 2013    US BREAST BIOPSY W LOC DEVICE 1ST LESION LEFT Left 08/22/2022    US BREAST NEEDLE BIOPSY LEFT 8/22/2022 [facility]        Medications     Prior to Admission medications    Medication Sig Start Date End Date Taking? Authorizing Provider   predniSONE (DELTASONE) 10 MG tablet Take 4 tablets by mouth daily for 5 days 4/17/25 4/22/25 Yes Bryanna Skaggs PA   diclofenac sodium (VOLTAREN) 1 % GEL Apply 4 g topically 4 times daily for 5 days 4/16/25 4/21/25 Yes Bryanna Skaggs PA   gabapentin (NEURONTIN) 300 MG capsule Take 1 capsule by mouth 3 times daily as needed (Pain).   Yes Provider, MD Theresa   omeprazole (PRILOSEC) 20 MG delayed release capsule TAKE 1 CAPSULE BY MOUTH EVERY MORNING BEFORE BREAKFAST 4/14/25  Yes Nevin Siegel APRN - CNP   lidocaine (LIDODERM) 5 % Place 1 patch onto the skin daily 12 hours on, 12 hours off.  Patient taking differently: Place 1 patch onto the skin daily as needed for Pain 12 hours on, 12 hours off. 3/25/25 7/23/25 Yes Nevin Siegel APRN - CNP   azelastine (ASTELIN) 0.1 % nasal spray 2 sprays by Nasal route 2 times daily Use in each nostril as directed 2/11/25  Yes Nevin Siegel APRN - CNP   lovastatin (MEVACOR) 40 MG tablet TAKE 1 TABLET BY MOUTH EVERY NIGHT 2/3/25  Yes Nevin Siegel APRN - CNP   losartan (COZAAR) 50 MG tablet TAKE 1 TABLET BY MOUTH DAILY 1/3/25  Yes Nevin Siegel APRN - CNP   fluticasone (FLONASE) 50 MCG/ACT nasal spray ADMINISTER 2 SPRAYS BY EACH NOSTRIL ROUTE DAILY. SHAKE LIQUID 11/11/24  Yes Nevin Siegel APRN - CNP   folic acid (FOLVITE) 1 MG tablet Take 1 tablet by mouth daily 10/25/24  Yes Nevin Siegel APRN - CNP   vitamin D (CHOLECALCIFEROL) 25 MCG (1000 UT) TABS tablet Take 1 tablet by mouth daily 10/25/24  Yes Nevin Siegel, APRN - CNP   Cyanocobalamin (VITAMIN B-12) 1000 MCG SUBL Place

## 2025-04-17 NOTE — CARE COORDINATION
Case Management Assessment  Initial Evaluation    Date/Time of Evaluation: 4/17/2025 1:59 PM  Assessment Completed by: Darlene Ritter RN    If patient is discharged prior to next notation, then this note serves as note for discharge by case management.    Patient Name: Deborah Iglesias                   YOB: 1940  Diagnosis: Left wrist pain [M25.532]  Osteoarthritis of left wrist, unspecified osteoarthritis type [M19.032]  Cellulitis of left wrist [L03.114]                   Date / Time: 4/16/2025  2:41 PM    Patient Admission Status: Inpatient   Readmission Risk (Low < 19, Mod (19-27), High > 27): Readmission Risk Score: 10.5    Current PCP: Nevin Siegel APRN - CNP  PCP verified by CM? Yes    Chart Reviewed: Yes      History Provided by: Patient  Patient Orientation: Alert and Oriented, Person, Place, Situation    Patient Cognition: Alert    Hospitalization in the last 30 days (Readmission):  No        Advance Directives:      Code Status: Full Code   Patient's Primary Decision Maker is: Legal Next of Kin    Primary Decision Maker: Agustín Iglesias - Spouse - [phone]    Discharge Planning:    Patient lives with: Spouse/Significant Other Type of Home: House  Primary Care Giver: Self  Patient Support Systems include: Spouse/Significant Other, Family Members, Friends/Neighbors   Current Financial resources:    Current community resources:    Current services prior to admission: None            Current DME:              Type of Home Care services:  None    ADLS  Prior functional level: Independent in ADLs/IADLs  Current functional level: Independent in ADLs/IADLs    PT AM-PAC:   /24  OT AM-PAC:   /24    Family can provide assistance at DC: Yes  Would you like Case Management to discuss the discharge plan with any other family members/significant others, and if so, who? No  Plans to Return to Present Housing: Yes    Potential Assistance needed at discharge: N/A            Potential DME:    Patient

## 2025-04-17 NOTE — H&P
Department of Internal Medicine  History and Physical    PCP: Nevin Siegel APRN - CNP  Admitting Physician: Dr. Odom/Romulo  Consultants:   Date of Service: 4/16/2025    CHIEF COMPLAINT:  left wrist pain    HISTORY OF PRESENT ILLNESS:    Patient is 84-year-old female presented to the ED due to left wrist pain since Monday.  Has been worsening since time.  She admits to associated edema.  She admits to associated fever/chills.  She denies any shortness of breath or chest pain.  She denies any injury to her left wrist.  She denies any previous history of joint infection.    PAST MEDICAL Hx:  Past Medical History:   Diagnosis Date    Acid reflux disease     Anticoagulant long-term use 2020    Anxiety     Arthritis     Central retinal vein occlusion of right eye (HCC)     with vision deficets    Glaucoma, left eye     History of fracture of femur 2006    right; no surgery    History of trigeminal neuralgia     Hyperlipidemia     Hypertension     Hypoglycemia     Irritable bowel syndrome 2018    Macular degeneration, right eye 05/18/2023    Osteoarthritis 2012    Right knee pain 07/2020    for TJAK 07/08/2020 Dr KEMI Ramirez    Sleep apnea 20212    Symptoms consistent with irritable bowel syndrome     Use of cane as ambulatory aid     pain    Wears glasses        PAST SURGICAL Hx:   Past Surgical History:   Procedure Laterality Date    BREAST BIOPSY Bilateral 2015 right , 2020 left    BREAST LUMPECTOMY      CATARACT REMOVAL WITH IMPLANT Right 2005    CHOLECYSTECTOMY  2015    JOINT REPLACEMENT      KNEE ARTHROPLASTY  2012, 2020    OTHER SURGICAL HISTORY      arthroscopic thumb bilateral    REVISION TOTAL KNEE ARTHROPLASTY Right 07/08/2020    RIGHT KNEE TOTAL ARTHROPLASTY REVISION  ++LUCY++   ++PNB++ performed by Florin Ramirez MD at [facility] OR    TONSILLECTOMY      TOTAL KNEE ARTHROPLASTY Bilateral 2013    US BREAST BIOPSY W LOC DEVICE 1ST LESION LEFT Left 08/22/2022    US BREAST NEEDLE BIOPSY LEFT 8/22/2022 [facility]

## 2025-04-17 NOTE — PROGRESS NOTES
Internal Medicine Consult Note    [facility]    Terence Odom D.O., RUDI Sebastian D.O., RUDI Diamond D.O.     Carolina Wang, MSN, APRN, NP-C  Vladislav Crook, MSN, APRN-CNP  Florin Good, MSN, APRN-CNP  Nevin Siegel, MSN APRN-CNP  Magdalene Arcos, MSN. APRN-NP-C     Primary Care Physician: Nevin Siegel, APRN - CNP   Admitting Physician:  Terence Odom DO  Admission date and time: 4/16/2025  2:41 PM    Room:  [address] [place] [address]  Admitting diagnosis: Left wrist pain [M25.532]  Osteoarthritis of left wrist, unspecified osteoarthritis type [M19.032]  Cellulitis of left wrist [L03.114]    Patient Name: Deborah Iglesias  MRN: [id number]    Date of Service: 4/17/2025     Subjective:  Deborah is a 84 y.o. female who was seen and examined today,4/17/2025, at the bedside.  Patient complaining of pain and discomfort in left wrist.  Some swelling noted patient has no complaints of associated trauma.  Denies chest pain or shortness of breath.  Does complain of some constipation.  Patient possibly had reaction to vancomycin last evening    No family present during my examination.    Review of System:   Constitutional:   Denies fever or chills, weight loss or gain, fatigue or malaise.  HEENT:   Denies ear pain, sore throat, sinus or eye problems.  Cardiovascular:   Denies any chest pain, irregular heartbeats, or palpitations.   Respiratory:   Denies shortness of breath, coughing, sputum production, hemoptysis, or wheezing.  Gastrointestinal:   Complains of constipation.  Genitourinary:    Denies any urgency, frequency, hematuria. Voiding  without difficulty.  Extremities:   Denies lower extremity swelling, edema or cyanosis.  Swelling of the left wrist and hand  Neurology:    Denies any headache or focal neurological deficits, Denies generalized weakness or memory difficulty.   Psch:   Denies being anxious or

## 2025-04-17 NOTE — CODE DOCUMENTATION
RRT for possible reaction to vancomycin.  Pt became nauseated and vomited multiple times, pale, diaphoretic, and shivering.  Vancomycin stopped, attending contacted and new orders given.  Pt improved, then shortly after had same symptoms and RRT was called.  Antibiotics changed to ceftriaxone and doxycycline, 500cc NS bolus and 25mg IV benadryl given.  CBC, CMP, and lactic acid labs sent.  After bolus, 100cc/hr NS infusion.  Pt VS improving.

## 2025-04-17 NOTE — PROGRESS NOTES
made aware of RRT and current patient condition, lab results, and interventions. Additional orders placed. no orders for  transfer to higher level of care at this, just monitor closely.   Will continue to closely monitor patient vitals and clinical condition and results. Patient on telemetry monitor, vitals stable, patient sleeping but wakes easily and is alert and oriented.

## 2025-04-17 NOTE — ACP (ADVANCE CARE PLANNING)
Advance Care Planning   Healthcare Decision Maker:    Primary Decision Maker: Agustín Iglesias - [facility] - [phone]

## 2025-04-17 NOTE — SIGNIFICANT EVENT
RRT was called for possible vancomycin reaction and nausea, vomiting, tachycardia.  IV Benadryl given.  Seen and examined at bedside.  Vitals stable except tachycardia. Alert and wakes. Have some chills and shivering, T 99.  IV vanco switched by ceftriaxone + doxy - for septic arthritis  IV fluid, repeat vitals after fluid  Repeat lactate, CBC, CMP.  Will monitor vitals.  RRT team will continue to monitor.

## 2025-04-18 LAB
ALBUMIN SERPL-MCNC: 3.4 G/DL (ref 3.5–5.2)
ALP SERPL-CCNC: 81 U/L (ref 35–104)
ALT SERPL-CCNC: 15 U/L (ref 0–32)
ANION GAP SERPL CALCULATED.3IONS-SCNC: 13 MMOL/L (ref 7–16)
ASO AB SERPL-ACNC: <20 IU/ML (ref 0–200)
AST SERPL-CCNC: 30 U/L (ref 0–31)
BASOPHILS # BLD: 0.04 K/UL (ref 0–0.2)
BASOPHILS NFR BLD: 0 % (ref 0–2)
BILIRUB SERPL-MCNC: 0.2 MG/DL (ref 0–1.2)
BUN SERPL-MCNC: 12 MG/DL (ref 6–23)
CALCIUM SERPL-MCNC: 9 MG/DL (ref 8.6–10.2)
CHLORIDE SERPL-SCNC: 108 MMOL/L (ref 98–107)
CO2 SERPL-SCNC: 20 MMOL/L (ref 22–29)
CREAT SERPL-MCNC: 0.9 MG/DL (ref 0.5–1)
EKG ATRIAL RATE: 110 BPM
EKG P AXIS: 47 DEGREES
EKG P-R INTERVAL: 168 MS
EKG Q-T INTERVAL: 340 MS
EKG QRS DURATION: 80 MS
EKG QTC CALCULATION (BAZETT): 460 MS
EKG R AXIS: -33 DEGREES
EKG T AXIS: 68 DEGREES
EKG VENTRICULAR RATE: 110 BPM
EOSINOPHIL # BLD: 0.07 K/UL (ref 0.05–0.5)
EOSINOPHILS RELATIVE PERCENT: 1 % (ref 0–6)
ERYTHROCYTE [DISTWIDTH] IN BLOOD BY AUTOMATED COUNT: 14 % (ref 11.5–15)
GFR, ESTIMATED: 67 ML/MIN/1.73M2
GLUCOSE SERPL-MCNC: 109 MG/DL (ref 74–99)
HCT VFR BLD AUTO: 38.8 % (ref 34–48)
HGB BLD-MCNC: 12.8 G/DL (ref 11.5–15.5)
IGG SERPL-MCNC: 763 MG/DL (ref 700–1600)
IMM GRANULOCYTES # BLD AUTO: 0.07 K/UL (ref 0–0.58)
IMM GRANULOCYTES NFR BLD: 1 % (ref 0–5)
L PNEUMO1 AG UR QL IA.RAPID: NEGATIVE
LACTATE BLDV-SCNC: 0.9 MMOL/L (ref 0.5–2.2)
LYMPHOCYTES NFR BLD: 1.93 K/UL (ref 1.5–4)
LYMPHOCYTES RELATIVE PERCENT: 22 % (ref 20–42)
MCH RBC QN AUTO: 29.2 PG (ref 26–35)
MCHC RBC AUTO-ENTMCNC: 33 G/DL (ref 32–34.5)
MCV RBC AUTO: 88.6 FL (ref 80–99.9)
MONOCYTES NFR BLD: 0.68 K/UL (ref 0.1–0.95)
MONOCYTES NFR BLD: 8 % (ref 2–12)
NEUTROPHILS NFR BLD: 69 % (ref 43–80)
NEUTS SEG NFR BLD: 6.15 K/UL (ref 1.8–7.3)
PLATELET # BLD AUTO: 210 K/UL (ref 130–450)
PMV BLD AUTO: 10.6 FL (ref 7–12)
POTASSIUM SERPL-SCNC: 3.8 MMOL/L (ref 3.5–5)
PROT SERPL-MCNC: 6.4 G/DL (ref 6.4–8.3)
RBC # BLD AUTO: 4.38 M/UL (ref 3.5–5.5)
S PNEUM AG SPEC QL: NEGATIVE
SODIUM SERPL-SCNC: 141 MMOL/L (ref 132–146)
SPECIMEN SOURCE: NORMAL
WBC OTHER # BLD: 8.9 K/UL (ref 4.5–11.5)

## 2025-04-18 PROCEDURE — 2500000003 HC RX 250 WO HCPCS: Performed by: INTERNAL MEDICINE

## 2025-04-18 PROCEDURE — 97530 THERAPEUTIC ACTIVITIES: CPT | Performed by: PHYSICAL THERAPIST

## 2025-04-18 PROCEDURE — 97165 OT EVAL LOW COMPLEX 30 MIN: CPT

## 2025-04-18 PROCEDURE — 6370000000 HC RX 637 (ALT 250 FOR IP): Performed by: INTERNAL MEDICINE

## 2025-04-18 PROCEDURE — 1200000000 HC SEMI PRIVATE

## 2025-04-18 PROCEDURE — 6360000002 HC RX W HCPCS: Performed by: INTERNAL MEDICINE

## 2025-04-18 PROCEDURE — 85025 COMPLETE CBC W/AUTO DIFF WBC: CPT

## 2025-04-18 PROCEDURE — 6370000000 HC RX 637 (ALT 250 FOR IP)

## 2025-04-18 PROCEDURE — 97535 SELF CARE MNGMENT TRAINING: CPT

## 2025-04-18 PROCEDURE — 86063 ANTISTREPTOLYSIN O SCREEN: CPT

## 2025-04-18 PROCEDURE — 80053 COMPREHEN METABOLIC PANEL: CPT

## 2025-04-18 PROCEDURE — 36415 COLL VENOUS BLD VENIPUNCTURE: CPT

## 2025-04-18 PROCEDURE — 97161 PT EVAL LOW COMPLEX 20 MIN: CPT | Performed by: PHYSICAL THERAPIST

## 2025-04-18 PROCEDURE — 82784 ASSAY IGA/IGD/IGG/IGM EACH: CPT

## 2025-04-18 PROCEDURE — 83605 ASSAY OF LACTIC ACID: CPT

## 2025-04-18 PROCEDURE — 86611 BARTONELLA ANTIBODY: CPT

## 2025-04-18 RX ORDER — DOXYCYCLINE 100 MG/1
100 CAPSULE ORAL EVERY 12 HOURS SCHEDULED
Qty: 20 CAPSULE | Refills: 0 | Status: SHIPPED | OUTPATIENT
Start: 2025-04-18 | End: 2025-04-28

## 2025-04-18 RX ADMIN — DOXYCYCLINE HYCLATE 100 MG: 100 CAPSULE ORAL at 20:33

## 2025-04-18 RX ADMIN — LATANOPROST 1 DROP: 50 SOLUTION OPHTHALMIC at 21:56

## 2025-04-18 RX ADMIN — GABAPENTIN 300 MG: 300 CAPSULE ORAL at 21:56

## 2025-04-18 RX ADMIN — Medication 10 ML: at 20:34

## 2025-04-18 RX ADMIN — ENOXAPARIN SODIUM 40 MG: 100 INJECTION SUBCUTANEOUS at 09:01

## 2025-04-18 RX ADMIN — DOXYCYCLINE HYCLATE 100 MG: 100 CAPSULE ORAL at 09:00

## 2025-04-18 RX ADMIN — GABAPENTIN 300 MG: 300 CAPSULE ORAL at 05:30

## 2025-04-18 RX ADMIN — GABAPENTIN 300 MG: 300 CAPSULE ORAL at 14:31

## 2025-04-18 NOTE — PROGRESS NOTES
Physical Therapy  Physical Therapy Initial Evaluation/Plan of Care    Room #:  0430/0430-02  Patient Name: Deborah Iglesias  YOB: 1940  MRN: [id number]    Date of Service: 4/18/2025     Tentative placement recommendation: Home with Home Health Physical Therapy  Equipment recommendation: Patient has needed equipment       Evaluating Physical Therapist: Prabhu Okeefe PT, DPT #[id number]      Specific Provider Orders/Date/Referring Provider :     04/17/25 1415    PT eval and treat  Start:  04/17/25 1415,   End:  04/17/25 1415,   ONE TIME,   Standing Count:  1 Occurrences,   R       Ilene, Terence MASON DO Acknowledge New    Admitting Diagnosis:   Left wrist pain [M25.532]  Osteoarthritis of left wrist, unspecified osteoarthritis type [M19.032]  Cellulitis of left wrist [L03.114]      Surgery: none  Visit Diagnoses         Codes      Left wrist pain     M25.532      Osteoarthritis of left wrist, unspecified osteoarthritis type     M19.032            Patient Active Problem List   Diagnosis    Multiple subsegmental pulmonary emboli without acute cor pulmonale (HCC)    Mixed hyperlipidemia    Reactive depression    Age-related nuclear cataract of left eye    Type 2 diabetes mellitus with hyperglycemia, without long-term current use of insulin (HCC)    Chronic pain of left knee    Vitamin B12 deficiency    Vitamin D deficiency    Pain of left hip    Primary hypertension    Cyclical neutropenia (HCC)    Cellulitis of left wrist    Pseudogout of left wrist        ASSESSMENT of Current Deficits Patient exhibits decreased strength, balance, and endurance impairing functional mobility, transfers, gait , gait distance, and tolerance to activity. Pt mildly unsteady with no LOB, dizziness, or SOB during session with O2 remaining 93% or higher.        PHYSICAL THERAPY  PLAN OF CARE       Physical therapy plan of care is established based on physician order,  patient diagnosis and clinical assessment    Current Treatment

## 2025-04-18 NOTE — PROGRESS NOTES
Internal Medicine Consult Note    [facility]    Terence Odom D.O., RUDI Sebastian D.O., RUDI Diamond D.O.     Carolina Wang, MSN, APRN, NP-C  Vladislav Crook, MSN, APRN-CNP  Florin Good, MSN, APRN-CNP  Nevin Siegel, MSN APRN-CNP  Magdalene Arcos, MSN. APRN-NP-C     Primary Care Physician: Nevin Siegel, APRN - CNP   Admitting Physician:  Terence Odom DO  Admission date and time: 4/16/2025  2:41 PM    Room:  [address] [place] [address]  Admitting diagnosis: Left wrist pain [M25.532]  Osteoarthritis of left wrist, unspecified osteoarthritis type [M19.032]  Cellulitis of left wrist [L03.114]    Patient Name: Deborah Iglesias  MRN: [id number]    Date of Service: 4/18/2025     Subjective:  Deborah is a 84 y.o. female who was seen and examined today,4/18/2025, at the bedside.  Left wrist seem clinically improving today.  Adjustment made by infectious disease.  Will observe for 24 hours with probable discharge tomorrow      No family present during my examination.    Review of System:   Constitutional:   Denies fever or chills, weight loss or gain, fatigue or malaise.  HEENT:   Denies ear pain, sore throat, sinus or eye problems.  Cardiovascular:   Denies any chest pain, irregular heartbeats, or palpitations.   Respiratory:   Denies shortness of breath, coughing, sputum production, hemoptysis, or wheezing.  Gastrointestinal:   Complains of constipation.  Genitourinary:    Denies any urgency, frequency, hematuria. Voiding  without difficulty.  Extremities:   Denies lower extremity swelling, edema or cyanosis.  Swelling of the left wrist and hand  Neurology:    Denies any headache or focal neurological deficits, Denies generalized weakness or memory difficulty.   Psch:   Denies being anxious or depressed.  Musculoskeletal:    Denies  myalgias, joint complaints or back pain.   Integumentary:   Denies any rashes, ulcers, or excoriations.

## 2025-04-18 NOTE — PLAN OF CARE
Problem: Chronic Conditions and Co-morbidities  Goal: Patient's chronic conditions and co-morbidity symptoms are monitored and maintained or improved  4/18/2025 1003 by Lucy Ness RN  Outcome: Progressing  4/18/2025 0024 by Ava Solis RN  Outcome: Progressing     Problem: Pain  Goal: Verbalizes/displays adequate comfort level or baseline comfort level  4/18/2025 1003 by Lucy Ness RN  Outcome: Progressing  4/18/2025 0024 by Ava Solis RN  Outcome: Progressing     Problem: Safety - Adult  Goal: Free from fall injury  4/18/2025 1003 by Lucy Ness RN  Outcome: Progressing  4/18/2025 0024 by Ava Solis RN  Outcome: Progressing

## 2025-04-18 NOTE — CARE COORDINATION
4-18-Cm note: ID med rec'd oral abx's(Doxy), pt plans on home with no needs. Her  will transport. Electronically signed by Elisa England RN on 4/18/2025 at 3:34 PM

## 2025-04-18 NOTE — PROGRESS NOTES
Occupational Therapy  OCCUPATIONAL THERAPY INITIAL EVALUATION    [facility]  [address], [place]      Date:2025                                                Patient Name: Deborah Iglesias  MRN: [id number]  : 1940  Room: [facility]0430-02    Evaluating OT: Harmeet Tinsley OTR/L #[id number]     Referring Provider: Terence Odom DO   Specific Provider Orders/Date: OT eval and treat 25    Diagnosis: Left wrist pain [M25.532]  Osteoarthritis of left wrist, unspecified osteoarthritis type [M19.032]  Cellulitis of left wrist [L03.114]   Pt admitted to hospital with wrist joint swelling. RRT for nausea, vomiting and tachycardia     Pertinent Medical History:  has a past medical history of Acid reflux disease, Anticoagulant long-term use, Anxiety, Arthritis, Central retinal vein occlusion of right eye (HCC), Glaucoma, left eye, History of fracture of femur, History of trigeminal neuralgia, Hyperlipidemia, Hypertension, Hypoglycemia, Irritable bowel syndrome, Macular degeneration, right eye, Osteoarthritis, Right knee pain, Sleep apnea, Symptoms consistent with irritable bowel syndrome, Use of cane as ambulatory aid, and Wears glasses.       Precautions:  Fall Risk,     Assessment of current deficits    [x] Functional mobility  [x]ADLs  [x] Strength               []Cognition    [x] Functional transfers   [x] IADLs         [x] Safety Awareness   [x]Endurance    [] Fine Coordination              [x] Balance      [] Vision/perception   []Sensation     []Gross Motor Coordination  [] ROM  [] Delirium                   [] Motor Control     OT PLAN OF CARE   OT POC based on physician orders, patient diagnosis and results of clinical assessment    Frequency/Duration 1-5 days/wk for 2 weeks PRN   Specific OT Treatment Interventions to include:   * Instruction/training on adapted ADL techniques and AE recommendations to increase functional independence within

## 2025-04-18 NOTE — PROGRESS NOTES
[facility]  [facility]  Progress Note  CC: left wrist pain  Face to face encounter   SUBJECTIVE:  4/18/2025  Patient is in bed- left wrist is feeling better  Has been afebrile. On room air. Worked with PT this morning.    at bedside.   Patient is tolerating medications. No reported adverse drug reactions.  No nausea, vomiting, diarrhea.    Medications:  Scheduled Meds:   cefTRIAXone (ROCEPHIN) IV  1,000 mg IntraVENous Q24H    doxycycline hyclate  100 mg Oral 2 times per day    sodium chloride flush  5-40 mL IntraVENous 2 times per day    enoxaparin  40 mg SubCUTAneous Daily    gabapentin  300 mg Oral Q8H    latanoprost  1 drop Both Eyes Nightly     Continuous Infusions:   sodium chloride 100 mL/hr at 04/17/25 2012    dextrose      sodium chloride       PRN Meds:prochlorperazine, oxyCODONE-acetaminophen, glucose, dextrose bolus **OR** dextrose bolus, glucagon (rDNA), dextrose, sodium chloride flush, sodium chloride, potassium chloride **OR** potassium alternative oral replacement **OR** potassium chloride, magnesium sulfate, polyethylene glycol, acetaminophen **OR** acetaminophen  OBJECTIVE:  Patient Vitals for the past 24 hrs:   BP Temp Temp src Pulse Resp SpO2   04/18/25 0900 137/85 97.3 °F (36.3 °C) Oral 83 -- 100 %   04/18/25 0400 -- -- -- 78 -- --   04/17/25 2030 133/77 97.9 °F (36.6 °C) Oral 76 18 96 %   04/17/25 1100 -- -- -- -- -- 95 %     Constitutional: The patient is awake, alert, and oriented. Sitting up in bed.   Skin: Warm and dry. No rashes were noted.   Head: Eyes show round, and reactive pupils. No jaundice.   Mouth: Moist mucous membranes, no ulcerations, no thrush.   Neck: Supple to movements. No lymphadenopathy.   Chest: No use of accessory muscles to breathe. Symmetrical expansion. Auscultation reveals no wheezing, crackles, or rhonchi.   Cardiovascular: S1 and S2 are rhythmic and regular.  Abdomen: Positive bowel sounds to auscultation. Benign to palpation.    Extremities: No clubbing, no cyanosis, no edema. Left wrist with some edema. Little to no erythema- better range of motion  PIV    Laboratory and Tests Review:  Lab Results   Component Value Date    WBC 8.9 04/18/2025    WBC 12.7 (H) 04/17/2025    WBC 6.3 04/17/2025    HGB 12.8 04/18/2025    HCT 38.8 04/18/2025    MCV 88.6 04/18/2025     04/18/2025     Lab Results   Component Value Date    NEUTROABS 6.15 04/18/2025    NEUTROABS 12.07 (H) 04/17/2025    NEUTROABS 5.82 04/17/2025     Lab Results   Component Value Date    CRP 46.4 (H) 04/16/2025    CRP 11.0 (H) 02/07/2025     Lab Results   Component Value Date    SEDRATE 49 (H) 04/16/2025    SEDRATE 83 (H) 02/07/2025     Lab Results   Component Value Date    ALT 15 04/18/2025    AST 30 04/18/2025    ALKPHOS 81 04/18/2025    BILITOT 0.2 04/18/2025     Lab Results   Component Value Date/Time     04/18/2025 06:01 AM    K 3.8 04/18/2025 06:01 AM     04/18/2025 06:01 AM    CO2 20 04/18/2025 06:01 AM    BUN 12 04/18/2025 06:01 AM    CREATININE 0.9 04/18/2025 06:01 AM    GFRAA >60 08/04/2022 09:26 AM    LABGLOM 67 04/18/2025 06:01 AM    LABGLOM 61 04/10/2024 12:45 PM    GLUCOSE 109 04/18/2025 06:01 AM    CALCIUM 9.0 04/18/2025 06:01 AM    BILITOT 0.2 04/18/2025 06:01 AM    ALKPHOS 81 04/18/2025 06:01 AM    AST 30 04/18/2025 06:01 AM    ALT 15 04/18/2025 06:01 AM     Radiology:  Reviewed     Microbiology:   Legionella and strep pneumo- negative   4/16/2025- body fluid- no growth to date   4/16/2025- blood cx- no growth     ASSESSMENT:  left wrist pain  Pseudogout of the left wrist   Leukocytosis   Elevated procal   S/p left wrist aspirate- calcium pyrophosphate dihydrate     PLAN:  Discussed with Dr. Hunter   Currently on Rocephin and doxycycline   Stop rocephin   Check cultures- no growth to date   Monitor labs- repeat procal in am   Crp- 46.4  sed rate- 49  Check bartonella   If procal is decreasing and patient is doing well can d/c tomorrow   at

## 2025-04-18 NOTE — PROGRESS NOTES
Physician Progress Note      PATIENT:               JOSE MIGUEL STERN  [facility] #:                  [id number]  :                       1940  ADMIT DATE:       2025 2:41 PM  DISCH DATE:  RESPONDING  PROVIDER #:        Terence Odom DO          QUERY TEXT:    Acute respiratory failure is documented in the medical record H&P and  IM   note. Please provide additional clinical indicators supportive of your   documentation. Or please document if the diagnosis of acute respiratory   failure has been ruled out after study.    The clinical indicators include:  ED: Not in respiratory distress  Flowsheet: SpO2: 96-98% RA  H&P: Symmetric. No increased work of breathing, good air exchange, clear to   auscultation bilaterally, no wheezes, rhonchi, or rales,  Options provided:  -- Acute Respiratory Failure as evidenced by, Please document evidence.  -- Acute Respiratory Failure ruled out after study  -- Other - I will add my own diagnosis  -- Disagree - Not applicable / Not valid  -- Disagree - Clinically unable to determine / Unknown  -- Refer to Clinical Documentation Reviewer    PROVIDER RESPONSE TEXT:    Acute Respiratory Failure has been ruled out after study.    Query created by: Krystal Santos on 2025 8:30 AM      QUERY TEXT:    Please document the relationship, if any, between the cellulitis and diabetes:    The clinical indicators include:  IM note:  Cellulitis/pseudogout of left wrist  Hx: Diabetes mellitus  MAR: Vancomycin, Rocephin,  Options provided:  -- Left wrist cellulitis related to Diabetes  -- Left wrist cellulitis unrelated to Diabetes  -- Other - I will add my own diagnosis  -- Disagree - Not applicable / Not valid  -- Disagree - Clinically unable to determine / Unknown  -- Refer to Clinical Documentation Reviewer    PROVIDER RESPONSE TEXT:    Left wrist cellulitis unrelated to Diabetes.    Query created by: Krystal Santos on 2025 8:30 AM      Electronically signed by:  Terence MASON

## 2025-04-19 VITALS
HEIGHT: 65 IN | OXYGEN SATURATION: 95 % | HEART RATE: 81 BPM | SYSTOLIC BLOOD PRESSURE: 141 MMHG | RESPIRATION RATE: 16 BRPM | DIASTOLIC BLOOD PRESSURE: 86 MMHG | BODY MASS INDEX: 35.37 KG/M2 | WEIGHT: 212.3 LBS | TEMPERATURE: 98.2 F

## 2025-04-19 LAB
ALBUMIN SERPL-MCNC: 2.9 G/DL (ref 3.5–5.2)
ALP SERPL-CCNC: 67 U/L (ref 35–104)
ALT SERPL-CCNC: 11 U/L (ref 0–32)
ANION GAP SERPL CALCULATED.3IONS-SCNC: 9 MMOL/L (ref 7–16)
AST SERPL-CCNC: 20 U/L (ref 0–31)
BASOPHILS # BLD: 0.02 K/UL (ref 0–0.2)
BASOPHILS NFR BLD: 0 % (ref 0–2)
BILIRUB SERPL-MCNC: 0.2 MG/DL (ref 0–1.2)
BUN SERPL-MCNC: 9 MG/DL (ref 6–23)
CALCIUM SERPL-MCNC: 8 MG/DL (ref 8.6–10.2)
CHLORIDE SERPL-SCNC: 115 MMOL/L (ref 98–107)
CO2 SERPL-SCNC: 18 MMOL/L (ref 22–29)
CREAT SERPL-MCNC: 0.7 MG/DL (ref 0.5–1)
EOSINOPHIL # BLD: 0.13 K/UL (ref 0.05–0.5)
EOSINOPHILS RELATIVE PERCENT: 2 % (ref 0–6)
ERYTHROCYTE [DISTWIDTH] IN BLOOD BY AUTOMATED COUNT: 14 % (ref 11.5–15)
GFR, ESTIMATED: 82 ML/MIN/1.73M2
GLUCOSE SERPL-MCNC: 93 MG/DL (ref 74–99)
HCT VFR BLD AUTO: 33.7 % (ref 34–48)
HGB BLD-MCNC: 10.7 G/DL (ref 11.5–15.5)
IMM GRANULOCYTES # BLD AUTO: <0.03 K/UL (ref 0–0.58)
IMM GRANULOCYTES NFR BLD: 0 % (ref 0–5)
LYMPHOCYTES NFR BLD: 1.37 K/UL (ref 1.5–4)
LYMPHOCYTES RELATIVE PERCENT: 25 % (ref 20–42)
MCH RBC QN AUTO: 28.7 PG (ref 26–35)
MCHC RBC AUTO-ENTMCNC: 31.8 G/DL (ref 32–34.5)
MCV RBC AUTO: 90.3 FL (ref 80–99.9)
MONOCYTES NFR BLD: 0.54 K/UL (ref 0.1–0.95)
MONOCYTES NFR BLD: 10 % (ref 2–12)
NEUTROPHILS NFR BLD: 62 % (ref 43–80)
NEUTS SEG NFR BLD: 3.35 K/UL (ref 1.8–7.3)
PLATELET # BLD AUTO: 202 K/UL (ref 130–450)
PMV BLD AUTO: 9.9 FL (ref 7–12)
POTASSIUM SERPL-SCNC: 3.4 MMOL/L (ref 3.5–5)
PROCALCITONIN SERPL-MCNC: 6.31 NG/ML (ref 0–0.08)
PROT SERPL-MCNC: 5.2 G/DL (ref 6.4–8.3)
RBC # BLD AUTO: 3.73 M/UL (ref 3.5–5.5)
SODIUM SERPL-SCNC: 142 MMOL/L (ref 132–146)
WBC OTHER # BLD: 5.4 K/UL (ref 4.5–11.5)

## 2025-04-19 PROCEDURE — 80053 COMPREHEN METABOLIC PANEL: CPT

## 2025-04-19 PROCEDURE — 6370000000 HC RX 637 (ALT 250 FOR IP): Performed by: INTERNAL MEDICINE

## 2025-04-19 PROCEDURE — 36415 COLL VENOUS BLD VENIPUNCTURE: CPT

## 2025-04-19 PROCEDURE — 6370000000 HC RX 637 (ALT 250 FOR IP)

## 2025-04-19 PROCEDURE — 85025 COMPLETE CBC W/AUTO DIFF WBC: CPT

## 2025-04-19 PROCEDURE — 6360000002 HC RX W HCPCS: Performed by: INTERNAL MEDICINE

## 2025-04-19 PROCEDURE — 84145 PROCALCITONIN (PCT): CPT

## 2025-04-19 RX ORDER — OXYCODONE AND ACETAMINOPHEN 5; 325 MG/1; MG/1
1 TABLET ORAL EVERY 8 HOURS PRN
Qty: 15 TABLET | Refills: 0 | Status: SHIPPED | OUTPATIENT
Start: 2025-04-19 | End: 2025-04-24

## 2025-04-19 RX ADMIN — DOXYCYCLINE HYCLATE 100 MG: 100 CAPSULE ORAL at 08:59

## 2025-04-19 RX ADMIN — ENOXAPARIN SODIUM 40 MG: 100 INJECTION SUBCUTANEOUS at 08:59

## 2025-04-19 RX ADMIN — GABAPENTIN 300 MG: 300 CAPSULE ORAL at 05:54

## 2025-04-19 RX ADMIN — POTASSIUM CHLORIDE 40 MEQ: 1500 TABLET, EXTENDED RELEASE ORAL at 08:59

## 2025-04-19 NOTE — PROGRESS NOTES
[facility]  [facility]  Progress Note  CC: left wrist pain  Face to face encounter   SUBJECTIVE:  4/19/2025  In bed asleep  present nad pain better no f/c/n/v/d on RA  4/18   Patient is in bed- left wrist is feeling better  Has been afebrile. On room air. Worked with PT this morning.    at bedside.   Patient is tolerating medications. No reported adverse drug reactions.  No nausea, vomiting, diarrhea.    Medications:  Scheduled Meds:   doxycycline hyclate  100 mg Oral 2 times per day    sodium chloride flush  5-40 mL IntraVENous 2 times per day    enoxaparin  40 mg SubCUTAneous Daily    gabapentin  300 mg Oral Q8H    latanoprost  1 drop Both Eyes Nightly     Continuous Infusions:   sodium chloride 100 mL/hr at 04/17/25 2012    dextrose      sodium chloride       PRN Meds:prochlorperazine, oxyCODONE-acetaminophen, glucose, dextrose bolus **OR** dextrose bolus, glucagon (rDNA), dextrose, sodium chloride flush, sodium chloride, potassium chloride **OR** potassium alternative oral replacement **OR** potassium chloride, magnesium sulfate, polyethylene glycol, acetaminophen **OR** acetaminophen  OBJECTIVE:  Patient Vitals for the past 24 hrs:   BP Temp Temp src Pulse Resp SpO2   04/19/25 0857 (!) 141/86 98.2 °F (36.8 °C) Oral 81 16 95 %   04/18/25 1925 (!) 156/75 -- -- 85 -- 97 %     Constitutional: The patient is awake, alert,    Skin:  . No rashes were noted.   Head: at/nc   Chest:  Symmetrical expansion. Auscultation reveals no wheezing, crackles, or rhonchi.   Cardiovascular: S1 and S2 are rhythmic and regular.  Abdomen: Positive bowel sounds to auscultation. Benign to palpation.   Extremities:  , no edema.    no erythema- better range of motion  PIV    Laboratory and Tests Review:  Lab Results   Component Value Date    WBC 5.4 04/19/2025    WBC 8.9 04/18/2025    WBC 12.7 (H) 04/17/2025    HGB 10.7 (L) 04/19/2025    HCT 33.7 (L) 04/19/2025    MCV 90.3 04/19/2025

## 2025-04-19 NOTE — DISCHARGE SUMMARY
Internal Medicine Progress Note     [facility]     Terence Odom D.O., RUDI Sebastian D.O., AINSLEY Arvizu, MSN, APRN, NP-C  Vladislav Crook, MSN, APRN-CNP  Florin Good, MSN, APRN, NP-C  Nevin Siegel, MSN, APRN-CNP  Magdalene Arcos, MSN, APRN, NP-C       Internal Medicine  Discharge Summary    NAME: Deborah Iglesias  :  1940  MRN:  [id number]  PCP:Nevin Siegel APRN - [facility]  ADMITTED: 2025      DISCHARGED: 25    ADMITTING PHYSICIAN: Terence Odom DO    CONSULTANT(S):   IP CONSULT TO ORTHOPEDIC SURGERY  IP CONSULT TO ORTHOPEDIC SURGERY  IP CONSULT TO INTERNAL MEDICINE  IP CONSULT TO INFECTIOUS DISEASES     ADMITTING DIAGNOSIS:   Left wrist pain [M25.532]  Osteoarthritis of left wrist, unspecified osteoarthritis type [M19.032]  Cellulitis of left wrist [L03.114]     DISCHARGE DIAGNOSES:   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 Has been worsening since time. She admits to associated edema. She admits to associated fever/chills. She denies any shortness of breath or chest pain. She denies any injury to her left wrist. She denies any previous history of joint infection.     LABS::  Lab Results   Component Value Date    WBC 5.4 2025    HGB 10.7 (L) 2025    HCT 33.7 (L) 2025     2025     2025    K 3.4 (L) 2025     (H) 2025    CREATININE 0.7 2025    BUN 9 2025    CO2 18 (L) 2025    GLUCOSE 93 2025    ALT 11 2025    AST 20 2025

## 2025-04-20 LAB
MICROORGANISM SPEC CULT: NORMAL
MICROORGANISM/AGENT SPEC: NORMAL
SERVICE CMNT-IMP: NORMAL
SPECIMEN DESCRIPTION: NORMAL

## 2025-04-21 ENCOUNTER — CARE COORDINATION (OUTPATIENT)
Dept: CARE COORDINATION | Age: 85
End: 2025-04-21

## 2025-04-21 LAB
MICROORGANISM SPEC CULT: NORMAL
MICROORGANISM SPEC CULT: NORMAL
SERVICE CMNT-IMP: NORMAL
SERVICE CMNT-IMP: NORMAL
SPECIMEN DESCRIPTION: NORMAL
SPECIMEN DESCRIPTION: NORMAL

## 2025-04-22 LAB
MICROORGANISM SPEC CULT: NO GROWTH
MICROORGANISM/AGENT SPEC: NORMAL
SERVICE CMNT-IMP: NORMAL
SPECIMEN DESCRIPTION: NORMAL

## 2025-04-24 LAB
BARTONELLA HENSELAE AB, IGG: NORMAL
BARTONELLA HENSELAE AB, IGM: NORMAL

## 2025-05-12 ENCOUNTER — HOSPITAL ENCOUNTER (EMERGENCY)
Age: 85
Discharge: HOME | End: 2025-05-12
Payer: MEDICARE

## 2025-05-12 VITALS
BODY MASS INDEX: 35.28 KG/M2 | RESPIRATION RATE: 17 BRPM | HEART RATE: 85 BPM | SYSTOLIC BLOOD PRESSURE: 132 MMHG | TEMPERATURE: 98 F | DIASTOLIC BLOOD PRESSURE: 82 MMHG | OXYGEN SATURATION: 97 % | WEIGHT: 212 LBS

## 2025-05-12 DIAGNOSIS — N30.00 ACUTE CYSTITIS WITHOUT HEMATURIA: Primary | ICD-10-CM

## 2025-05-12 LAB
BACTERIA URNS QL MICRO: ABNORMAL
BILIRUB UR QL STRIP: NEGATIVE
CLARITY UR: ABNORMAL
COLOR UR: ABNORMAL
GLUCOSE UR STRIP-MCNC: 100 MG/DL
HGB UR QL STRIP.AUTO: ABNORMAL
KETONES UR STRIP-MCNC: NEGATIVE MG/DL
LEUKOCYTE ESTERASE UR QL STRIP: ABNORMAL
NITRITE UR QL STRIP: POSITIVE
PH UR STRIP: 5.5 [PH] (ref 5–8)
PROT UR STRIP-MCNC: 100 MG/DL
RBC #/AREA URNS HPF: ABNORMAL /HPF
SP GR UR STRIP: 1.02 (ref 1–1.03)
UROBILINOGEN UR STRIP-ACNC: 1 EU/DL (ref 0–1)
WBC #/AREA URNS HPF: ABNORMAL /HPF

## 2025-05-12 PROCEDURE — 99211 OFF/OP EST MAY X REQ PHY/QHP: CPT

## 2025-05-12 PROCEDURE — 81001 URINALYSIS AUTO W/SCOPE: CPT

## 2025-05-12 RX ORDER — SULFAMETHOXAZOLE AND TRIMETHOPRIM 800; 160 MG/1; MG/1
1 TABLET ORAL 2 TIMES DAILY
Qty: 14 TABLET | Refills: 0 | Status: SHIPPED | OUTPATIENT
Start: 2025-05-12 | End: 2025-05-14

## 2025-05-12 RX ORDER — CEFDINIR 300 MG/1
300 CAPSULE ORAL 2 TIMES DAILY
Qty: 14 CAPSULE | Refills: 0 | Status: SHIPPED | OUTPATIENT
Start: 2025-05-12 | End: 2025-05-12 | Stop reason: ALTCHOICE

## 2025-05-12 ASSESSMENT — PAIN - FUNCTIONAL ASSESSMENT: PAIN_FUNCTIONAL_ASSESSMENT: 0-10

## 2025-05-12 ASSESSMENT — PAIN DESCRIPTION - PAIN TYPE: TYPE: ACUTE PAIN

## 2025-05-12 ASSESSMENT — PAIN DESCRIPTION - DESCRIPTORS: DESCRIPTORS: DISCOMFORT;DULL;BURNING

## 2025-05-12 ASSESSMENT — PAIN DESCRIPTION - LOCATION: LOCATION: PERINEUM

## 2025-05-12 ASSESSMENT — PAIN SCALES - GENERAL: PAINLEVEL_OUTOF10: 0

## 2025-05-12 NOTE — ED PROVIDER NOTES
re-evaluation is most likely lower than the risk of death attributable to being in the hospital.     Plan of Care/Counseling:  Fabiano Cary NP reviewed today's visit with the patient in addition to providing specific details for the plan of care and counseling regarding the diagnosis and prognosis.  Questions are answered at this time and are agreeable with the plan.    ASSESSMENT     1. Acute cystitis without hematuria        DISPOSITION   Discharged home.  Patient condition is stable.    Discharge Instructions:   Patient referred to  Nevin Siegel APRN - CNP  [address] [place] [id number]  [phone]    Call in 1 day  As needed, If symptoms worsen, For reevaluation    NEW MEDICATIONS     Discharge Medication List as of 5/12/2025  1:05 PM        START taking these medications    Details   cefdinir (OMNICEF) 300 MG capsule Take 1 capsule by mouth 2 times daily for 7 days, Disp-14 capsule, R-0Normal           Electronically signed by REFUGIO Burleson NP   DD: 5/12/25  **This report was transcribed using voice recognition software. Every effort was made to ensure accuracy; however, inadvertent computerized transcription errors may be present.  END OF ED PROVIDER NOTE      Fabiano Biggs APRN - NP  05/12/25 1307       Fabiano Biggs APRN - NP  05/12/25 [id number]

## 2025-05-29 ENCOUNTER — OFFICE VISIT (OUTPATIENT)
Dept: PODIATRY | Age: 85
End: 2025-05-29
Payer: MEDICARE

## 2025-05-29 VITALS — HEIGHT: 65 IN | WEIGHT: 206 LBS | BODY MASS INDEX: 34.32 KG/M2

## 2025-05-29 DIAGNOSIS — B35.3 TINEA PEDIS OF LEFT FOOT: Primary | ICD-10-CM

## 2025-05-29 DIAGNOSIS — I87.2 VENOUS INSUFFICIENCY (CHRONIC) (PERIPHERAL): ICD-10-CM

## 2025-05-29 DIAGNOSIS — B35.1 ONYCHOMYCOSIS: ICD-10-CM

## 2025-05-29 PROCEDURE — 1159F MED LIST DOCD IN RCRD: CPT | Performed by: PODIATRIST

## 2025-05-29 PROCEDURE — 1123F ACP DISCUSS/DSCN MKR DOCD: CPT | Performed by: PODIATRIST

## 2025-05-29 PROCEDURE — 99204 OFFICE O/P NEW MOD 45 MIN: CPT | Performed by: PODIATRIST

## 2025-05-29 RX ORDER — TERBINAFINE HYDROCHLORIDE 250 MG/1
250 TABLET ORAL DAILY
Qty: 21 TABLET | Refills: 0 | Status: SHIPPED | OUTPATIENT
Start: 2025-05-29 | End: 2025-06-19

## 2025-05-29 RX ORDER — CLOBETASOL PROPIONATE 0.5 MG/G
OINTMENT TOPICAL
Qty: 60 G | Refills: 5 | Status: SHIPPED | OUTPATIENT
Start: 2025-05-29

## 2025-05-29 NOTE — PROGRESS NOTES
Patient is in today for evaluation of left foot. Patient says she has a rash on the foot and toenail. Pcp is Nevin Siegel APRN - CNP  Last ov 5/14/25  
Relation Age of Onset    Coronary Art Dis Mother     Heart Attack Mother     High Blood Pressure Mother     Osteoarthritis Mother     Osteoporosis Mother     Coronary Art Dis Father     Heart Attack Father     Arthritis Paternal Grandfather     Coronary Art Dis Paternal Grandfather     Diabetes Maternal Aunt     Diabetes Maternal Cousin     Diabetes Maternal Cousin      Past Surgical History:   Procedure Laterality Date    BREAST BIOPSY Bilateral 2015 right , 2020 left    BREAST LUMPECTOMY      CATARACT REMOVAL WITH IMPLANT Right 2005    CHOLECYSTECTOMY  2015    JOINT REPLACEMENT      KNEE ARTHROPLASTY  2012, 2020    OTHER SURGICAL HISTORY      arthroscopic thumb bilateral    REVISION TOTAL KNEE ARTHROPLASTY Right 07/08/2020    RIGHT KNEE TOTAL ARTHROPLASTY REVISION  ++LUCY++   ++PNB++ performed by Florin [facility] MD [facility] OR    TONSILLECTOMY      TOTAL KNEE ARTHROPLASTY Bilateral 2013    US BREAST BIOPSY W LOC DEVICE 1ST LESION LEFT Left 08/22/2022    US BREAST NEEDLE BIOPSY LEFT 8/22/2022 [facility]     Social History     Tobacco Use    Smoking status: Never    Smokeless tobacco: Never   Vaping Use    Vaping status: Never Used   Substance Use Topics    Alcohol use: Yes     Alcohol/week: 5.0 standard drinks of alcohol     Types: 5 Glasses of wine per week    Drug use: Never           Diagnostic studies:    No results found.      Procedures:    None    Exam:  VASCULAR: Pedal pulses palpable left foot.  Capillary refill time brisk to digits 1 through 5 left foot  NEUROLOGICAL: Epicritic sensations intact left lower extremity  DERMATOLOGICAL: Chronic xerotic skin changes noted moccasin type distribution left foot.  Erythema without blistering noted left foot.  No maceration of webspaces noted left foot.  Venous insufficiency issues noted to both lower extremities with varicosities and mild skin stasis changes present bilaterally.  MUSCULOSKELETAL: Adequate range of motion noted digital regions left

## 2025-07-10 ENCOUNTER — OFFICE VISIT (OUTPATIENT)
Dept: PODIATRY | Age: 85
End: 2025-07-10
Payer: MEDICARE

## 2025-07-10 VITALS
SYSTOLIC BLOOD PRESSURE: 169 MMHG | BODY MASS INDEX: 34.28 KG/M2 | WEIGHT: 206 LBS | TEMPERATURE: 97.7 F | DIASTOLIC BLOOD PRESSURE: 88 MMHG | HEART RATE: 77 BPM

## 2025-07-10 DIAGNOSIS — B35.1 ONYCHOMYCOSIS: ICD-10-CM

## 2025-07-10 DIAGNOSIS — B35.3 TINEA PEDIS OF LEFT FOOT: Primary | ICD-10-CM

## 2025-07-10 DIAGNOSIS — I87.2 VENOUS INSUFFICIENCY (CHRONIC) (PERIPHERAL): ICD-10-CM

## 2025-07-10 PROCEDURE — 99213 OFFICE O/P EST LOW 20 MIN: CPT | Performed by: PODIATRIST

## 2025-07-10 PROCEDURE — 1123F ACP DISCUSS/DSCN MKR DOCD: CPT | Performed by: PODIATRIST

## 2025-07-10 PROCEDURE — 3079F DIAST BP 80-89 MM HG: CPT | Performed by: PODIATRIST

## 2025-07-10 PROCEDURE — 3077F SYST BP >= 140 MM HG: CPT | Performed by: PODIATRIST

## 2025-07-10 PROCEDURE — 1159F MED LIST DOCD IN RCRD: CPT | Performed by: PODIATRIST

## 2025-07-11 NOTE — PROGRESS NOTES
7/10/25     Deborah Iglesias    : 1940   Sex: female    Age: 84 y.o.    Patient's PCP/Provider is:  Nevin Siegel APRN - CNP    Subjective:  Patient is seen today for follow-up regarding continued care regarding chronic nail dystrophy issues, tinea pedis issues left foot.  Patient has been utilizing the topical medications as instructed with improvement noted.  Patient denies any additional abnormalities at this time.    Chief Complaint   Patient presents with    Follow-up    Nail Problem       ROS:  Const: Positives and pertinent negatives as per HPI.    Musculo: Denies symptoms other than stated above.  Neuro: Denies symptoms other than stated above.  Skin: Denies symptoms other than stated above.    Current Medications:    Current Outpatient Medications:     PARoxetine (PAXIL) 10 MG tablet, TAKE 1 TABLET BY MOUTH EVERY MORNING, Disp: 90 tablet, Rfl: 1    losartan (COZAAR) 50 MG tablet, TAKE 1 TABLET BY MOUTH DAILY, Disp: 90 tablet, Rfl: 1    Cyanocobalamin (VITAMIN B-12) 1000 MCG SUBL, Place 1,000 mcg under the tongue daily, Disp: 90 tablet, Rfl: 1    lovastatin (MEVACOR) 40 MG tablet, Take 1 tablet by mouth nightly, Disp: 90 tablet, Rfl: 0    clobetasol (TEMOVATE) 0.05 % ointment, Apply topically 2 times daily., Disp: 60 g, Rfl: 5    ketoconazole (NIZORAL) 2 % cream, Apply topically daily., Disp: 30 g, Rfl: 1    celecoxib (CELEBREX) 200 MG capsule, Take 1 capsule by mouth 2 times daily, Disp: 180 capsule, Rfl: 1    folic acid (FOLVITE) 1 MG tablet, TAKE 1 TABLET BY MOUTH DAILY, Disp: 90 tablet, Rfl: 1    diclofenac sodium (VOLTAREN) 1 % GEL, Apply 4 g topically 4 times daily for 5 days, Disp: 50 g, Rfl: 0    omeprazole (PRILOSEC) 20 MG delayed release capsule, TAKE 1 CAPSULE BY MOUTH EVERY MORNING BEFORE BREAKFAST, Disp: 90 capsule, Rfl: 1    lidocaine (LIDODERM) 5 %, Place 1 patch onto the skin daily 12 hours on, 12 hours off., Disp: 30 patch, Rfl: 3    gabapentin (NEURONTIN) 300 MG capsule, Take 1

## 2025-09-02 ENCOUNTER — OFFICE VISIT (OUTPATIENT)
Dept: PODIATRY | Age: 85
End: 2025-09-02
Payer: MEDICARE

## 2025-09-02 VITALS — HEART RATE: 100 BPM | WEIGHT: 203 LBS | TEMPERATURE: 96.8 F | BODY MASS INDEX: 33.78 KG/M2 | OXYGEN SATURATION: 96 %

## 2025-09-02 DIAGNOSIS — I87.2 VENOUS INSUFFICIENCY (CHRONIC) (PERIPHERAL): ICD-10-CM

## 2025-09-02 DIAGNOSIS — B35.1 ONYCHOMYCOSIS: ICD-10-CM

## 2025-09-02 DIAGNOSIS — B35.3 TINEA PEDIS OF LEFT FOOT: Primary | ICD-10-CM

## 2025-09-02 PROCEDURE — 1123F ACP DISCUSS/DSCN MKR DOCD: CPT | Performed by: PODIATRIST

## 2025-09-02 PROCEDURE — 1159F MED LIST DOCD IN RCRD: CPT | Performed by: PODIATRIST

## 2025-09-02 PROCEDURE — 99213 OFFICE O/P EST LOW 20 MIN: CPT | Performed by: PODIATRIST

## (undated) DEVICE — ELECTRODE PT RET AD L9FT HI MOIST COND ADH HYDRGEL CORDED

## (undated) DEVICE — GOWN,SIRUS,FABRNF,L,20/CS: Brand: MEDLINE

## (undated) DEVICE — COVER HNDL LT DISP

## (undated) DEVICE — STRYKER PERFORMANCE SERIES SAGITTAL BLADE: Brand: STRYKER PERFORMANCE SERIES

## (undated) DEVICE — SYRINGE MED 10ML TRNSLUC BRL PLUNG BLK MRK POLYPR CTRL

## (undated) DEVICE — BANDAGE COMPR W6INXL12FT SMOOTH FOR LIMB EXSANG ESMARCH

## (undated) DEVICE — TUBING, SUCTION, 9/32" X 10', STRAIGHT: Brand: MEDLINE

## (undated) DEVICE — BASIC SINGLE BASIN 1-LF: Brand: MEDLINE INDUSTRIES, INC.

## (undated) DEVICE — ZIMMER® STERILE DISPOSABLE TOURNIQUET CUFF WITH PLC, DUAL PORT, SINGLE BLADDER, 30 IN. (76 CM)

## (undated) DEVICE — 1000 S-DRAPE TOWEL DRAPE 10/BX: Brand: STERI-DRAPE™

## (undated) DEVICE — SIGMOIDOSCOPIC SUCTION INSTRUMENT 18 FR W/WINGED CAP CONTROL AND 6 FOOT (1.8M) TUBING: Brand: SIGMOIDOSCOPIC

## (undated) DEVICE — TUBE IRRIG HNDPC HI FLO TP INTRPULS W/SUCTION TUBE

## (undated) DEVICE — STRIP,CLOSURE,WOUND,MEDI-STRIP,1/2X4: Brand: MEDLINE

## (undated) DEVICE — 4-PORT MANIFOLD: Brand: NEPTUNE 2

## (undated) DEVICE — DRAPE,REIN 53X77,STERILE: Brand: MEDLINE

## (undated) DEVICE — BOWL AND CEMENT CARTRIDGE WITH BREAKAWAY FEMORAL NOZZLE: Brand: ACM

## (undated) DEVICE — APPLICATOR MEDICATED 26 CC SOLUTION HI LT ORNG CHLORAPREP

## (undated) DEVICE — 2108 SERIES SAGITTAL BLADE FAN, OFFSET  (34.5 X 0.8 X 64.0MM)

## (undated) DEVICE — Z INACTIVE USE 2660664 SOLUTION IRRIG 3000ML 0.9% SOD CHL USP UROMATIC PLAS CONT

## (undated) DEVICE — TOTAL TRAY, DB, 100% SILI FOLEY, 16FR 10: Brand: MEDLINE

## (undated) DEVICE — NEEDLE HYPO 22GA L1.5IN BLK POLYPR HUB S STL REG BVL STR

## (undated) DEVICE — TAPE ADH W3INXL10YD WHT COT WVN BK POWERFUL RUB BASE HIGHLY

## (undated) DEVICE — BLADE SAW SAG 6 SYS 135X1.19X90MM

## (undated) DEVICE — INTENDED FOR TISSUE SEPARATION, AND OTHER PROCEDURES THAT REQUIRE A SHARP SURGICAL BLADE TO PUNCTURE OR CUT.: Brand: BARD-PARKER ® STAINLESS STEEL BLADES

## (undated) DEVICE — DOUBLE BASIN SET: Brand: MEDLINE INDUSTRIES, INC.

## (undated) DEVICE — TOWEL,OR,DSP,ST,BLUE,STD,6/PK,12PK/CS: Brand: MEDLINE

## (undated) DEVICE — TOTAL KNEE PK

## (undated) DEVICE — 3M™ IOBAN™ 2 ANTIMICROBIAL INCISE DRAPE 6650EZ: Brand: IOBAN™ 2

## (undated) DEVICE — DRAPE,TOP,102X53,STERILE: Brand: MEDLINE

## (undated) DEVICE — SUTURE STRATAFIX 1 CT-1 30CM SPIRAL

## (undated) DEVICE — PADDING CAST W6INXL4YD COT LO LINTING WYTEX

## (undated) DEVICE — Z DISCONTINUED PER MEDLINE USE 2741943 DRESSING AQUACEL 10 IN ALG W9XL25CM SIL CVR WTRPRF VIR BACT BARR ANTIMIC

## (undated) DEVICE — PACK PROCEDURE SURG GEN CUST

## (undated) DEVICE — SPONGE LAP W18XL18IN WHT COT 4 PLY FLD STRUNG RADPQ DISP ST